# Patient Record
Sex: FEMALE | Race: WHITE | NOT HISPANIC OR LATINO | Employment: OTHER | URBAN - METROPOLITAN AREA
[De-identification: names, ages, dates, MRNs, and addresses within clinical notes are randomized per-mention and may not be internally consistent; named-entity substitution may affect disease eponyms.]

---

## 2017-02-01 ENCOUNTER — OFFICE VISIT (OUTPATIENT)
Dept: URGENT CARE | Age: 72
End: 2017-02-01
Payer: COMMERCIAL

## 2017-02-01 ENCOUNTER — HOSPITAL ENCOUNTER (OUTPATIENT)
Dept: RADIOLOGY | Age: 72
Discharge: HOME | End: 2017-02-01
Attending: PHYSICIAN ASSISTANT | Admitting: FAMILY MEDICINE
Payer: COMMERCIAL

## 2017-02-01 ENCOUNTER — TRANSCRIBE ORDERS (OUTPATIENT)
Dept: URGENT CARE | Age: 72
End: 2017-02-01

## 2017-02-01 DIAGNOSIS — S69.90XA UNSPECIFIED INJURY OF UNSPECIFIED WRIST, HAND AND FINGER(S), INITIAL ENCOUNTER: ICD-10-CM

## 2017-02-01 PROCEDURE — 99213 OFFICE O/P EST LOW 20 MIN: CPT | Performed by: FAMILY MEDICINE

## 2017-02-01 PROCEDURE — 73110 X-RAY EXAM OF WRIST: CPT

## 2017-02-01 PROCEDURE — 73130 X-RAY EXAM OF HAND: CPT

## 2017-04-17 ENCOUNTER — GENERIC CONVERSION - ENCOUNTER (OUTPATIENT)
Dept: OTHER | Facility: OTHER | Age: 72
End: 2017-04-17

## 2017-07-12 ENCOUNTER — ALLSCRIPTS OFFICE VISIT (OUTPATIENT)
Dept: OTHER | Facility: OTHER | Age: 72
End: 2017-07-12

## 2017-07-12 DIAGNOSIS — R63.4 ABNORMAL WEIGHT LOSS: ICD-10-CM

## 2017-07-12 DIAGNOSIS — R05.9 COUGH: ICD-10-CM

## 2017-07-12 DIAGNOSIS — R23.8 OTHER SKIN CHANGES: ICD-10-CM

## 2017-07-12 DIAGNOSIS — I10 ESSENTIAL (PRIMARY) HYPERTENSION: ICD-10-CM

## 2017-07-12 DIAGNOSIS — R00.2 PALPITATIONS: ICD-10-CM

## 2017-07-12 DIAGNOSIS — Z12.12 ENCOUNTER FOR SCREENING FOR MALIGNANT NEOPLASM OF RECTUM: ICD-10-CM

## 2017-07-12 DIAGNOSIS — Z87.891 PERSONAL HISTORY OF NICOTINE DEPENDENCE: ICD-10-CM

## 2017-07-12 DIAGNOSIS — R53.83 OTHER FATIGUE: ICD-10-CM

## 2017-07-12 DIAGNOSIS — Z12.11 ENCOUNTER FOR SCREENING FOR MALIGNANT NEOPLASM OF COLON: ICD-10-CM

## 2017-10-16 ENCOUNTER — APPOINTMENT (OUTPATIENT)
Dept: LAB | Facility: HOSPITAL | Age: 72
End: 2017-10-16
Payer: COMMERCIAL

## 2017-10-16 DIAGNOSIS — Z12.12 ENCOUNTER FOR SCREENING FOR MALIGNANT NEOPLASM OF RECTUM: ICD-10-CM

## 2017-10-16 DIAGNOSIS — Z12.11 ENCOUNTER FOR SCREENING FOR MALIGNANT NEOPLASM OF COLON: ICD-10-CM

## 2017-10-16 LAB — HEMOCCULT STL QL IA: NEGATIVE

## 2017-10-16 PROCEDURE — G0328 FECAL BLOOD SCRN IMMUNOASSAY: HCPCS

## 2017-10-19 ENCOUNTER — GENERIC CONVERSION - ENCOUNTER (OUTPATIENT)
Dept: OTHER | Facility: OTHER | Age: 72
End: 2017-10-19

## 2017-10-31 ENCOUNTER — TRANSCRIBE ORDERS (OUTPATIENT)
Dept: LAB | Facility: CLINIC | Age: 72
End: 2017-10-31

## 2017-10-31 ENCOUNTER — APPOINTMENT (OUTPATIENT)
Dept: LAB | Facility: CLINIC | Age: 72
End: 2017-10-31
Payer: COMMERCIAL

## 2017-10-31 DIAGNOSIS — Z12.12 ENCOUNTER FOR SCREENING FOR MALIGNANT NEOPLASM OF RECTUM: ICD-10-CM

## 2017-10-31 DIAGNOSIS — R23.8 OTHER SKIN CHANGES: ICD-10-CM

## 2017-10-31 DIAGNOSIS — R00.2 PALPITATIONS: ICD-10-CM

## 2017-10-31 DIAGNOSIS — R05.9 COUGH: ICD-10-CM

## 2017-10-31 DIAGNOSIS — Z12.11 ENCOUNTER FOR SCREENING FOR MALIGNANT NEOPLASM OF COLON: ICD-10-CM

## 2017-10-31 DIAGNOSIS — I10 ESSENTIAL (PRIMARY) HYPERTENSION: ICD-10-CM

## 2017-10-31 DIAGNOSIS — R63.4 ABNORMAL WEIGHT LOSS: ICD-10-CM

## 2017-10-31 DIAGNOSIS — R53.83 OTHER FATIGUE: ICD-10-CM

## 2017-10-31 LAB
25(OH)D3 SERPL-MCNC: 28.3 NG/ML (ref 30–100)
ALBUMIN SERPL BCP-MCNC: 3.5 G/DL (ref 3.5–5)
ALP SERPL-CCNC: 90 U/L (ref 46–116)
ALT SERPL W P-5'-P-CCNC: 22 U/L (ref 12–78)
ANION GAP SERPL CALCULATED.3IONS-SCNC: 7 MMOL/L (ref 4–13)
AST SERPL W P-5'-P-CCNC: 15 U/L (ref 5–45)
BASOPHILS # BLD AUTO: 0.05 THOUSANDS/ΜL (ref 0–0.1)
BASOPHILS NFR BLD AUTO: 1 % (ref 0–1)
BILIRUB SERPL-MCNC: 0.48 MG/DL (ref 0.2–1)
BILIRUB UR QL STRIP: NEGATIVE
BUN SERPL-MCNC: 25 MG/DL (ref 5–25)
CALCIUM SERPL-MCNC: 8.9 MG/DL (ref 8.3–10.1)
CHLORIDE SERPL-SCNC: 104 MMOL/L (ref 100–108)
CHOLEST SERPL-MCNC: 213 MG/DL (ref 50–200)
CLARITY UR: CLEAR
CO2 SERPL-SCNC: 26 MMOL/L (ref 21–32)
COLOR UR: YELLOW
CREAT SERPL-MCNC: 1.11 MG/DL (ref 0.6–1.3)
EOSINOPHIL # BLD AUTO: 0.18 THOUSAND/ΜL (ref 0–0.61)
EOSINOPHIL NFR BLD AUTO: 4 % (ref 0–6)
ERYTHROCYTE [DISTWIDTH] IN BLOOD BY AUTOMATED COUNT: 12.1 % (ref 11.6–15.1)
GFR SERPL CREATININE-BSD FRML MDRD: 50 ML/MIN/1.73SQ M
GLUCOSE P FAST SERPL-MCNC: 99 MG/DL (ref 65–99)
GLUCOSE UR STRIP-MCNC: NEGATIVE MG/DL
HCT VFR BLD AUTO: 36.3 % (ref 34.8–46.1)
HDLC SERPL-MCNC: 82 MG/DL (ref 40–60)
HGB BLD-MCNC: 12.4 G/DL (ref 11.5–15.4)
HGB UR QL STRIP.AUTO: NEGATIVE
INR PPP: 0.96 (ref 0.86–1.16)
KETONES UR STRIP-MCNC: NEGATIVE MG/DL
LDLC SERPL CALC-MCNC: 114 MG/DL (ref 0–100)
LEUKOCYTE ESTERASE UR QL STRIP: NEGATIVE
LYMPHOCYTES # BLD AUTO: 1.79 THOUSANDS/ΜL (ref 0.6–4.47)
LYMPHOCYTES NFR BLD AUTO: 35 % (ref 14–44)
MCH RBC QN AUTO: 32.4 PG (ref 26.8–34.3)
MCHC RBC AUTO-ENTMCNC: 34.2 G/DL (ref 31.4–37.4)
MCV RBC AUTO: 95 FL (ref 82–98)
MONOCYTES # BLD AUTO: 0.42 THOUSAND/ΜL (ref 0.17–1.22)
MONOCYTES NFR BLD AUTO: 8 % (ref 4–12)
NEUTROPHILS # BLD AUTO: 2.7 THOUSANDS/ΜL (ref 1.85–7.62)
NEUTS SEG NFR BLD AUTO: 52 % (ref 43–75)
NITRITE UR QL STRIP: NEGATIVE
NRBC BLD AUTO-RTO: 0 /100 WBCS
PH UR STRIP.AUTO: 6 [PH] (ref 4.5–8)
PLATELET # BLD AUTO: 326 THOUSANDS/UL (ref 149–390)
PMV BLD AUTO: 10.6 FL (ref 8.9–12.7)
POTASSIUM SERPL-SCNC: 4.4 MMOL/L (ref 3.5–5.3)
PROT SERPL-MCNC: 6.8 G/DL (ref 6.4–8.2)
PROT UR STRIP-MCNC: NEGATIVE MG/DL
PROTHROMBIN TIME: 12.8 SECONDS (ref 12.1–14.4)
RBC # BLD AUTO: 3.83 MILLION/UL (ref 3.81–5.12)
SODIUM SERPL-SCNC: 137 MMOL/L (ref 136–145)
SP GR UR STRIP.AUTO: 1.01 (ref 1–1.03)
TRIGL SERPL-MCNC: 85 MG/DL
TSH SERPL DL<=0.05 MIU/L-ACNC: 2.13 UIU/ML (ref 0.36–3.74)
UROBILINOGEN UR QL STRIP.AUTO: 0.2 E.U./DL
WBC # BLD AUTO: 5.16 THOUSAND/UL (ref 4.31–10.16)

## 2017-10-31 PROCEDURE — 81003 URINALYSIS AUTO W/O SCOPE: CPT

## 2017-10-31 PROCEDURE — 80053 COMPREHEN METABOLIC PANEL: CPT

## 2017-10-31 PROCEDURE — 36415 COLL VENOUS BLD VENIPUNCTURE: CPT

## 2017-10-31 PROCEDURE — 84443 ASSAY THYROID STIM HORMONE: CPT

## 2017-10-31 PROCEDURE — 82306 VITAMIN D 25 HYDROXY: CPT

## 2017-10-31 PROCEDURE — 85025 COMPLETE CBC W/AUTO DIFF WBC: CPT

## 2017-10-31 PROCEDURE — 85610 PROTHROMBIN TIME: CPT

## 2017-10-31 PROCEDURE — 80061 LIPID PANEL: CPT

## 2018-01-05 ENCOUNTER — OFFICE VISIT (OUTPATIENT)
Dept: URGENT CARE | Age: 73
End: 2018-01-05
Payer: COMMERCIAL

## 2018-01-05 PROCEDURE — 99213 OFFICE O/P EST LOW 20 MIN: CPT | Performed by: FAMILY MEDICINE

## 2018-01-07 NOTE — PROGRESS NOTES
Assessment   1  Acute sinusitis (461 9) (J01 90)    Plan   Acute sinusitis    · Amoxicillin-Pot Clavulanate 875-125 MG Oral Tablet (Augmentin); TAKE 1 TABLET    EVERY 12 HOURS DAILY    Discussion/Summary   Discussion Summary:    As we discussed this may possible be a sinus infection  We can try treatment  Start antibiotic  Take probitoic  You declined EKG today for dizziness you had  As we discussed, there may be another cause for this pain  You declined going to ER for full evaluation  If pain persist, becomes worse, you develop chest pain or develop any worsening symptomsI would recommend go directly to ER  Try warm compresses  Continue Advil and Flonase  Medication Side Effects Reviewed: Possible side effects of new medications were reviewed with the patient/guardian today  Understands and agrees with treatment plan: The treatment plan was reviewed with the patient/guardian  The patient/guardian understands and agrees with the treatment plan    Counseling Documentation With Imm: The patient was counseled regarding instructions for management,-- patient and family education,-- importance of compliance with treatment  Follow Up Instructions: Follow Up with your Primary Care Provider in days  If your symptoms worsen, go to the nearest [place] [address] Emergency Department  Chief Complaint   1  Ear Pain   2  Nausea  Chief Complaint Free Text Note Form: x 1 1/2 weeks- has worsening R ear and jaw pain that extends into teeth and neck  has sl  nasal rhinorrhea, sl  nausea and and was very dizzy yesterday (today improved)  Had chills today  Took 2 Advil at 5 pm  Flu vaccine  History of Present Illness   HPI: This is a 67year old female here today with right ear pain, jaw pain which radiates into the neck  She states she has had symptoms for about 1 5 weeks  Slight nasal congestion and she has post nasal drip  She states yesterday she nausea and dizziness  Still nauseous today but no dizziness   She states she has some pain over the frontal sinuses  She was using her Flonase but stopped this  She states she has been blowing her nose a lot  She has had issues with her sinuses in past but denies ear pain or jaw pain with symptoms  She denies any chest pain, SOB or difficulty breathing  She has been using Advil which help  She states she had chills yesterday  Hospital Based Practices Required Assessment:      Pain Assessment      the patient states they have pain  The pain is located in the R ear and jaw  (on a scale of 0 to 10, the patient rates the pain at 10 )      Abuse And Domestic Violence Screen       Yes, the patient is safe at home  -- The patient states no one is hurting them  Depression And Suicide Screen  No, the patient has not had thoughts of hurting themself  No, the patient has not felt depressed in the past 7 days  Prefered Language is  [place]  Primary Language is  English  Review of Systems   Focused-Female:      Constitutional: feeling poorly, but-- as noted in HPI-- and-- no fever  ENT: earache, but-- as noted in HPI  Cardiovascular: no complaints of slow or fast heart rate, no chest pain, no palpitations, no leg claudication or lower extremity edema  Respiratory: no complaints of shortness of breath, no wheezing, no dyspnea on exertion, no orthopnea or PND  Gastrointestinal: nausea  Neurological: no complaints of headache, no confusion, no numbness or tingling, no dizziness or fainting  ROS Reviewed:    ROS reviewed  Active Problems   1  Abnormal EKG (794 31) (R94 31)   2  Allergic rhinitis (477 9) (J30 9)   3  Allergic to IV contrast (V15 08) (Z91 041)   4  Arthralgia (719 40) (M25 50)   5  Benign essential hypertension (401 1) (I10)   6  Cataract (366 9) (H26 9)   7  Cough (786 2) (R05)   8  Easy bruising (782 9) (R23 8)   9  Encounter for colorectal cancer screening (V76 51) (Z12 11,Z12 12)   10   Encounter for special screening examination for genitourinary disorder (V81 6) (Z13 89)   11  Fatigue (780 79) (R53 83)   12  Headache (784 0) (R51)   13  Intermittent palpitations (785 1) (R00 2)   14  Left buttock pain (729 1) (M79 1)   15  Lung density on x-ray (518 89) (J98 4)   16  Need for influenza vaccination (V04 81) (Z23)   17  Need for vaccination with 13-polyvalent pneumococcal conjugate vaccine (V03 82) (Z23)   18  Osteopenia (733 90) (M85 80)   19  Unintentional weight loss (783 21) (R63 4)   20  Visit for screening mammogram (V76 12) (Z12 31)   21  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)    Past Medical History   1  History of Concussion, without loss of consciousness, initial encounter   2  History of Concussion, without loss of consciousness, subsequent encounter   3  History of acute sinusitis (V12 69) (Z87 09)   4  History of head injury (V15 59) (Z87 828)   5  History of Injury of nose, initial encounter (959 09) (S09 92XA)   6  History of Injury, hand (959 4) (S69 90XA)   7  History of Sprain of left wrist (842 00) (S63 502A)   8  History of Wrist injury (959 3) (S69 90XA)   9  History of Wrist injury (959 3) (E82 90HW)  Active Problems And Past Medical History Reviewed: The active problems and past medical history were reviewed and updated today  Family History   Mother    1  Family history of    2  Family history of hypertension (V17 49) (Z82 49)   3  Family history of malignant neoplasm (V16 9) (Z80 9)  Father    4  Family history of   Sibling    5  Family history of   Sister    10  Family history of   Brother    9  Family history of   Family History Reviewed: The family history was reviewed and updated today         Social History    · Alcohol ingestion, 1-4 drinks per day (V69 8) (Z78 9)   · Always uses seat belt   · Caffeine use (V49 89) (F15 90)   · Exercises regularly   · Former smoker (D08 20) (Z87 891)   · Housewife or homemaker   ·    · No drug use   · Three children  Social History Reviewed: The social history was reviewed and updated today  The social history was reviewed and is unchanged  Surgical History   1  History of Appendectomy   2  History of Cataract Surgery   3  History of Laparoscopic Appendectomy Incidental   4  History of Tonsillectomy   5  History of Tubal Ligation  Surgical History Reviewed: The surgical history was reviewed and updated today  Current Meds    1  Advil CAPS; Therapy: (Recorded:07Aip1160) to Recorded   2  Benadryl Allergy 25 MG Oral Tablet; take 2 tablets po one hour after iv contrast injected; Therapy: 90QKB7913 to (Last YK:[id number])  Requested for: 30MCX4529 Ordered   3  Centrum Silver Ultra Womens Oral Tablet; Therapy: (Recorded:05Jan2018) to Recorded   4  Flonase 50 MCG/ACT SUSP; Therapy: (Recorded:10Ibg3426) to Recorded   5  Lisinopril 5 MG Oral Tablet; Take 1 tablet daily; Therapy: 89UUV8429 to (Evaluate:04Apr2018)  Requested for: 98OKI1560; Last     FE:31VBO5244 Ordered   6  PredniSONE 50 MG Oral Tablet; take one tablet PO 13 hours before procedure/injection,     and one tablet 7 hours before procedure/injection, and one tablet 1 hour before proc; Therapy: 49TSN0184 to (Last KN:57LGI5994)  Requested for: 22WKA1710 Ordered   7  Viactiv 426-299-54 MG-UNT-MCG Oral Tablet Chewable; Therapy: (Recorded:18Nov2016) to Recorded   8  Zyrtec TABS; Therapy: (Recorded:48Cvz0529) to Recorded  Medication List Reviewed: The medication list was reviewed and updated today  Allergies   1  No Known Drug Allergies  2  IVP Dye   3  Animal dander - Cats   4  Dust Mite   5   Pollen    Vitals   Signs   Recorded: 98VJS1188 07:17PM   Temperature: 98 4 F, Oral  Heart Rate: 54  Pulse Quality: Regular  Respiration: 18  Systolic: 554, RUE, Sitting  Diastolic: 70, RUE, Sitting  Height: 5 ft 4 in  Weight: 119 lb 3 2 oz  BMI Calculated: 20 46  BSA Calculated: 1 57  O2 Saturation: 98  LMP: n/a  Pain Scale: 10    Physical Exam        Constitutional      General appearance: No acute distress, well appearing and well nourished  Ears, Nose, Mouth, and Throat      Otoscopic examination: Abnormal  -- serous fluid, bulging TM  Nasal mucosa, septum, and turbinates: Normal without edema or erythema  Oropharynx: Abnormal  -- no erythema or swelling of the gums  Pulmonary      Respiratory effort: No increased work of breathing or signs of respiratory distress  Auscultation of lungs: Clear to auscultation  Cardiovascular      Palpation of heart: Normal PMI, no thrills  Auscultation of heart: Normal rate and rhythm, normal S1 and S2, without murmurs  Psychiatric      Orientation to person, place, and time: Normal        Mood and affect: Normal        Additional Exam:  TTP over the frontal sinus, TTP over the right upper and lower jaw, TTP along the right lateral neck        Results/Data   ECG: declined      Signatures    Electronically signed by : Kristofer Scott; Jan 5 2018  9:33PM EST                       (Author)     Electronically signed by : Dilma Hernandez DO; Jan 6 2018  7:51AM EST                       (Co-author)

## 2018-01-13 VITALS
WEIGHT: 116.38 LBS | SYSTOLIC BLOOD PRESSURE: 130 MMHG | DIASTOLIC BLOOD PRESSURE: 68 MMHG | HEART RATE: 50 BPM | HEIGHT: 64 IN | TEMPERATURE: 98.4 F | OXYGEN SATURATION: 98 % | BODY MASS INDEX: 19.87 KG/M2

## 2018-01-23 VITALS
RESPIRATION RATE: 18 BRPM | SYSTOLIC BLOOD PRESSURE: 122 MMHG | OXYGEN SATURATION: 98 % | HEIGHT: 64 IN | TEMPERATURE: 98.4 F | DIASTOLIC BLOOD PRESSURE: 70 MMHG | BODY MASS INDEX: 20.35 KG/M2 | WEIGHT: 119.2 LBS | HEART RATE: 54 BPM

## 2018-04-02 DIAGNOSIS — I10 ESSENTIAL HYPERTENSION: Primary | ICD-10-CM

## 2018-04-02 RX ORDER — LISINOPRIL 5 MG/1
TABLET ORAL
Qty: 90 TABLET | Refills: 0 | Status: SHIPPED | OUTPATIENT
Start: 2018-04-02 | End: 2018-08-31 | Stop reason: SDUPTHER

## 2018-08-31 DIAGNOSIS — I10 ESSENTIAL HYPERTENSION: ICD-10-CM

## 2018-09-01 RX ORDER — LISINOPRIL 5 MG/1
TABLET ORAL
Qty: 30 TABLET | Refills: 0 | Status: SHIPPED | OUTPATIENT
Start: 2018-09-01 | End: 2018-10-01 | Stop reason: SDUPTHER

## 2018-09-04 NOTE — TELEPHONE ENCOUNTER
LMOM on home number for patient to call to schedule appointment  Cell # is not taking calls at this time

## 2018-09-06 NOTE — TELEPHONE ENCOUNTER
Spoke to patient  She refused to schedule an appointment  She stated she will call back to schedule  She is aware an appointment is needed for future medication refills

## 2018-10-01 DIAGNOSIS — I10 ESSENTIAL HYPERTENSION: ICD-10-CM

## 2018-10-01 RX ORDER — LISINOPRIL 5 MG/1
TABLET ORAL
Qty: 14 TABLET | Refills: 0 | Status: SHIPPED | OUTPATIENT
Start: 2018-10-01 | End: 2018-10-17

## 2018-10-01 NOTE — TELEPHONE ENCOUNTER
Patient refused to schedule appt per telephone note last month  She has not been here since 07/2017- needs to schedule appt   I authorized 2 week refill only

## 2018-10-11 RX ORDER — DIPHENHYDRAMINE HCL 25 MG
CAPSULE ORAL
COMMUNITY
Start: 2017-11-09 | End: 2021-09-14

## 2018-10-11 RX ORDER — OMEGA-3 FATTY ACIDS/FISH OIL 300-1000MG
CAPSULE ORAL
COMMUNITY
End: 2019-02-26 | Stop reason: HOSPADM

## 2018-10-11 RX ORDER — FLUTICASONE PROPIONATE 50 MCG
SPRAY, SUSPENSION (ML) NASAL
COMMUNITY

## 2018-10-11 RX ORDER — CETIRIZINE HYDROCHLORIDE 10 MG/1
TABLET ORAL
COMMUNITY

## 2018-10-17 ENCOUNTER — OFFICE VISIT (OUTPATIENT)
Dept: FAMILY MEDICINE CLINIC | Facility: CLINIC | Age: 73
End: 2018-10-17
Payer: COMMERCIAL

## 2018-10-17 VITALS
OXYGEN SATURATION: 98 % | RESPIRATION RATE: 16 BRPM | WEIGHT: 117 LBS | HEIGHT: 63 IN | SYSTOLIC BLOOD PRESSURE: 142 MMHG | TEMPERATURE: 98 F | BODY MASS INDEX: 20.73 KG/M2 | HEART RATE: 56 BPM | DIASTOLIC BLOOD PRESSURE: 70 MMHG

## 2018-10-17 DIAGNOSIS — R05.8 DRY COUGH: Primary | ICD-10-CM

## 2018-10-17 DIAGNOSIS — Z12.11 SCREEN FOR COLON CANCER: ICD-10-CM

## 2018-10-17 DIAGNOSIS — Z12.39 SCREENING FOR MALIGNANT NEOPLASM OF BREAST: ICD-10-CM

## 2018-10-17 DIAGNOSIS — Z13.5 SCREENING FOR GLAUCOMA: ICD-10-CM

## 2018-10-17 DIAGNOSIS — E55.9 VITAMIN D INSUFFICIENCY: ICD-10-CM

## 2018-10-17 DIAGNOSIS — Z11.59 NEED FOR HEPATITIS C SCREENING TEST: ICD-10-CM

## 2018-10-17 DIAGNOSIS — M65.332 TRIGGER MIDDLE FINGER OF LEFT HAND: ICD-10-CM

## 2018-10-17 DIAGNOSIS — R53.83 OTHER FATIGUE: ICD-10-CM

## 2018-10-17 DIAGNOSIS — R00.2 INTERMITTENT PALPITATIONS: ICD-10-CM

## 2018-10-17 DIAGNOSIS — I10 ESSENTIAL HYPERTENSION: ICD-10-CM

## 2018-10-17 DIAGNOSIS — E78.9 BORDERLINE HIGH CHOLESTEROL: ICD-10-CM

## 2018-10-17 DIAGNOSIS — Z00.00 MEDICARE ANNUAL WELLNESS VISIT, SUBSEQUENT: ICD-10-CM

## 2018-10-17 DIAGNOSIS — Z78.0 POSTMENOPAUSAL: ICD-10-CM

## 2018-10-17 DIAGNOSIS — Z23 ENCOUNTER FOR IMMUNIZATION: ICD-10-CM

## 2018-10-17 PROBLEM — J98.4 LUNG DENSITY ON X-RAY: Status: ACTIVE | Noted: 2017-10-24

## 2018-10-17 PROBLEM — M85.80 OSTEOPENIA: Status: ACTIVE | Noted: 2017-10-23

## 2018-10-17 PROCEDURE — 1160F RVW MEDS BY RX/DR IN RCRD: CPT

## 2018-10-17 PROCEDURE — G0439 PPPS, SUBSEQ VISIT: HCPCS | Performed by: PHYSICIAN ASSISTANT

## 2018-10-17 PROCEDURE — 90471 IMMUNIZATION ADMIN: CPT

## 2018-10-17 PROCEDURE — 3008F BODY MASS INDEX DOCD: CPT | Performed by: PHYSICIAN ASSISTANT

## 2018-10-17 PROCEDURE — 99214 OFFICE O/P EST MOD 30 MIN: CPT | Performed by: PHYSICIAN ASSISTANT

## 2018-10-17 PROCEDURE — 1160F RVW MEDS BY RX/DR IN RCRD: CPT | Performed by: PHYSICIAN ASSISTANT

## 2018-10-17 PROCEDURE — 90662 IIV NO PRSV INCREASED AG IM: CPT

## 2018-10-17 RX ORDER — AMLODIPINE BESYLATE 5 MG/1
5 TABLET ORAL DAILY
Qty: 30 TABLET | Refills: 2 | Status: SHIPPED | OUTPATIENT
Start: 2018-10-17 | End: 2018-11-23 | Stop reason: SDUPTHER

## 2018-10-17 RX ORDER — LISINOPRIL 5 MG/1
5 TABLET ORAL DAILY
Qty: 30 TABLET | Refills: 0 | Status: CANCELLED | OUTPATIENT
Start: 2018-10-17

## 2018-10-17 NOTE — PROGRESS NOTES
Routine Follow-up    Jael Aleman 67 y o  female   Date:  10/17/2018      Assessment and Plan:    Allison Varela was seen today for medicare wellness visit and follow-up  Diagnoses and all orders for this visit:    Dry cough  -     Vitamin D 25 hydroxy; Future  - ? Allergic cough   - trial stopping lisinopril and will start a different BP medication    Essential hypertension  -     amLODIPine (NORVASC) 5 mg tablet; Take 1 tablet (5 mg total) by mouth daily  -     Comprehensive metabolic panel; Future  -     CBC and differential; Future  -     TSH, 3rd generation with Free T4 reflex; Future  -     ECG 12 lead; Future    Other fatigue  -     CBC and differential; Future  -     Iron; Future  -     TSH, 3rd generation with Free T4 reflex; Future    Borderline high cholesterol  -     Lipid panel; Future    Vitamin D insufficiency  -     Vitamin D 25 hydroxy; Future    Trigger middle finger of left hand  -     Cancel: Ambulatory referral to Orthopedic Surgery; Future  -     Ambulatory referral to Orthopedic Surgery; Future    Screen for colon cancer  -     Occult Blood, Fecal Immunochemical    Need for hepatitis C screening test  -     Hepatitis C antibody    Screening for malignant neoplasm of breast  -     Mammo screening bilateral w cad; Future    Encounter for immunization  -     Flu Vaccine High Dose Split Preservative Free IM    Intermittent palpitations  -     ECG 12 lead; Future              HPI:  Chief Complaint   Patient presents with    Medicare Wellness Visit    Follow-up     HPI   Patient is a 66 yo female who presents for routine check up and AWV  She was last seen > 1 year ago by another provider  She has been on lisinopril for HTN but does report to a dry cough  She does have allergies for which she sees an allergy for and does get allergy shots  She does have cats but is allergic  She is due for mammogram and CRC screen  She refuses pap smear  She is due for routine labs   She reports that her middle finger of L hand gets locked at PIP and then gets caught on things  She does report long history of palpitations for which she had EKG in the past      ROS: Review of Systems   Constitutional: Positive for fatigue  Negative for activity change, appetite change and fever  HENT: Negative  Eyes: Negative  Respiratory: Positive for cough  Negative for chest tightness, shortness of breath and wheezing  Cardiovascular: Positive for palpitations  Negative for chest pain and leg swelling  Gastrointestinal: Negative  Genitourinary: Negative  Musculoskeletal: Negative  Skin: Negative  Neurological: Negative  Hematological: Negative  No past medical history on file    Patient Active Problem List   Diagnosis    Abnormal EKG    Allergic rhinitis    Benign essential hypertension    Cataract    Intermittent palpitations    Lung density on x-ray    Osteopenia    Visual field scotoma of both eyes       Past Surgical History:   Procedure Laterality Date    APPENDECTOMY      CATARACT EXTRACTION      LAPAROSCOPIC APPENDECTOMY      incidental     TONSILLECTOMY      TUBAL LIGATION         Social History     Social History    Marital status: /Civil Union     Spouse name: N/A    Number of children: 3    Years of education: N/A     Social History Main Topics    Smoking status: Former Smoker     Packs/day: 1 00    Smokeless tobacco: Not on file      Comment: age 13-35     Alcohol use 0 6 - 2 4 oz/week     1 - 4 Standard drinks or equivalent per week      Comment: 1-4 drinks per day     Drug use: No    Sexual activity: Not on file     Other Topics Concern    Not on file     Social History Narrative    Always uses seat belt     Caffeine use 2 cans/cups per day     Exercises regularly     Housewife or homemaker        Family History   Problem Relation Age of Onset    Lung cancer Mother     Hypertension Mother     Cancer Mother     Cerebral aneurysm Father         bleed    Alcohol abuse Father     Cirrhosis Father     Other Father         had fallen   [de-identified] Other Sister         unkownn cause     Pneumonia Brother         infancy of pneumonia    Other Sister         in MVA       Allergies   Allergen Reactions    Cat Hair Extract     Dust Mite Extract     Iv Dye  [Iodinated Diagnostic Agents] Hives     Category: Allergy;     Pollen Extract      Seasonal allergies         Current Outpatient Prescriptions:     Calcium-Vitamin D-Vitamin K (VIACTIV) 183-877-32 MG-UNT-MCG CHEW, Chew, Disp: , Rfl:     cetirizine (ZYRTEC ALLERGY) 10 mg tablet, Take by mouth, Disp: , Rfl:     diphenhydrAMINE (BENADRYL) 25 mg capsule, Take by mouth, Disp: , Rfl:     fluticasone (FLONASE) 50 mcg/act nasal spray, into each nostril, Disp: , Rfl:     Ibuprofen (ADVIL) 200 MG CAPS, Take by mouth, Disp: , Rfl:     Multiple Vitamins-Minerals (CENTRUM SILVER 50+WOMEN PO), Take by mouth, Disp: , Rfl:     amLODIPine (NORVASC) 5 mg tablet, Take 1 tablet (5 mg total) by mouth daily, Disp: 30 tablet, Rfl: 2      Physical Exam:  /70   Pulse 56   Temp 98 °F (36 7 °C)   Resp 16   Ht 5' 2 6" (1 59 m)   Wt 53 1 kg (117 lb)   SpO2 98%   BMI 20 99 kg/m²     Physical Exam   Constitutional: She is oriented to person, place, and time  Vital signs are normal  She appears well-developed and well-nourished  No distress  HENT:   Head: Normocephalic and atraumatic  Right Ear: Tympanic membrane, external ear and ear canal normal    Left Ear: Tympanic membrane, external ear and ear canal normal    Nose: Nose normal    Mouth/Throat: Oropharynx is clear and moist    Eyes: Pupils are equal, round, and reactive to light  Conjunctivae and lids are normal    Neck: Trachea normal and normal range of motion  Neck supple  No thyromegaly present  Cardiovascular: Normal rate, regular rhythm, S1 normal, S2 normal and intact distal pulses  Exam reveals no gallop  No murmur heard    Pulmonary/Chest: Breath sounds normal  No respiratory distress  She has no wheezes  She has no rhonchi  She has no rales  Abdominal: Soft  Normal appearance and bowel sounds are normal  She exhibits no mass  There is no hepatosplenomegaly  There is no tenderness  Musculoskeletal: Normal range of motion  She exhibits no edema or deformity  Lymphadenopathy:     She has no cervical adenopathy  Neurological: She is alert and oriented to person, place, and time  She has normal reflexes  No cranial nerve deficit or sensory deficit  Skin: Skin is warm and dry  No rash noted  No cyanosis  No pallor  Nails show no clubbing  Psychiatric: She has a normal mood and affect   Her behavior is normal  Cognition and memory are normal          Labs:  Lab Results   Component Value Date    WBC 5 16 10/31/2017    HGB 12 4 10/31/2017    HCT 36 3 10/31/2017    MCV 95 10/31/2017     10/31/2017     Lab Results   Component Value Date     10/31/2017    K 4 4 10/31/2017     10/31/2017    CO2 26 10/31/2017    BUN 25 10/31/2017    CREATININE 1 11 10/31/2017    GLUF 99 10/31/2017    CALCIUM 8 9 10/31/2017    AST 15 10/31/2017    ALT 22 10/31/2017    ALKPHOS 90 10/31/2017    EGFR 50 10/31/2017

## 2018-10-17 NOTE — PROGRESS NOTES
Assessment and Plan:    Problem List Items Addressed This Visit     Intermittent palpitations    Relevant Orders    ECG 12 lead      Other Visit Diagnoses     Dry cough    -  Primary    Relevant Orders    Vitamin D 25 hydroxy    Essential hypertension        Relevant Medications    amLODIPine (NORVASC) 5 mg tablet    Other Relevant Orders    Comprehensive metabolic panel    CBC and differential    TSH, 3rd generation with Free T4 reflex    ECG 12 lead    Other fatigue        Relevant Orders    CBC and differential    Iron    TSH, 3rd generation with Free T4 reflex    Borderline high cholesterol        Relevant Orders    Lipid panel    Vitamin D insufficiency        Relevant Orders    Vitamin D 25 hydroxy    Trigger middle finger of left hand        Relevant Orders    Ambulatory referral to Orthopedic Surgery    Medicare annual wellness visit, subsequent        Screen for colon cancer        Relevant Orders    Occult Blood, Fecal Immunochemical    Screening for glaucoma        Need for hepatitis C screening test        Relevant Orders    Hepatitis C antibody    Screening for malignant neoplasm of breast        Relevant Orders    Mammo screening bilateral w cad    Postmenopausal        Encounter for immunization        Relevant Orders    Flu Vaccine High Dose Split Preservative Free IM (Completed)        Health Maintenance Due   Topic Date Due    CRC Screening: Colonoscopy  1945    DTaP,Tdap,and Td Vaccines (1 - Tdap) 11/02/1965    Pneumococcal PPSV23/PCV13 65+ Years / Low and Medium Risk (2 of 2 - PPSV23) 07/12/2018         HPI:  Len Carpenter is a 67 y o  female here for her Subsequent Wellness Visit  Patient Active Problem List   Diagnosis    Abnormal EKG    Allergic rhinitis    Benign essential hypertension    Cataract    Intermittent palpitations    Lung density on x-ray    Osteopenia    Visual field scotoma of both eyes     No past medical history on file    Past Surgical History: Procedure Laterality Date    APPENDECTOMY      CATARACT EXTRACTION      LAPAROSCOPIC APPENDECTOMY      incidental     TONSILLECTOMY      TUBAL LIGATION       Family History   Problem Relation Age of Onset    Lung cancer Mother     Hypertension Mother     Cancer Mother     Cerebral aneurysm Father         bleed    Alcohol abuse Father     Cirrhosis Father     Other Father         had fallen   Tomie Coop Other Sister         unkownn cause     Pneumonia Brother         infancy of pneumonia    Other Sister         in MVA     History   Smoking Status    Former Smoker    Packs/day: 1 00   Smokeless Tobacco    Not on file     Comment: age 14-35      History   Alcohol Use    0 6 - 2 4 oz/week    1 - 4 Standard drinks or equivalent per week     Comment: 1-4 drinks per day       History   Drug Use No       Current Outpatient Prescriptions   Medication Sig Dispense Refill    Calcium-Vitamin D-Vitamin K (VIACTIV) 500-500-40 MG-UNT-MCG CHEW Chew      cetirizine (ZYRTEC ALLERGY) 10 mg tablet Take by mouth      diphenhydrAMINE (BENADRYL) 25 mg capsule Take by mouth      fluticasone (FLONASE) 50 mcg/act nasal spray into each nostril      Ibuprofen (ADVIL) 200 MG CAPS Take by mouth      Multiple Vitamins-Minerals (CENTRUM SILVER 50+WOMEN PO) Take by mouth      amLODIPine (NORVASC) 5 mg tablet Take 1 tablet (5 mg total) by mouth daily 30 tablet 2     No current facility-administered medications for this visit  Allergies   Allergen Reactions    Cat Hair Extract     Dust Mite Extract     Iv Dye  [Iodinated Diagnostic Agents] Hives     Category:  Allergy;     Pollen Extract      Seasonal allergies     Immunization History   Administered Date(s) Administered    Influenza, high dose seasonal 0 5 mL 10/17/2018    Pneumococcal Conjugate 13-Valent 07/12/2017       Patient Care Team:  Chilo Herzog DO as PCP - General  Melony Fink MD    Medicare Screening Tests and Risk Assessments:  Ander Malik is here for her Subsequent Wellness visit  Last Medicare Wellness visit information reviewed, patient interviewed, no change since last AWV  Health Risk Assessment:  Patient rates overall health as good  Patient feels that their physical health rating is Same  Eyesight was rated as Same  Hearing was rated as Same  Patient feels that their emotional and mental health rating is Same  Pain experienced by patient in the last 7 days has been Some  Patient's pain rating has been 6/10  Emotional/Mental Health:  Patient has been feeling nervous/anxious  PHQ-9 Depression Screening:    Frequency of the following problems over the past two weeks:      1  Little interest or pleasure in doing things: 0 - not at all      2  Feeling down, depressed, or hopeless: 0 - not at all  PHQ-2 Score: 0          Broken Bones/Falls: Fall Risk Assessment:    In the past year, patient has experienced: No history of falling in past year          Bladder/Bowel:  Patient has not leaked urine accidently in the last six months  Patient reports no loss of bowel control  Immunizations:  Patient has not had a flu vaccination within the last year  Patient has not received a pneumonia shot  Patient has not received a shingles shot  Patient has not received tetanus/diphtheria shot  Home Safety:  Patient does not have trouble with stairs inside or outside of their home  Patient currently reports that there are no safety hazards present in home, working smoke alarms, working carbon monoxide detectors  Preventative Screenings:   Breast cancer screening performed, colon cancer screen completed, cholesterol screen completed, glaucoma eye exam completed,     Nutrition:  Current diet: Regular with servings of the following:    Medications:  Patient is currently taking over-the-counter supplements  List of OTC medications includes: see med list  Patient is able to manage medications  Lifestyle Choices:  Patient reports no tobacco use  Patient has smoked or used tobacco in the past   Patient has stopped her tobacco use  Patient reports alcohol use  Alcohol use per week: 10 glasses of wine  Patient drives a vehicle  Patient wears seat belt  Current level of exercise of physical activity described by patient as: normal         Activities of Daily Living:  Can get out of bed by his or her self, able to dress self, able to make own meals, able to do own shopping, able to bathe self, can do own laundry/housekeeping, can manage own money, pay bills and track expenses    Previous Hospitalizations:  No hospitalization or ED visit in past 12 months        Advanced Directives:  Patient has decided on a power of   Patient has spoken to designated power of   Patient has not completed advanced directive  Preventative Screening/Counseling:      Cardiovascular:      General: Screening Current      Counseling: Improve Blood Pressure     Due for Labs/Analytes/Optional EKG: Lipid Panel          Diabetes:      General: Screening Current          Colorectal Cancer:      General: Screening Current and Risks and Benefits Discussed      Due for studies: Fecal Occult Blood          Cervical Cancer:      Due for studies: Cervical Pap Smear          Osteoporosis:      General: Screening Current          AAA:      General: Screening Not Indicated          Glaucoma:      Referrals: Optometry      Comments: Overdue x 2 years         HIV:      General: Screening Not Indicated          Hepatitis C:      Counseling: has received general HCV counseling        Advanced Directives: Information on ACP and/or AD provided   Additional Comments: Discussed paperwork

## 2018-10-17 NOTE — PATIENT INSTRUCTIONS
Please stop lisinopril due to cough and start amlodipine for blood pressure alternative  Please call if cough degroot snot go away even after stopping liisinopril  Urinary Incontinence   WHAT YOU NEED TO KNOW:   What is urinary incontinence? Urinary incontinence (UI) is when you lose control of your bladder  What causes UI? UI occurs because your bladder cannot store or empty urine properly  The following are the most common types of UI:  · Stress incontinence  is when you leak urine due to increased bladder pressure  This may happen when you cough, sneeze, or exercise  · Urge incontinence  is when you feel the need to urinate right away and leak urine accidentally  · Mixed incontinence  is when you have both stress and urge UI  What are the signs and symptoms of UI?   · You feel like your bladder does not empty completely when you urinate  · You urinate often and need to urinate immediately  · You leak urine when you sleep, or you wake up with the urge to urinate  · You leak urine when you cough, sneeze, exercise, or laugh  How is UI diagnosed? Your healthcare provider will ask how often you leak urine and whether you have stress or urge symptoms  Tell him which medicines you take, how often you urinate, and how much liquid you drink each day  You may need any of the following tests:  · Urine tests  may show infection or kidney function  · A pelvic exam  may be done to check for blockages  A pelvic exam will also show if your bladder, uterus, or other organs have moved out of place  · An x-ray, ultrasound, or CT  may show problems with parts of your urinary system  You may be given contrast liquid to help your organs show up better in the pictures  Tell the healthcare provider if you have ever had an allergic reaction to contrast liquid  Do not enter the MRI room with anything metal  Metal can cause serious injury   Tell the healthcare provider if you have any metal in or on your body     · A bladder scan  will show how much urine is left in your bladder after you urinate  You will be asked to urinate and then healthcare providers will use a small ultrasound machine to check the urine left in your bladder  · Cystometry  is used to check the function of your urinary system  Your healthcare provider checks the pressure in your bladder while filling it with fluid  Your bladder pressure may also be tested when your bladder is full and while you urinate  How is UI treated? · Medicines  can help strengthen your bladder control  · Electrical stimulation  is used to send a small amount of electrical energy to your pelvic floor muscles  This helps control your bladder function  Electrodes may be placed outside your body or in your rectum  For women, the electrodes may be placed in the vagina  · A bulking agent  may be injected into the wall of your urethra to make it thicker  This helps keep your urethra closed and decreases urine leakage  · Devices  such as a clamp, pessary, or tampon may help stop urine leaks  Ask your healthcare provider for more information about these and other devices  · Surgery  may be needed if other treatments do not work  Several types of surgery can help improve your bladder control  Ask your healthcare provider for more information about the surgery you may need  How can I manage my symptoms? · Do pelvic muscle exercises often  Your pelvic muscles help you stop urinating  Squeeze these muscles tight for 5 seconds, then relax for 5 seconds  Gradually work up to squeezing for 10 seconds  Do 3 sets of 15 repetitions a day, or as directed  This will help strengthen your pelvic muscles and improve bladder control  · A catheter  may be used to help empty your bladder  A catheter is a tiny, plastic tube that is put into your bladder to drain your urine   Your healthcare provider may tell you to use a catheter to prevent your bladder from getting too full and leaking urine  · Keep a UI record  Write down how often you leak urine and how much you leak  Make a note of what you were doing when you leaked urine  · Train your bladder  Go to the bathroom at set times, such as every 2 hours, even if you do not feel the urge to go  You can also try to hold your urine when you feel the urge to go  For example, hold your urine for 5 minutes when you feel the urge to go  As that becomes easier, hold your urine for 10 minutes  · Drink liquids as directed  Ask your healthcare provider how much liquid to drink each day and which liquids are best for you  You may need to limit the amount of liquid you drink to help control your urine leakage  Limit or do not have drinks that contain caffeine or alcohol  Do not drink any liquid right before you go to bed  · Prevent constipation  Eat a variety of high-fiber foods  Good examples are high-fiber cereals, beans, vegetables, and whole-grain breads  Prune juice may help make your bowel movement softer  Walking is the best way to trigger your intestines to have a bowel movement  · Exercise regularly and maintain a healthy weight  Ask your healthcare provider how much you should weigh and about the best exercise plan for you  Weight loss and exercise will decrease pressure on your bladder and help you control your leakage  Ask him to help you create a weight loss plan if you are overweight  When should I seek immediate care? · You have severe pain  · You are confused or cannot think clearly  When should I contact my healthcare provider? · You have a fever  · You see blood in your urine  · You have pain when you urinate  · You have new or worse pain, even after treatment  · Your mouth feels dry or you have vision changes  · Your urine is cloudy or smells bad  · You have questions or concerns about your condition or care  CARE AGREEMENT:   You have the right to help plan your care   Learn about your health condition and how it may be treated  Discuss treatment options with your caregivers to decide what care you want to receive  You always have the right to refuse treatment  The above information is an  only  It is not intended as medical advice for individual conditions or treatments  Talk to your doctor, nurse or pharmacist before following any medical regimen to see if it is safe and effective for you  © 2017 [facility] Information is for End User's use only and may not be sold, redistributed or otherwise used for commercial purposes  All illustrations and images included in CareNotes® are the copyrighted property of A [employer]  or Mono Thompson  Fall Prevention   AMBULATORY CARE:   Fall prevention  includes ways to make your home and other areas safer  It also includes ways you can move more carefully to prevent a fall  Health conditions that cause changes in your blood pressure, vision, or muscle strength and coordination may increase your risk for falls  Medicines may also increase your risk for falls if they make you dizzy, weak, or sleepy  Call 911 or have someone else call if:   · You have fallen and are unconscious  · You have fallen and cannot move part of your body  Contact your healthcare provider if:   · You have fallen and have pain or a headache  · You have questions or concerns about your condition or care  Fall prevention tips:   · Stand or sit up slowly  This may help you keep your balance and prevent falls  · Use assistive devices as directed  Your healthcare provider may suggest that you use a cane or walker to help you keep your balance  You may need to have grab bars put in your bathroom near the toilet or in the shower  · Wear shoes that fit well and have soles that   Wear shoes both inside and outside  Use slippers with good   Do not wear shoes with high heels  · Wear a personal alarm    This is a device that allows you to call 911 if you fall and need help  Ask your healthcare provider for more information  · Stay active  Exercise can help strengthen your muscles and improve your balance  Your healthcare provider may recommend water aerobics or walking  He or she may also recommend physical therapy to improve your coordination  Never start an exercise program without talking to your healthcare provider first      · Manage your medical conditions  Keep all appointments with your healthcare providers  Visit your eye doctor as directed  Home safety tips:   · Add items to prevent falls in the bathroom  Put nonslip strips on your bath or shower floor to prevent you from slipping  Use a bath mat if you do not have carpet in the bathroom  This will prevent you from falling when you step out of the bath or shower  Use a shower seat so you do not need to stand while you shower  Sit on the toilet or a chair in your bathroom to dry yourself and put on clothing  This will prevent you from losing your balance from drying or dressing yourself while you are standing  · Keep paths clear  Remove books, shoes, and other objects from walkways and stairs  Place cords for telephones and lamps out of the way so that you do not need to walk over them  Tape them down if you cannot move them  Remove small rugs  If you cannot remove a rug, secure it with double-sided tape  This will prevent you from tripping  · Install bright lights in your home  Use night lights to help light paths to the bathroom or kitchen  Always turn on the light before you start walking  · Keep items you use often on shelves within reach  Do not use a step stool to help you reach an item  · Paint or place reflective tape on the edges of your stairs  This will help you see the stairs better  Follow up with your healthcare provider as directed:  Write down your questions so you remember to ask them during your visits     © 2017 [facility] Information is for End User's use only and may not be sold, redistributed or otherwise used for commercial purposes  All illustrations and images included in CareNotes® are the copyrighted property of A [employer] A M [employer]  or Mono Thompson  The above information is an  only  It is not intended as medical advice for individual conditions or treatments  Talk to your doctor, nurse or pharmacist before following any medical regimen to see if it is safe and effective for you  Advance Directives   WHAT YOU NEED TO KNOW:   What are advance directives? Advance directives are legal documents that state your wishes and plans for medical care  These plans are made ahead of time in case you lose your ability to make decisions for yourself  Advance directives can apply to any medical decision, such as the treatments you want, and if you want to donate organs  What are the types of advance directives? There are many types of advance directives, and each state has rules about how to use them  You may choose a combination of any of the following:  · Living will: This is a written record of the treatment you want  You can also choose which treatments you do not want, which to limit, and which to stop at a certain time  This includes surgery, medicine, IV fluid, and tube feedings  · Durable power of  for healthcare [facility]): This is a written record that states who you want to make healthcare choices for you when you are unable to make them for yourself  This person, called a proxy, is usually a family member or a friend  You may choose more than 1 proxy  · Do not resuscitate (DNR) order:  A DNR order is used in case your heart stops beating or you stop breathing  It is a request not to have certain forms of treatment, such as CPR  A DNR order may be included in other types of advance directives  · Medical directive:   This covers the care that you want if you are in a coma, near death, or unable to make decisions for yourself  You can list the treatments you want for each condition  Treatment may include pain medicine, surgery, blood transfusions, dialysis, IV or tube feedings, and a ventilator (breathing machine)  · Values history: This document has questions about your views, beliefs, and how you feel and think about life  This information can help others choose the care that you would choose  Why are advance directives important? An advance directive helps you control your care  Although spoken wishes may be used, it is better to have your wishes written down  Spoken wishes can be misunderstood, or not followed  Treatments may be given even if you do not want them  An advance directive may make it easier for your family to make difficult choices about your care  How do I decide what to put in my advance directives? · Make informed decisions:  Make sure you fully understand treatments or care you may receive  Think about the benefits and problems your decisions could cause for you or your family  Talk to healthcare providers if you have concerns or questions before you write down your wishes  You may also want to talk with your [employer] or , or a   Check your state laws to make sure that what you put in your advance directive is legal      · Sign all forms:  Sign and date your advance directive when you have finished  You may also need 2 witnesses to sign the forms  Witnesses cannot be your doctor or his staff, your spouse, heirs or beneficiaries, people you owe money to, or your chosen proxy  Talk to your family, proxy, and healthcare providers about your advance directive  Give each person a copy, and keep one for yourself in a place you can get to easily  Do not keep it hidden or locked away  · Review and revise your plans: You can revise your advance directive at any time, as long as you are able to make decisions   Review your plan every year, and when there are changes in your life, or your health  When you make changes, let your family, proxy, and healthcare providers know  Give each a new copy  Where can I find more information? · [employer]  [address] , [place] [address]  Phone: 7- [phone]  Phone: 7- [phone]  Web Address: http://www  aafp org  · [address] [facility])  [address] [facility], [address] [facility] , [address] [place] [address]  Phone: [phone]  Phone: [phone]  Web Address: Susy lay  CARE AGREEMENT:   You have the right to help plan your care  To help with this plan, you must learn about your health condition and treatment options  You must also learn about advance directives and how they are used  Work with your healthcare providers to decide what care will be used to treat you  You always have the right to refuse treatment  The above information is an  only  It is not intended as medical advice for individual conditions or treatments  Talk to your doctor, nurse or pharmacist before following any medical regimen to see if it is safe and effective for you  © 2017 [employer] Information is for End User's use only and may not be sold, redistributed or otherwise used for commercial purposes  All illustrations and images included in CareNotes® are the copyrighted property of A D A M [employer] Inc  or Mono Thompson

## 2018-10-19 ENCOUNTER — TELEPHONE (OUTPATIENT)
Dept: FAMILY MEDICINE CLINIC | Facility: CLINIC | Age: 73
End: 2018-10-19

## 2018-10-19 NOTE — TELEPHONE ENCOUNTER
She was in on Wednesday she got a rx for  Amlodipine 5mg, she thought she said she was going to increase to 10mg she wants to know what she should do, should it be 5mg or 10mg

## 2018-10-19 NOTE — TELEPHONE ENCOUNTER
Don't see that in Ev's note at all   I would recommend take the 5mg daily and f/u in 1 month with Xochitl Lisa as advised

## 2018-11-16 DIAGNOSIS — Z12.11 SCREEN FOR COLON CANCER: Primary | ICD-10-CM

## 2018-11-23 DIAGNOSIS — I10 ESSENTIAL HYPERTENSION: ICD-10-CM

## 2018-11-23 RX ORDER — AMLODIPINE BESYLATE 5 MG/1
5 TABLET ORAL DAILY
Qty: 30 TABLET | Refills: 0 | Status: SHIPPED | OUTPATIENT
Start: 2018-11-23 | End: 2018-12-20

## 2018-11-23 NOTE — TELEPHONE ENCOUNTER
LM with a man stating that the prescription refill was sent in to [facility] and next appointment needs to be kept for future refills  Thank you

## 2018-11-29 ENCOUNTER — LAB REQUISITION (OUTPATIENT)
Dept: LAB | Facility: HOSPITAL | Age: 73
End: 2018-11-29
Payer: COMMERCIAL

## 2018-11-29 DIAGNOSIS — Z12.11 ENCOUNTER FOR SCREENING FOR MALIGNANT NEOPLASM OF COLON: ICD-10-CM

## 2018-11-29 LAB — HEMOCCULT STL QL IA: NEGATIVE

## 2018-11-29 PROCEDURE — G0328 FECAL BLOOD SCRN IMMUNOASSAY: HCPCS | Performed by: PHYSICIAN ASSISTANT

## 2018-12-05 ENCOUNTER — OFFICE VISIT (OUTPATIENT)
Dept: LAB | Age: 73
End: 2018-12-05
Payer: COMMERCIAL

## 2018-12-05 ENCOUNTER — APPOINTMENT (OUTPATIENT)
Dept: LAB | Facility: CLINIC | Age: 73
End: 2018-12-05
Payer: COMMERCIAL

## 2018-12-05 ENCOUNTER — TRANSCRIBE ORDERS (OUTPATIENT)
Dept: ADMINISTRATIVE | Age: 73
End: 2018-12-05

## 2018-12-05 DIAGNOSIS — R05.8 DRY COUGH: ICD-10-CM

## 2018-12-05 DIAGNOSIS — E78.9 BORDERLINE HIGH CHOLESTEROL: ICD-10-CM

## 2018-12-05 DIAGNOSIS — R53.83 OTHER FATIGUE: ICD-10-CM

## 2018-12-05 DIAGNOSIS — R00.2 INTERMITTENT PALPITATIONS: ICD-10-CM

## 2018-12-05 DIAGNOSIS — I10 ESSENTIAL HYPERTENSION: ICD-10-CM

## 2018-12-05 DIAGNOSIS — E55.9 VITAMIN D INSUFFICIENCY: ICD-10-CM

## 2018-12-05 LAB
25(OH)D3 SERPL-MCNC: 32.7 NG/ML (ref 30–100)
ALBUMIN SERPL BCP-MCNC: 3.6 G/DL (ref 3.5–5)
ALP SERPL-CCNC: 95 U/L (ref 46–116)
ALT SERPL W P-5'-P-CCNC: 26 U/L (ref 12–78)
ANION GAP SERPL CALCULATED.3IONS-SCNC: 5 MMOL/L (ref 4–13)
AST SERPL W P-5'-P-CCNC: 19 U/L (ref 5–45)
ATRIAL RATE: 54 BPM
BASOPHILS # BLD AUTO: 0.05 THOUSANDS/ΜL (ref 0–0.1)
BASOPHILS NFR BLD AUTO: 1 % (ref 0–1)
BILIRUB SERPL-MCNC: 0.36 MG/DL (ref 0.2–1)
BUN SERPL-MCNC: 19 MG/DL (ref 5–25)
CALCIUM SERPL-MCNC: 9.6 MG/DL (ref 8.3–10.1)
CHLORIDE SERPL-SCNC: 104 MMOL/L (ref 100–108)
CHOLEST SERPL-MCNC: 244 MG/DL (ref 50–200)
CO2 SERPL-SCNC: 27 MMOL/L (ref 21–32)
CREAT SERPL-MCNC: 1.08 MG/DL (ref 0.6–1.3)
EOSINOPHIL # BLD AUTO: 0.13 THOUSAND/ΜL (ref 0–0.61)
EOSINOPHIL NFR BLD AUTO: 3 % (ref 0–6)
ERYTHROCYTE [DISTWIDTH] IN BLOOD BY AUTOMATED COUNT: 11.6 % (ref 11.6–15.1)
GFR SERPL CREATININE-BSD FRML MDRD: 51 ML/MIN/1.73SQ M
GLUCOSE P FAST SERPL-MCNC: 102 MG/DL (ref 65–99)
HCT VFR BLD AUTO: 39 % (ref 34.8–46.1)
HDLC SERPL-MCNC: 91 MG/DL (ref 40–60)
HGB BLD-MCNC: 12.9 G/DL (ref 11.5–15.4)
IMM GRANULOCYTES # BLD AUTO: 0.02 THOUSAND/UL (ref 0–0.2)
IMM GRANULOCYTES NFR BLD AUTO: 0 % (ref 0–2)
IRON SERPL-MCNC: 125 UG/DL (ref 50–170)
LDLC SERPL CALC-MCNC: 138 MG/DL (ref 0–100)
LYMPHOCYTES # BLD AUTO: 1.55 THOUSANDS/ΜL (ref 0.6–4.47)
LYMPHOCYTES NFR BLD AUTO: 34 % (ref 14–44)
MCH RBC QN AUTO: 33 PG (ref 26.8–34.3)
MCHC RBC AUTO-ENTMCNC: 33.1 G/DL (ref 31.4–37.4)
MCV RBC AUTO: 100 FL (ref 82–98)
MONOCYTES # BLD AUTO: 0.35 THOUSAND/ΜL (ref 0.17–1.22)
MONOCYTES NFR BLD AUTO: 8 % (ref 4–12)
NEUTROPHILS # BLD AUTO: 2.51 THOUSANDS/ΜL (ref 1.85–7.62)
NEUTS SEG NFR BLD AUTO: 54 % (ref 43–75)
NONHDLC SERPL-MCNC: 153 MG/DL
NRBC BLD AUTO-RTO: 0 /100 WBCS
P AXIS: 73 DEGREES
PLATELET # BLD AUTO: 305 THOUSANDS/UL (ref 149–390)
PMV BLD AUTO: 10.6 FL (ref 8.9–12.7)
POTASSIUM SERPL-SCNC: 4.4 MMOL/L (ref 3.5–5.3)
PR INTERVAL: 146 MS
PROT SERPL-MCNC: 6.9 G/DL (ref 6.4–8.2)
QRS AXIS: 41 DEGREES
QRSD INTERVAL: 68 MS
QT INTERVAL: 442 MS
QTC INTERVAL: 419 MS
RBC # BLD AUTO: 3.91 MILLION/UL (ref 3.81–5.12)
SODIUM SERPL-SCNC: 136 MMOL/L (ref 136–145)
T WAVE AXIS: 47 DEGREES
TRIGL SERPL-MCNC: 77 MG/DL
TSH SERPL DL<=0.05 MIU/L-ACNC: 1.74 UIU/ML (ref 0.36–3.74)
VENTRICULAR RATE: 54 BPM
WBC # BLD AUTO: 4.61 THOUSAND/UL (ref 4.31–10.16)

## 2018-12-05 PROCEDURE — 86803 HEPATITIS C AB TEST: CPT | Performed by: PHYSICIAN ASSISTANT

## 2018-12-05 PROCEDURE — 93005 ELECTROCARDIOGRAM TRACING: CPT

## 2018-12-05 PROCEDURE — 80053 COMPREHEN METABOLIC PANEL: CPT

## 2018-12-05 PROCEDURE — 84443 ASSAY THYROID STIM HORMONE: CPT

## 2018-12-05 PROCEDURE — 85025 COMPLETE CBC W/AUTO DIFF WBC: CPT

## 2018-12-05 PROCEDURE — 83540 ASSAY OF IRON: CPT

## 2018-12-05 PROCEDURE — 80061 LIPID PANEL: CPT

## 2018-12-05 PROCEDURE — 36415 COLL VENOUS BLD VENIPUNCTURE: CPT

## 2018-12-05 PROCEDURE — 82306 VITAMIN D 25 HYDROXY: CPT

## 2018-12-05 PROCEDURE — 93010 ELECTROCARDIOGRAM REPORT: CPT | Performed by: INTERNAL MEDICINE

## 2018-12-06 LAB — HCV AB SER QL: NORMAL

## 2018-12-12 ENCOUNTER — OFFICE VISIT (OUTPATIENT)
Dept: OBGYN CLINIC | Facility: HOSPITAL | Age: 73
End: 2018-12-12
Payer: COMMERCIAL

## 2018-12-12 VITALS
HEIGHT: 63 IN | WEIGHT: 119.4 LBS | BODY MASS INDEX: 21.16 KG/M2 | HEART RATE: 55 BPM | SYSTOLIC BLOOD PRESSURE: 156 MMHG | DIASTOLIC BLOOD PRESSURE: 84 MMHG

## 2018-12-12 DIAGNOSIS — M65.332 TRIGGER MIDDLE FINGER OF LEFT HAND: ICD-10-CM

## 2018-12-12 PROCEDURE — 99203 OFFICE O/P NEW LOW 30 MIN: CPT | Performed by: ORTHOPAEDIC SURGERY

## 2018-12-12 RX ORDER — LIDOCAINE HYDROCHLORIDE AND EPINEPHRINE 10; 10 MG/ML; UG/ML
10 INJECTION, SOLUTION INFILTRATION; PERINEURAL ONCE
Status: CANCELLED | OUTPATIENT
Start: 2018-12-12 | End: 2018-12-12

## 2018-12-12 NOTE — PROGRESS NOTES
ASSESSMENT/PLAN:    Assessment:   Trigger Finger  left  long finger    Plan:   Trigger Finger Release  left  long finger under local     Follow Up: After Surgery    To Do Next Visit:    and Splint off    General Discussions:       Operative Discussions:     Trigger Finger Release: The anatomy and physiology of trigger finger was discussed with the patient today in the office  Edema and increased contact pressure within the flexor tendons at the A1 pulley can cause pain, crepitation, and limitation of function  Treatment options include resting MP blocking splints to decrease edema, oral anti-inflammatory medications, home or formal therapy exercises, up to 2 steroid injections or surgical release  While majority of patients do respond to conservative treatment, up to 20% may require surgical release  The patient has elected release of the trigger finger  The patient has elected to undergo a release of the A1 pulley (trigger finger)  A small incision will be made over the palmar aspect of the hand, the tendon sheath holding the flexor tendons will be released  In the postoperative period, light activities are allowed immediately, driving is allowed when narcotic medication has stopped, and the incision may get wet after 2 days  Heavy activities (lifting more than approximately 10 pounds) will be allowed after the follow up appointment in 1-2 weeks  While the pain and discomfort within the wrist typically improves rapidly, some residual discomfort may be present for up to 6 weeks  The nodule that is typically palpable in the palmar aspect of the hand will not be removed, as this would necessitate removal of a portion of the flexor tendon, however the catching, clicking, and locking should resolve  Approximate success rate is 98%  The risks and benefits of the procedure were explained to the patient, which include, but are not limited to: Bleeding, infection, recurrence, pain, scar, damage to tendons, damage to nerves, and damage to blood vessels, need for future surgery and complications related to anesthesia  If bony work is done, risks also include malunion and nonunion  These risks, along with alternative conservative treatment options, and postoperative protocols were voiced back and understood by the patient  All questions were answered to the patient's satisfaction  The patient agrees to comply with a standard postoperative protocol, and is willing to proceed  Education was provided via written and auditory forms  There were no barriers to learning  Written handouts regarding wound care, incision and scar care, and general preoperative information, as well as risks and benefits were provided to the patient       _____________________________________________________  CHIEF COMPLAINT:  Chief Complaint   Patient presents with    Left Middle Finger - Pain, Locking         SUBJECTIVE:  Clemente Marrero is a 75y o  year old female who presents with Pain  Severe  Intermittant  Sharp and Locking to the left long finger  This started  1 year(s) ago as Chronic  Patient states about a year ago her finger locked up on her for 6 weeks where she left it go in a locked position  She then tried to straighter her finger manually which she states was extremely painful  Patient then wrapped toliet paper and rubber bands to keep it straight for a few more weeks  Patient states that the locking of her finger still happens multiple times a day  Pt denies numbness and tingling  Radiation: None  Previous Treatments: splinting with a splint and bubble wrap without relief  Associated symptoms: Stiffness/LROM    PAST MEDICAL HISTORY:  No past medical history on file      PAST SURGICAL HISTORY:  Past Surgical History:   Procedure Laterality Date    APPENDECTOMY      CATARACT EXTRACTION      LAPAROSCOPIC APPENDECTOMY      incidental     TONSILLECTOMY      TUBAL LIGATION         FAMILY HISTORY:  Family History   Problem Relation Age of Onset    Lung cancer Mother     Hypertension Mother     Cancer Mother     Cerebral aneurysm Father         bleed    Alcohol abuse Father     Cirrhosis Father     Other Father         had fallen   Chris Mclaughlin Other Sister         unkownn cause     Pneumonia Brother         infancy of pneumonia    Other Sister         in MVA       SOCIAL HISTORY:  Social History   Substance Use Topics    Smoking status: Former Smoker     Packs/day: 1 00    Smokeless tobacco: Never Used      Comment: age 14-35     Alcohol use 0 6 - 2 4 oz/week     1 - 4 Standard drinks or equivalent per week      Comment: 1-4 drinks per day        MEDICATIONS:    Current Outpatient Prescriptions:     amLODIPine (NORVASC) 5 mg tablet, Take 1 tablet (5 mg total) by mouth daily, Disp: 30 tablet, Rfl: 0    Calcium-Vitamin D-Vitamin K (VIACTIV) 727-140-34 MG-UNT-MCG CHEW, Chew, Disp: , Rfl:     cetirizine (ZYRTEC ALLERGY) 10 mg tablet, Take by mouth, Disp: , Rfl:     diphenhydrAMINE (BENADRYL) 25 mg capsule, Take by mouth, Disp: , Rfl:     fluticasone (FLONASE) 50 mcg/act nasal spray, into each nostril, Disp: , Rfl:     Ibuprofen (ADVIL) 200 MG CAPS, Take by mouth, Disp: , Rfl:     Multiple Vitamins-Minerals (CENTRUM SILVER 50+WOMEN PO), Take by mouth, Disp: , Rfl:     ALLERGIES:  Allergies   Allergen Reactions    Cat Hair Extract     Dust Mite Extract     Iv Dye  [Iodinated Diagnostic Agents] Hives     Category: Allergy;     Pollen Extract      Seasonal allergies       REVIEW OF SYSTEMS:  Pertinent items are noted in HPI      LABS:  HgA1c: No results found for: HGBA1C  BMP:   Lab Results   Component Value Date    CALCIUM 9 6 12/05/2018    K 4 4 12/05/2018    CO2 27 12/05/2018     12/05/2018    BUN 19 12/05/2018    CREATININE 1 08 12/05/2018         _____________________________________________________  PHYSICAL EXAMINATION:  General: well developed and well nourished, alert, oriented times 3 and appears comfortable  Psychiatric: Normal  HEENT: Trachea Midline, No torticollis  Cardiovascular: No discernable arrhythmia  Pulmonary: No wheezing or stridor  Skin: nodule with crepitation to left long finger A1 pulley   Neurovascular: Sensation Intact to the Median, Ulnar, Radial Nerve, Motor Intact to the Median, Ulnar, Radial Nerve and Pulses Intact    MUSCULOSKELETAL EXAMINATION:  LEFT SIDE:  Finger:  No instability, Tenderness to A1 pulley of left long finger, Triggering  long finger and Nodules  long finger    _____________________________________________________  STUDIES REVIEWED:  No Studies to review      PROCEDURES PERFORMED:  Procedures  No Procedures performed today   Scribe Attestation    I,:   Li Charlton am acting as a scribe while in the presence of the attending physician :        I,:   Zacarias Vale MD personally performed the services described in this documentation    as scribed in my presence :

## 2018-12-12 NOTE — H&P
ASSESSMENT/PLAN:    Assessment:   Trigger Finger  left  long finger    Plan:   Trigger Finger Release  left  long finger under local     Follow Up: After Surgery    To Do Next Visit:    and Splint off    General Discussions:       Operative Discussions:     Trigger Finger Release: The anatomy and physiology of trigger finger was discussed with the patient today in the office  Edema and increased contact pressure within the flexor tendons at the A1 pulley can cause pain, crepitation, and limitation of function  Treatment options include resting MP blocking splints to decrease edema, oral anti-inflammatory medications, home or formal therapy exercises, up to 2 steroid injections or surgical release  While majority of patients do respond to conservative treatment, up to 20% may require surgical release  The patient has elected release of the trigger finger  The patient has elected to undergo a release of the A1 pulley (trigger finger)  A small incision will be made over the palmar aspect of the hand, the tendon sheath holding the flexor tendons will be released  In the postoperative period, light activities are allowed immediately, driving is allowed when narcotic medication has stopped, and the incision may get wet after 2 days  Heavy activities (lifting more than approximately 10 pounds) will be allowed after the follow up appointment in 1-2 weeks  While the pain and discomfort within the wrist typically improves rapidly, some residual discomfort may be present for up to 6 weeks  The nodule that is typically palpable in the palmar aspect of the hand will not be removed, as this would necessitate removal of a portion of the flexor tendon, however the catching, clicking, and locking should resolve  Approximate success rate is 98%  The risks and benefits of the procedure were explained to the patient, which include, but are not limited to: Bleeding, infection, recurrence, pain, scar, damage to tendons, damage to nerves, and damage to blood vessels, need for future surgery and complications related to anesthesia  If bony work is done, risks also include malunion and nonunion  These risks, along with alternative conservative treatment options, and postoperative protocols were voiced back and understood by the patient  All questions were answered to the patient's satisfaction  The patient agrees to comply with a standard postoperative protocol, and is willing to proceed  Education was provided via written and auditory forms  There were no barriers to learning  Written handouts regarding wound care, incision and scar care, and general preoperative information, as well as risks and benefits were provided to the patient       _____________________________________________________  CHIEF COMPLAINT:  Chief Complaint   Patient presents with    Left Middle Finger - Pain, Locking         SUBJECTIVE:  Tory Preciado Cha is a 75y o  year old female who presents with Pain  Severe  Intermittant  Sharp and Locking to the left long finger  This started  1 year(s) ago as Chronic  Patient states about a year ago her finger locked up on her for 6 weeks where she left it go in a locked position  She then tried to straighter her finger manually which she states was extremely painful  Patient then wrapped toliet paper and rubber bands to keep it straight for a few more weeks  Patient states that the locking of her finger still happens multiple times a day  Pt denies numbness and tingling  Radiation: None  Previous Treatments: splinting with a splint and bubble wrap without relief  Associated symptoms: Stiffness/LROM    PAST MEDICAL HISTORY:  No past medical history on file      PAST SURGICAL HISTORY:  Past Surgical History:   Procedure Laterality Date    APPENDECTOMY      CATARACT EXTRACTION      LAPAROSCOPIC APPENDECTOMY      incidental     TONSILLECTOMY      TUBAL LIGATION         FAMILY HISTORY:  Family History   Problem Relation Age of Onset    Lung cancer Mother     Hypertension Mother     Cancer Mother     Cerebral aneurysm Father         bleed    Alcohol abuse Father     Cirrhosis Father     Other Father         had fallen   Mavis Cousin Other Sister         unkownn cause     Pneumonia Brother         infancy of pneumonia    Other Sister         in MVA       SOCIAL HISTORY:  Social History   Substance Use Topics    Smoking status: Former Smoker     Packs/day: 1 00    Smokeless tobacco: Never Used      Comment: age 14-35     Alcohol use 0 6 - 2 4 oz/week     1 - 4 Standard drinks or equivalent per week      Comment: 1-4 drinks per day        MEDICATIONS:    Current Outpatient Prescriptions:     amLODIPine (NORVASC) 5 mg tablet, Take 1 tablet (5 mg total) by mouth daily, Disp: 30 tablet, Rfl: 0    Calcium-Vitamin D-Vitamin K (VIACTIV) 598-026-19 MG-UNT-MCG CHEW, Chew, Disp: , Rfl:     cetirizine (ZYRTEC ALLERGY) 10 mg tablet, Take by mouth, Disp: , Rfl:     diphenhydrAMINE (BENADRYL) 25 mg capsule, Take by mouth, Disp: , Rfl:     fluticasone (FLONASE) 50 mcg/act nasal spray, into each nostril, Disp: , Rfl:     Ibuprofen (ADVIL) 200 MG CAPS, Take by mouth, Disp: , Rfl:     Multiple Vitamins-Minerals (CENTRUM SILVER 50+WOMEN PO), Take by mouth, Disp: , Rfl:     ALLERGIES:  Allergies   Allergen Reactions    Cat Hair Extract     Dust Mite Extract     Iv Dye  [Iodinated Diagnostic Agents] Hives     Category: Allergy;     Pollen Extract      Seasonal allergies       REVIEW OF SYSTEMS:  Pertinent items are noted in HPI      LABS:  HgA1c: No results found for: HGBA1C  BMP:   Lab Results   Component Value Date    CALCIUM 9 6 12/05/2018    K 4 4 12/05/2018    CO2 27 12/05/2018     12/05/2018    BUN 19 12/05/2018    CREATININE 1 08 12/05/2018         _____________________________________________________  PHYSICAL EXAMINATION:  General: well developed and well nourished, alert, oriented times 3 and appears comfortable  Psychiatric: Normal  HEENT: Trachea Midline, No torticollis  Cardiovascular: No discernable arrhythmia  Pulmonary: No wheezing or stridor  Skin: nodule with crepitation to left long finger A1 pulley   Neurovascular: Sensation Intact to the Median, Ulnar, Radial Nerve, Motor Intact to the Median, Ulnar, Radial Nerve and Pulses Intact    MUSCULOSKELETAL EXAMINATION:  LEFT SIDE:  Finger:  No instability, Tenderness to A1 pulley of left long finger, Triggering  long finger and Nodules  long finger    _____________________________________________________  STUDIES REVIEWED:  No Studies to review      PROCEDURES PERFORMED:  Procedures  No Procedures performed today   Scribe Attestation    I,:   Azul De La Cruz am acting as a scribe while in the presence of the attending physician :        I,:   Sally Farah MD personally performed the services described in this documentation    as scribed in my presence :

## 2018-12-12 NOTE — PATIENT INSTRUCTIONS
Trigger Finger   WHAT YOU NEED TO KNOW:   What is trigger finger? Trigger finger is when your finger or thumb gets stuck in a bent position and snaps, pops, or clicks when you straighten it  What causes trigger finger? Trigger finger is caused by narrowing of the tendon sheath  The tendon sheath is the tunnel that your tendon slides through when you bend or straighten your finger  A tendon is strong tissue that attaches muscle to bone  When the tendon sheath narrows, the tendon does not slide as easily  Certain medical conditions, such as diabetes or arthritis, may increase your risk of trigger finger  What are the signs and symptoms of trigger finger? · Clicking, snapping, or popping noise when you move your finger    · Finger stuck in a bent position    · Pain    · Swelling and stiffness    · A bump at the base of your finger  How is trigger finger diagnosed? Your healthcare provider will ask about your health history and examine your finger  He will ask about your signs and symptoms and have you bend and straighten your finger  How is trigger finger treated? · Medicines:      ¨ NSAIDs:  These medicines decrease pain and swelling  NSAIDs can be bought without a doctor's order  Ask which medicine is right for you and how much to take  Take as directed  NSAIDs can cause stomach bleeding or kidney problems if not taken correctly  ¨ Steroid injection: This medicine helps decrease inflammation  It is given as a shot into your finger  You may need more than 1 injection  · Splint:  You may need to wear a splint for up to 6 weeks to keep your finger straight  This will help your finger joints rest and prevent you from bending your finger while you sleep  · Physical therapy:  A physical therapist teaches you exercises to help improve movement and strength, and to decrease pain  · Tendon release: This is surgery to cut open a small piece of the tendon sheath so that your tendon can slide smoothly  Your healthcare provider may do this through an incision or with a needle  What are the risks of trigger finger? Splinting may not decrease your signs and symptoms  Steroid injections or tendon release surgery may damage the tendon or nerves in your finger  After tendon release surgery, your finger may be stiff, painful, or weak  Your finger may be bruised and you may get an infection  Your signs and symptoms may return, even after treatment  Without treatment, your symptoms can get worse  Your finger may become locked in the bent position  When should I contact my healthcare provider? · Your symptoms do not go away or they return, even after treatment  · The pain, swelling, or stiffness interferes with your daily activities  · You have more trouble moving your finger  · Your finger is tingling  · You have questions or concerns about your condition or care  When should I seek immediate care? · You cannot move your finger at all  · Your finger is numb  CARE AGREEMENT:   You have the right to help plan your care  Learn about your health condition and how it may be treated  Discuss treatment options with your caregivers to decide what care you want to receive  You always have the right to refuse treatment  The above information is an  only  It is not intended as medical advice for individual conditions or treatments  Talk to your doctor, nurse or pharmacist before following any medical regimen to see if it is safe and effective for you  © 2017 [employer]  Information is for End User's use only and may not be sold, redistributed or otherwise used for commercial purposes  All illustrations and images included in CareNotes® are the copyrighted property of [employer]  or [employer]

## 2018-12-20 ENCOUNTER — OFFICE VISIT (OUTPATIENT)
Dept: FAMILY MEDICINE CLINIC | Facility: CLINIC | Age: 73
End: 2018-12-20
Payer: COMMERCIAL

## 2018-12-20 VITALS
BODY MASS INDEX: 21.42 KG/M2 | RESPIRATION RATE: 16 BRPM | TEMPERATURE: 98 F | DIASTOLIC BLOOD PRESSURE: 70 MMHG | OXYGEN SATURATION: 97 % | HEART RATE: 66 BPM | SYSTOLIC BLOOD PRESSURE: 140 MMHG | WEIGHT: 119 LBS

## 2018-12-20 DIAGNOSIS — Z12.39 SCREENING FOR BREAST CANCER: Primary | ICD-10-CM

## 2018-12-20 DIAGNOSIS — Z12.11 SCREEN FOR COLON CANCER: ICD-10-CM

## 2018-12-20 DIAGNOSIS — I10 BENIGN ESSENTIAL HYPERTENSION: ICD-10-CM

## 2018-12-20 PROCEDURE — 99214 OFFICE O/P EST MOD 30 MIN: CPT | Performed by: PHYSICIAN ASSISTANT

## 2018-12-20 RX ORDER — LISINOPRIL 10 MG/1
10 TABLET ORAL DAILY
Qty: 90 TABLET | Refills: 1 | Status: SHIPPED | OUTPATIENT
Start: 2018-12-20 | End: 2019-06-15 | Stop reason: SDUPTHER

## 2018-12-20 NOTE — PATIENT INSTRUCTIONS
You can try Red yeast rice to help improve cholesterol  Heart Healthy Diet   AMBULATORY CARE:   A heart healthy diet  is an eating plan low in total fat, unhealthy fats, and sodium (salt)  A heart healthy diet helps decrease your risk for heart disease and stroke  Limit the amount of fat you eat to 25% to 35% of your total daily calories  Limit sodium to less than 2,300 mg each day  Healthy fats:  Healthy fats can help improve cholesterol levels  The risk for heart disease is decreased when cholesterol levels are normal  Choose healthy fats, such as the following:  · Unsaturated fat  is found in foods such as soybean, canola, olive, corn, and safflower oils  It is also found in soft tub margarine that is made with liquid vegetable oil  · Omega-3 fat  is found in certain fish, such as salmon, tuna, and trout, and in walnuts and flaxseed  Unhealthy fats:  Unhealthy fats can cause unhealthy cholesterol levels in your blood and increase your risk of heart disease  Limit unhealthy fats, such as the following:  · Cholesterol  is found in animal foods, such as eggs and lobster, and in dairy products made from whole milk  Limit cholesterol to less than 300 milligrams (mg) each day  You may need to limit cholesterol to 200 mg each day if you have heart disease  · Saturated fat  is found in meats, such as kim and hamburger  It is also found in chicken or turkey skin, whole milk, and butter  Limit saturated fat to less than 7% of your total daily calories  Limit saturated fat to less than 6% if you have heart disease or are at increased risk for it  · Trans fat  is found in packaged foods, such as potato chips and cookies  It is also in hard margarine, some fried foods, and shortening  Avoid trans fats as much as possible    Heart healthy foods and drinks to include:  Ask your dietitian or healthcare provider how many servings to have from each of the following food groups:  · Grains:      ¨ Whole-wheat breads, cereals, and pastas, and brown rice    ¨ Low-fat, low-sodium crackers and chips    · Vegetables:      ¨ Broccoli, green beans, green peas, and spinach    ¨ Collards, kale, and lima beans    ¨ Carrots, sweet potatoes, tomatoes, and peppers    ¨ Canned vegetables with no salt added    · Fruits:      ¨ Bananas, peaches, pears, and pineapple    ¨ Grapes, raisins, and dates    ¨ Oranges, tangerines, grapefruit, orange juice, and grapefruit juice    ¨ Apricots, mangoes, melons, and papaya    ¨ Raspberries and strawberries    ¨ Canned fruit with no added sugar    · Low-fat dairy products:      ¨ Nonfat (skim) milk, 1% milk, and low-fat almond, cashew, or soy milks fortified with calcium    ¨ Low-fat cheese, regular or frozen yogurt, and cottage cheese    · Meats and proteins , such as lean cuts of beef and pork (loin, leg, round), skinless chicken and turkey, legumes, soy products, egg whites, and nuts  Foods and drinks to limit or avoid:  Ask your dietitian or healthcare provider about these and other foods that are high in unhealthy fat, sodium, and sugar:  · Snack or packaged foods , such as frozen dinners, cookies, macaroni and cheese, and cereals with more than 300 mg of sodium per serving    · Canned or dry mixes  for cakes, soups, sauces, or gravies    · Vegetables with added sodium , such as instant potatoes, vegetables with added sauces, or regular canned vegetables    · Other foods high in sodium , such as ketchup, barbecue sauce, salad dressing, pickles, olives, soy sauce, and miso    · High-fat dairy foods  such as whole or 2% milk, cream cheese, or sour cream, and cheeses     · High-fat protein foods  such as high-fat cuts of beef (T-bone steaks, ribs), chicken or turkey with skin, and organ meats, such as liver    · Cured or smoked meats , such as hot dogs, kim, and sausage    · Unhealthy fats and oils , such as butter, stick margarine, shortening, and cooking oils such as coconut or palm oil    · Food and drinks high in sugar , such as soft drinks (soda), sports drinks, sweetened tea, candy, cake, cookies, pies, and doughnuts  Other diet guidelines to follow:   · Eat more foods containing omega-3 fats  Eat fish high in omega-3 fats at least 2 times a week  · Limit alcohol  Too much alcohol can damage your heart and raise your blood pressure  Women should limit alcohol to 1 drink a day  Men should limit alcohol to 2 drinks a day  A drink of alcohol is 12 ounces of beer, 5 ounces of wine, or 1½ ounces of liquor  · Choose low-sodium foods  High-sodium foods can lead to high blood pressure  Add little or no salt to food you prepare  Use herbs and spices in place of salt  · Eat more fiber  to help lower cholesterol levels  Eat at least 5 servings of fruits and vegetables each day  Eat 3 ounces of whole-grain foods each day  Legumes (beans) are also a good source of fiber  Lifestyle guidelines:   · Do not smoke  Nicotine and other chemicals in cigarettes and cigars can cause lung and heart damage  Ask your healthcare provider for information if you currently smoke and need help to quit  E-cigarettes or smokeless tobacco still contain nicotine  Talk to your healthcare provider before you use these products  · Exercise regularly  to help you maintain a healthy weight and improve your blood pressure and cholesterol levels  Ask your healthcare provider about the best exercise plan for you  Do not start an exercise program without asking your healthcare provider  Follow up with your healthcare provider as directed:  Write down your questions so you remember to ask them during your visits  © 2017 [facility] Information is for End User's use only and may not be sold, redistributed or otherwise used for commercial purposes  All illustrations and images included in CareNotes® are the copyrighted property of [employer]  or [employer]    The above information is an  only  It is not intended as medical advice for individual conditions or treatments  Talk to your doctor, nurse or pharmacist before following any medical regimen to see if it is safe and effective for you

## 2018-12-21 DIAGNOSIS — I10 ESSENTIAL HYPERTENSION: ICD-10-CM

## 2018-12-22 RX ORDER — AMLODIPINE BESYLATE 5 MG/1
TABLET ORAL
Qty: 30 TABLET | Refills: 0 | OUTPATIENT
Start: 2018-12-22

## 2019-01-02 NOTE — PROGRESS NOTES
Routine Follow-up    Margarito Nieves 75 y o  female   Date:  12 20 2018      Assessment and Plan:    Anna Guerrero was seen today for follow-up  Diagnoses and all orders for this visit:    Screening for breast cancer  - awaiting mammogram, script already given at prior appt     Screen for colon cancer  - fit test negative Oct    Elevated LDL  - will continue to monitor, improve diet and regular exercise    Benign essential hypertension  -    increase lisinopril (ZESTRIL) 10 mg tablet; Take 1 tablet (10 mg total) by mouth daily  - return in 1 month for BP recheck                   HPI:  Chief Complaint   Patient presents with    Follow-up     HPI   Patient is a 76 yo female who presents for BP recheck  She was switched from her lisinopril at last visit due to cough  She reports that the cough did not go away after stopping the lisinopril so she went back to taking it as she did not like the norvasc and felt it made her feet swell  Bp not at goal  She had negative FIT test this year  She was given mammogram script, awaiting completion  Her labs were unrmarkable with exception of gluocse 103 and bad cholesterol above ideal goal      ROS: Review of Systems   Constitutional: Negative  Respiratory: Negative  Cardiovascular: Negative  Skin: Negative  Neurological: Negative  Hematological: Negative  No past medical history on file    Patient Active Problem List   Diagnosis    Abnormal EKG    Allergic rhinitis    Benign essential hypertension    Cataract    Intermittent palpitations    Lung density on x-ray    Osteopenia    Visual field scotoma of both eyes    Trigger middle finger of left hand       Past Surgical History:   Procedure Laterality Date    APPENDECTOMY      CATARACT EXTRACTION      LAPAROSCOPIC APPENDECTOMY      incidental     TONSILLECTOMY      TUBAL LIGATION         Social History     Social History    Marital status: /Civil Union     Spouse name: N/A    Number of children: 3    Years of education: N/A     Social History Main Topics    Smoking status: Former Smoker     Packs/day: 1 00    Smokeless tobacco: Never Used      Comment: age 13-35     Alcohol use 0 6 - 2 4 oz/week     1 - 4 Standard drinks or equivalent per week      Comment: 1-4 drinks per day     Drug use: No    Sexual activity: Not on file     Other Topics Concern    Not on file     Social History Narrative    Always uses seat belt     Caffeine use 2 cans/cups per day     Exercises regularly     Housewife or homemaker        Family History   Problem Relation Age of Onset    Lung cancer Mother     Hypertension Mother     Cancer Mother     Cerebral aneurysm Father         bleed    Alcohol abuse Father     Cirrhosis Father     Other Father         had fallen   Rubina Farley Other Sister         unkownn cause     Pneumonia Brother         infancy of pneumonia    Other Sister         in MVA       Allergies   Allergen Reactions    Cat Hair Extract     Dust Mite Extract     Iv Dye  [Iodinated Diagnostic Agents] Hives     Category: Allergy;     Pollen Extract      Seasonal allergies         Current Outpatient Prescriptions:     Calcium-Vitamin D-Vitamin K (VIACTIV) 801-917-25 MG-UNT-MCG CHEW, Chew, Disp: , Rfl:     cetirizine (ZYRTEC ALLERGY) 10 mg tablet, Take by mouth, Disp: , Rfl:     diphenhydrAMINE (BENADRYL) 25 mg capsule, Take by mouth, Disp: , Rfl:     fluticasone (FLONASE) 50 mcg/act nasal spray, into each nostril, Disp: , Rfl:     Ibuprofen (ADVIL) 200 MG CAPS, Take by mouth, Disp: , Rfl:     Multiple Vitamins-Minerals (CENTRUM SILVER 50+WOMEN PO), Take by mouth, Disp: , Rfl:     lisinopril (ZESTRIL) 10 mg tablet, Take 1 tablet (10 mg total) by mouth daily, Disp: 90 tablet, Rfl: 1      Physical Exam:  /70   Pulse 66   Temp 98 °F (36 7 °C)   Resp 16   Wt 54 kg (119 lb)   SpO2 97%   BMI 21 42 kg/m²     Physical Exam   Constitutional: She is oriented to person, place, and time   Vital signs are normal  She appears well-developed and well-nourished  No distress  HENT:   Head: Normocephalic and atraumatic  Right Ear: Tympanic membrane, external ear and ear canal normal    Left Ear: Tympanic membrane, external ear and ear canal normal    Nose: Nose normal    Mouth/Throat: Oropharynx is clear and moist    Eyes: Pupils are equal, round, and reactive to light  Conjunctivae and lids are normal    Neck: Trachea normal and normal range of motion  Neck supple  No thyromegaly present  Cardiovascular: Normal rate, regular rhythm, S1 normal, S2 normal and intact distal pulses  Exam reveals no gallop  No murmur heard  Pulmonary/Chest: Breath sounds normal  No respiratory distress  She has no wheezes  She has no rhonchi  She has no rales  Abdominal: Soft  Normal appearance and bowel sounds are normal  She exhibits no mass  There is no hepatosplenomegaly  There is no tenderness  Musculoskeletal: Normal range of motion  She exhibits no edema or deformity  Lymphadenopathy:     She has no cervical adenopathy  Neurological: She is alert and oriented to person, place, and time  She has normal reflexes  No cranial nerve deficit or sensory deficit  Skin: Skin is warm and dry  No rash noted  No cyanosis  No pallor  Nails show no clubbing  Psychiatric: She has a normal mood and affect   Her behavior is normal  Cognition and memory are normal          Labs:  Lab Results   Component Value Date    WBC 4 61 12/05/2018    HGB 12 9 12/05/2018    HCT 39 0 12/05/2018     (H) 12/05/2018     12/05/2018     Lab Results   Component Value Date    K 4 4 12/05/2018     12/05/2018    CO2 27 12/05/2018    BUN 19 12/05/2018    CREATININE 1 08 12/05/2018    GLUF 102 (H) 12/05/2018    CALCIUM 9 6 12/05/2018    AST 19 12/05/2018    ALT 26 12/05/2018    ALKPHOS 95 12/05/2018    EGFR 51 12/05/2018

## 2019-01-23 ENCOUNTER — CLINICAL SUPPORT (OUTPATIENT)
Dept: FAMILY MEDICINE CLINIC | Facility: CLINIC | Age: 74
End: 2019-01-23
Payer: COMMERCIAL

## 2019-01-23 VITALS — DIASTOLIC BLOOD PRESSURE: 60 MMHG | SYSTOLIC BLOOD PRESSURE: 120 MMHG

## 2019-01-23 DIAGNOSIS — I10 BENIGN ESSENTIAL HYPERTENSION: ICD-10-CM

## 2019-01-23 PROCEDURE — 99211 OFF/OP EST MAY X REQ PHY/QHP: CPT | Performed by: FAMILY MEDICINE

## 2019-01-23 NOTE — PROGRESS NOTES
Patient here for blood pressure check  Blood pressure checked on left are with a reading of 120/60  Patient did bring in blood pressure machine from home to make sure machine is reading write  Blood pressure rechecked with machine and received a reading of 122/61  Spoke with PCP whom did not want to change anything with patient and to have patient to follow up in June, 2019  Patient informed  Patient will have to check schedule at home and will call to schedule the appointment

## 2019-02-26 ENCOUNTER — HOSPITAL ENCOUNTER (OUTPATIENT)
Facility: HOSPITAL | Age: 74
Setting detail: OUTPATIENT SURGERY
Discharge: HOME | End: 2019-02-26
Attending: ORTHOPAEDIC SURGERY | Admitting: ORTHOPAEDIC SURGERY
Payer: COMMERCIAL

## 2019-02-26 VITALS
BODY MASS INDEX: 20.73 KG/M2 | OXYGEN SATURATION: 98 % | HEIGHT: 63 IN | TEMPERATURE: 98.5 F | RESPIRATION RATE: 16 BRPM | DIASTOLIC BLOOD PRESSURE: 66 MMHG | HEART RATE: 60 BPM | SYSTOLIC BLOOD PRESSURE: 145 MMHG | WEIGHT: 117 LBS

## 2019-02-26 DIAGNOSIS — M65.332 TRIGGER MIDDLE FINGER OF LEFT HAND: Primary | ICD-10-CM

## 2019-02-26 PROCEDURE — 26055 INCISE FINGER TENDON SHEATH: CPT | Performed by: ORTHOPAEDIC SURGERY

## 2019-02-26 PROCEDURE — 26055 INCISE FINGER TENDON SHEATH: CPT | Performed by: PHYSICIAN ASSISTANT

## 2019-02-26 RX ORDER — NAPROXEN SODIUM 220 MG
220 TABLET ORAL 2 TIMES DAILY WITH MEALS
Qty: 10 TABLET | Refills: 0 | Status: SHIPPED | OUTPATIENT
Start: 2019-02-26 | End: 2019-08-07

## 2019-02-26 RX ORDER — MAGNESIUM HYDROXIDE 1200 MG/15ML
LIQUID ORAL AS NEEDED
Status: DISCONTINUED | OUTPATIENT
Start: 2019-02-26 | End: 2019-02-26 | Stop reason: HOSPADM

## 2019-02-26 RX ORDER — HYDROCODONE BITARTRATE AND ACETAMINOPHEN 5; 325 MG/1; MG/1
1 TABLET ORAL EVERY 6 HOURS PRN
Qty: 5 TABLET | Refills: 0 | Status: SHIPPED | OUTPATIENT
Start: 2019-02-26 | End: 2019-03-03

## 2019-02-26 RX ORDER — SENNOSIDES 8.6 MG
650 CAPSULE ORAL EVERY 8 HOURS
Qty: 15 TABLET | Refills: 0 | Status: SHIPPED | OUTPATIENT
Start: 2019-02-26 | End: 2019-11-17

## 2019-02-26 RX ADMIN — LIDOCAINE HYDROCHLORIDE,EPINEPHRINE BITARTRATE: 10; .01 INJECTION, SOLUTION INFILTRATION; PERINEURAL at 07:48

## 2019-02-26 NOTE — OP NOTE
OPERATIVE REPORT  PATIENT NAME: Howie Marie  :  1945  MRN: [id number]  Pt Location: [facility]    SURGERY DATE: 19    Surgeon(s) and Role:     Byod Malik MD - Primary     * Lien Sánchez PA-C - Assisting    Pre-Op Diagnosis:  Trigger middle finger of left hand [M65 332]    Post-Op Diagnosis Codes:     * Trigger middle finger of left hand [M65 332]    Procedure(s):  LONG FINGER TRIGGER FINGER RELEASE (Left)    Specimen(s):  * No orders in the log *    Estimated Blood Loss:   Minimal      Anesthesia Type:   Local    Operative Indications: The patient has a history of Trigger Finger  left  long finger that was recalcitrant to conservative management  The decision was made to bring the patient to the operating room for Trigger Finger Release  left  long finger  Risks of the procedure were explained which include, but are not limited to bleeding; infection; damage to nerves, arteries,veins, tendons; scar; pain; need for reoperation; failure to give desired result; and risks of anaesthesia  All questions were answered to satisfaction and they were willing to proceed  Operative Findings:  Left long finger trigger finger    Complications:   None    Procedure and Technique:  After the patient, site, and procedure were identified, the patient was brought into the operating room in a supine position  Local anaesthesia was adminstered in the preoperative holding area  A tourniquet was not used  The  left upper extremity was then prepped and drapped in a normal, sterile, orthopedic fashion  After the patient, site, and procedure were once again identified, attention was turned to the left long finger  An incision was made over the flexor tendon sheath at the level of the A1 pulley  Dissection was carried out in-line with the flexor tendon sheath and the radial and ulnar digital artery and nerve were protected  The A1 pulley was identified at the base of the incision    Under direct visualization, the A1 pulley was divided along the midline in its entirety with care taken to avoid injury to the underlying tendon  The tendons were examined to ensure that no further catching, popping, clicking or locking occurred with motion of the finger  At the completion of the procedure, hemostasis was obtained with cautery and direct pressure  The wounds were copiously irrigated with sterile solution  The wounds were closed with Prolene  Sterile dressings were applied, including Xeroform, gauze, tweeners, webril, ACE  Please note, all sponge, needle, and instrument counts were correct prior to closure  Loupe magnification was utilized  The patient tolerated the procedure well       I was present for the entire procedure and A qualified resident physician was not available    Patient Disposition:  APU and hemodynamically stable    SIGNATURE: Candy Saldana MD  DATE: 02/26/19  TIME: 8:50 AM

## 2019-02-26 NOTE — H&P
H&P Exam - Orthopedics   Kassidy Nieves 75 y o  female MRN: [id number]  Unit/Bed#: APU 05    Assessment/Plan   Assessment:  Left long finger trigger finger  Plan:  Left long finger trigger finger release under local    History of Present Illness   HPI:  Jake James is a 75 y o  y o  female who presents with left long finger trigger finger that failed conservative management for release today   Historical Information  Review Of Systems:   · Skin: Normal  · Neuro: See HPI  · Musculoskeletal: See HPI  · 14 point review of systems negative except as stated above     Past Medical History:   History reviewed  No pertinent past medical history  Past Surgical History:   Past Surgical History:   Procedure Laterality Date    APPENDECTOMY      CATARACT EXTRACTION      LAPAROSCOPIC APPENDECTOMY      incidental     TONSILLECTOMY      TUBAL LIGATION         Family History:  Family history reviewed and non-contributory  Family History   Problem Relation Age of Onset    Lung cancer Mother     Hypertension Mother     Cancer Mother     Cerebral aneurysm Father         bleed    Alcohol abuse Father     Cirrhosis Father     Other Father         had fallen   [de-identified] Other Sister         unkownn cause     Pneumonia Brother         infancy of pneumonia    Other Sister         in MVA       Social History:  Social History     Socioeconomic History    Marital status: /Civil Union     Spouse name: None    Number of children: 3    Years of education: None    Highest education level: None   Occupational History    None   Social Needs    Financial resource strain: None    Food insecurity:     Worry: None     Inability: None    Transportation needs:     Medical: None     Non-medical: None   Tobacco Use    Smoking status: Former Smoker     Packs/day: 1 00    Smokeless tobacco: Never Used    Tobacco comment: age [age]    Substance and Sexual Activity    Alcohol use:  Yes     Alcohol/week: 0 6 - 2 4 oz     Types: 1 - 4 Standard drinks or equivalent per week     Comment: 1-4 drinks per day     Drug use: No    Sexual activity: None   Lifestyle    Physical activity:     Days per week: None     Minutes per session: None    Stress: None   Relationships    Social connections:     Talks on phone: None     Gets together: None     Attends Buddhist service: None     Active member of club or organization: None     Attends meetings of clubs or organizations: None     Relationship status: None    Intimate partner violence:     Fear of current or ex partner: None     Emotionally abused: None     Physically abused: None     Forced sexual activity: None   Other Topics Concern    None   Social History Narrative    Always uses seat belt     Caffeine use 2 cans/cups per day     Exercises regularly     Housewife or homemaker        Allergies: Allergies   Allergen Reactions    Cat Hair Extract     Dust Mite Extract     Iv Dye  [Iodinated Diagnostic Agents] Hives     Category: Allergy;     Pollen Extract      Seasonal allergies           Labs:  0   Lab Value Date/Time    HCT 39 0 12/05/2018 0703    HCT 36 3 10/31/2017 0722    HCT 39 9 08/15/2016 0825    HGB 12 9 12/05/2018 0703    HGB 12 4 10/31/2017 0722    HGB 13 3 08/15/2016 0825    INR 0 96 10/31/2017 0722    WBC 4 61 12/05/2018 0703    WBC 5 16 10/31/2017 0722    WBC 4 62 08/15/2016 0825    ESR 5 08/15/2016 0825    CRP <3 0 08/15/2016 0825       Meds:  No current facility-administered medications for this encounter  Blood Culture:   No results found for: BLOODCX    Wound Culture:   No results found for: WOUNDCULT    Ins and Outs:  No intake/output data recorded  Physical Exam  /76   Pulse 58   Resp 16   Ht 5' 3" (1 6 m)   Wt 53 1 kg (117 lb)   SpO2 99%   BMI 20 73 kg/m²   Gen: Alert and oriented to person, place, time  HEENT: EOMI, eyes clear, moist mucus membranes, hearing intact  Respiratory: Bilateral chest rise   No audible wheezing found  Cardiovascular: Regular Rate and Rhythm  Abdomen: soft nontender/nondistended  Ortho Exam:  Left hand crepitation in nodule left hand long finger with active locking but full fist formation  Neuro Exam:   The patient is neurovascularly intact in the median, ulnar, and radial nerve distribution  There is normal sensation and good capillary refill within the digits  2+ pulses              Lab Results: Reviewed  Imaging: Reviewed

## 2019-03-06 ENCOUNTER — OFFICE VISIT (OUTPATIENT)
Dept: OBGYN CLINIC | Facility: HOSPITAL | Age: 74
End: 2019-03-06

## 2019-03-06 VITALS
WEIGHT: 122 LBS | BODY MASS INDEX: 21.62 KG/M2 | HEART RATE: 75 BPM | HEIGHT: 63 IN | SYSTOLIC BLOOD PRESSURE: 126 MMHG | DIASTOLIC BLOOD PRESSURE: 74 MMHG

## 2019-03-06 DIAGNOSIS — Z98.890 S/P TRIGGER FINGER RELEASE: Primary | ICD-10-CM

## 2019-03-06 PROBLEM — M65.332 TRIGGER MIDDLE FINGER OF LEFT HAND: Status: RESOLVED | Noted: 2018-12-12 | Resolved: 2019-03-06

## 2019-03-06 PROCEDURE — 99024 POSTOP FOLLOW-UP VISIT: CPT | Performed by: ORTHOPAEDIC SURGERY

## 2019-03-06 NOTE — PROGRESS NOTES
Assessment:   S/P left long TFR 2/26/19   Possible reaction to Chlorhexidine Prep    Plan: Activities as tolerated for the finger and soreness should continue to improve  Advised that not likely for the local to cause this type of reaction but perhaps this is an unusual reaction to Chlorhexidine - will add this as a possible allergy in her chart    Follow Up:  PRN    CHIEF COMPLAINT:  Chief Complaint   Patient presents with    Left Middle Finger - Post-op         SUBJECTIVE:  Billie Murillo is a 75y o  year old female who presents for follow up S/P left long TFR 2/26/19  States she no longer has clicking of the finger, but still has some soreness  Patient's main concern is she woke up Thursday morning with swelling and fluid build up in her face with some redness in the cheeks and neck and now the arm by the antecubital fossa (which just started today)  She states she only took Aleve and Tylenol after surgery, which she states she has taken this before         PHYSICAL EXAMINATION:  General: well developed and well nourished, alert, oriented times 3 and appears comfortable  Psychiatric: Normal    MUSCULOSKELETAL EXAMINATION:  Incision: Clean, dry, intact  Range of Motion: As expected, no clicking  Neurovascular status: Neuro intact, good cap refill  Activity Restrictions: No restrictions  Done today: Sutures out      STUDIES REVIEWED:  No Studies to review      PROCEDURES PERFORMED:  Procedures  No Procedures performed today   Scribe Attestation    I,:   Cliff Reynolds PA-C am acting as a scribe while in the presence of the attending physician :        I,:   Mario Mercer MD personally performed the services described in this documentation    as scribed in my presence :

## 2019-05-02 DIAGNOSIS — Z12.39 SCREENING FOR MALIGNANT NEOPLASM OF BREAST: ICD-10-CM

## 2019-06-15 DIAGNOSIS — I10 BENIGN ESSENTIAL HYPERTENSION: ICD-10-CM

## 2019-06-17 RX ORDER — LISINOPRIL 10 MG/1
TABLET ORAL
Qty: 90 TABLET | Refills: 0 | Status: SHIPPED | OUTPATIENT
Start: 2019-06-17 | End: 2019-08-07 | Stop reason: SDUPTHER

## 2019-08-07 ENCOUNTER — OFFICE VISIT (OUTPATIENT)
Dept: FAMILY MEDICINE CLINIC | Facility: CLINIC | Age: 74
End: 2019-08-07
Payer: COMMERCIAL

## 2019-08-07 VITALS
SYSTOLIC BLOOD PRESSURE: 110 MMHG | DIASTOLIC BLOOD PRESSURE: 62 MMHG | TEMPERATURE: 98 F | RESPIRATION RATE: 17 BRPM | OXYGEN SATURATION: 98 % | BODY MASS INDEX: 20.19 KG/M2 | HEART RATE: 59 BPM | WEIGHT: 114 LBS

## 2019-08-07 DIAGNOSIS — M19.049 INFLAMMATION OF HAND JOINT, UNSPECIFIED LATERALITY: ICD-10-CM

## 2019-08-07 DIAGNOSIS — I10 BENIGN ESSENTIAL HYPERTENSION: Primary | ICD-10-CM

## 2019-08-07 DIAGNOSIS — E78.9 BORDERLINE HIGH CHOLESTEROL: ICD-10-CM

## 2019-08-07 DIAGNOSIS — M85.80 OSTEOPENIA, UNSPECIFIED LOCATION: ICD-10-CM

## 2019-08-07 DIAGNOSIS — M25.50 ARTHRALGIA OF MULTIPLE JOINTS: ICD-10-CM

## 2019-08-07 PROCEDURE — 1160F RVW MEDS BY RX/DR IN RCRD: CPT | Performed by: PHYSICIAN ASSISTANT

## 2019-08-07 PROCEDURE — 1036F TOBACCO NON-USER: CPT | Performed by: PHYSICIAN ASSISTANT

## 2019-08-07 PROCEDURE — 99214 OFFICE O/P EST MOD 30 MIN: CPT | Performed by: PHYSICIAN ASSISTANT

## 2019-08-07 PROCEDURE — 3074F SYST BP LT 130 MM HG: CPT | Performed by: PHYSICIAN ASSISTANT

## 2019-08-07 RX ORDER — MELOXICAM 7.5 MG/1
7.5 TABLET ORAL DAILY PRN
Qty: 60 TABLET | Refills: 1 | Status: SHIPPED | OUTPATIENT
Start: 2019-08-07 | End: 2019-11-17

## 2019-08-07 RX ORDER — LISINOPRIL 10 MG/1
10 TABLET ORAL DAILY
Qty: 90 TABLET | Refills: 1 | Status: SHIPPED | OUTPATIENT
Start: 2019-08-07 | End: 2020-02-18 | Stop reason: SDUPTHER

## 2019-08-07 NOTE — PROGRESS NOTES
Routine Follow-up    Delmy Cordova 75 y o  female   Date:  8/7/2019      Assessment and Plan:    Ran Hutchins was seen today for follow-up  Diagnoses and all orders for this visit:    Arthralgia of multiple joints  -     meloxicam (MOBIC) 7 5 mg tablet; Take 1 tablet (7 5 mg total) by mouth daily as needed for mild pain or moderate pain  -     C-reactive protein; Future  -     RF Screen w/ Reflex to Titer; Future  -     Vitamin D 25 hydroxy; Future    Inflammation of hand joint, unspecified laterality  -     meloxicam (MOBIC) 7 5 mg tablet; Take 1 tablet (7 5 mg total) by mouth daily as needed for mild pain or moderate pain  -     C-reactive protein; Future  -     RF Screen w/ Reflex to Titer; Future  -     CBC and differential; Future    Benign essential hypertension  -     lisinopril (ZESTRIL) 10 mg tablet; Take 1 tablet (10 mg total) by mouth daily  -     Comprehensive metabolic panel; Future  - stable manual readings in office, concern for faulty home BP cuff as reading were not consistent    Borderline high cholesterol  -     Lipid panel; Future    Osteopenia, unspecified location  -     DXA body comp analysis; Future  - continue vit d and calcium               HPI:  Chief Complaint   Patient presents with    Follow-up     HPI   Patient is a 74 yo female who presents for routine follow up  She was seen in office in January and BP was stable on lisinopril 10 mg  Her manual reading with small cuff today 110/62 but her home BP cuff was reading 138/78  She explains the other day it read 100/41 and fluctuates often  There is concern for faulty home BP cuff  Her only concern is feeling crabby because she is a lot of pain with arthralgias of hands and hips  The pain travels from her hands up her arms  She had labs in 2016 - negative RF and normal inflammatory markers  She unaware of any RA in the family  She takes advil with little relief  She takes 400 mg once or twice a week  She sometimes takes tylenol   She is weary about taking too much  She takes daily vit d 2000 units and MVI  She had osteopenia on dexa in 2017 and is due for repeat  ROS: Review of Systems   Constitutional: Negative  Respiratory: Negative  Cardiovascular: Negative  Musculoskeletal: Positive for arthralgias and joint swelling (enlarged knuckles)  Negative for gait problem  Skin: Negative  Neurological: Negative  No past medical history on file  Patient Active Problem List   Diagnosis    Abnormal EKG    Allergic rhinitis    Benign essential hypertension    Cataract    Intermittent palpitations    Lung density on x-ray    Osteopenia    Visual field scotoma of both eyes    S/P trigger finger release       Past Surgical History:   Procedure Laterality Date    APPENDECTOMY      CATARACT EXTRACTION      LAPAROSCOPIC APPENDECTOMY      incidental     IN INCISE FINGER TENDON SHEATH Left 2/26/2019    Procedure: LONG FINGER TRIGGER FINGER RELEASE;  Surgeon: Daniella Theodore MD;  Location[facility];  Service: Orthopedics    TONSILLECTOMY      TUBAL LIGATION         Social History     Socioeconomic History    Marital status: /Civil Union     Spouse name: None    Number of children: 3    Years of education: None    Highest education level: None   Occupational History    None   Social Needs    Financial resource strain: None    Food insecurity:     Worry: None     Inability: None    Transportation needs:     Medical: None     Non-medical: None   Tobacco Use    Smoking status: Former Smoker     Packs/day: 1 00    Smokeless tobacco: Never Used    Tobacco comment: age [age]    Substance and Sexual Activity    Alcohol use:  Yes     Alcohol/week: 1 0 - 4 0 standard drinks     Types: 1 - 4 Standard drinks or equivalent per week     Comment: 1-4 drinks per day     Drug use: No    Sexual activity: None   Lifestyle    Physical activity:     Days per week: None     Minutes per session: None    Stress: None Relationships    Social connections:     Talks on phone: None     Gets together: None     Attends Druze service: None     Active member of club or organization: None     Attends meetings of clubs or organizations: None     Relationship status: None    Intimate partner violence:     Fear of current or ex partner: None     Emotionally abused: None     Physically abused: None     Forced sexual activity: None   Other Topics Concern    None   Social History Narrative    Always uses seat belt     Caffeine use 2 cans/cups per day     Exercises regularly     Housewife or homemaker        Family History   Problem Relation Age of Onset    Lung cancer Mother     Hypertension Mother     Cancer Mother     Cerebral aneurysm Father         bleed    Alcohol abuse Father     Cirrhosis Father     Other Father         had fallen    Other Sister         unkownn cause     Pneumonia Brother         infancy of pneumonia    Other Sister         in MVA       Allergies   Allergen Reactions    Cat Hair Extract     Dust Mite Extract     Iv Dye  [Iodinated Diagnostic Agents] Hives     Category:  Allergy;     Other      Possible reaction to chlorhexadine, but this occurred days after and in other areas of the body than just the area prepped    Pollen Extract      Seasonal allergies         Current Outpatient Medications:     acetaminophen (TYLENOL 8 HOUR) 650 mg CR tablet, Take 1 tablet (650 mg total) by mouth every 8 (eight) hours, Disp: 15 tablet, Rfl: 0    Calcium-Vitamin D-Vitamin K (VIACTIV) 410-083-74 MG-UNT-MCG CHEW, Chew, Disp: , Rfl:     cetirizine (ZYRTEC ALLERGY) 10 mg tablet, Take by mouth, Disp: , Rfl:     diphenhydrAMINE (BENADRYL) 25 mg capsule, Take by mouth, Disp: , Rfl:     fluticasone (FLONASE) 50 mcg/act nasal spray, into each nostril, Disp: , Rfl:     lisinopril (ZESTRIL) 10 mg tablet, Take 1 tablet (10 mg total) by mouth daily, Disp: 90 tablet, Rfl: 1    Multiple Vitamins-Minerals (CENTRUM SILVER 50+WOMEN PO), Take by mouth, Disp: , Rfl:     meloxicam (MOBIC) 7 5 mg tablet, Take 1 tablet (7 5 mg total) by mouth daily as needed for mild pain or moderate pain, Disp: 60 tablet, Rfl: 1      Physical Exam:  /62   Pulse 59   Temp 98 °F (36 7 °C)   Resp 17   Wt 51 7 kg (114 lb)   SpO2 98%   BMI 20 19 kg/m²     Physical Exam   Constitutional: She is oriented to person, place, and time  She appears well-developed and well-nourished  No distress  Thin, pleasant female   HENT:   Head: Normocephalic and atraumatic  Mouth/Throat: Oropharynx is clear and moist    Eyes: Pupils are equal, round, and reactive to light  Conjunctivae are normal    Neck: Normal range of motion  Neck supple  Cardiovascular: Normal rate, regular rhythm and normal heart sounds  No murmur heard  Pulmonary/Chest: Effort normal  No respiratory distress  She has no wheezes  Musculoskeletal:   Enlarged MCP joints on both hands   Normal gait without assistive device   Neurological: She is alert and oriented to person, place, and time  No cranial nerve deficit  Skin: Skin is warm and dry  No rash noted  Psychiatric: She has a normal mood and affect   Her behavior is normal          Labs:  Lab Results   Component Value Date    WBC 4 61 12/05/2018    HGB 12 9 12/05/2018    HCT 39 0 12/05/2018     (H) 12/05/2018     12/05/2018     Lab Results   Component Value Date    K 4 4 12/05/2018     12/05/2018    CO2 27 12/05/2018    BUN 19 12/05/2018    CREATININE 1 08 12/05/2018    GLUF 102 (H) 12/05/2018    CALCIUM 9 6 12/05/2018    AST 19 12/05/2018    ALT 26 12/05/2018    ALKPHOS 95 12/05/2018    EGFR 51 12/05/2018

## 2019-10-24 ENCOUNTER — APPOINTMENT (OUTPATIENT)
Dept: LAB | Facility: CLINIC | Age: 74
End: 2019-10-24
Payer: COMMERCIAL

## 2019-10-24 ENCOUNTER — TELEPHONE (OUTPATIENT)
Dept: FAMILY MEDICINE CLINIC | Facility: CLINIC | Age: 74
End: 2019-10-24

## 2019-10-24 DIAGNOSIS — E78.9 BORDERLINE HIGH CHOLESTEROL: ICD-10-CM

## 2019-10-24 DIAGNOSIS — M85.80 OSTEOPENIA, UNSPECIFIED LOCATION: Primary | ICD-10-CM

## 2019-10-24 DIAGNOSIS — I10 BENIGN ESSENTIAL HYPERTENSION: ICD-10-CM

## 2019-10-24 DIAGNOSIS — M19.049 INFLAMMATION OF HAND JOINT, UNSPECIFIED LATERALITY: ICD-10-CM

## 2019-10-24 DIAGNOSIS — Z78.0 POSTMENOPAUSAL: ICD-10-CM

## 2019-10-24 DIAGNOSIS — M25.50 ARTHRALGIA OF MULTIPLE JOINTS: ICD-10-CM

## 2019-10-24 LAB
25(OH)D3 SERPL-MCNC: 45.4 NG/ML (ref 30–100)
ALBUMIN SERPL BCP-MCNC: 3.8 G/DL (ref 3.5–5)
ALP SERPL-CCNC: 97 U/L (ref 46–116)
ALT SERPL W P-5'-P-CCNC: 23 U/L (ref 12–78)
ANION GAP SERPL CALCULATED.3IONS-SCNC: 4 MMOL/L (ref 4–13)
AST SERPL W P-5'-P-CCNC: 16 U/L (ref 5–45)
BASOPHILS # BLD AUTO: 0.05 THOUSANDS/ΜL (ref 0–0.1)
BASOPHILS NFR BLD AUTO: 1 % (ref 0–1)
BILIRUB SERPL-MCNC: 0.42 MG/DL (ref 0.2–1)
BUN SERPL-MCNC: 20 MG/DL (ref 5–25)
CALCIUM SERPL-MCNC: 9.3 MG/DL (ref 8.3–10.1)
CHLORIDE SERPL-SCNC: 107 MMOL/L (ref 100–108)
CHOLEST SERPL-MCNC: 223 MG/DL (ref 50–200)
CO2 SERPL-SCNC: 26 MMOL/L (ref 21–32)
CREAT SERPL-MCNC: 1.26 MG/DL (ref 0.6–1.3)
CRP SERPL QL: 3.3 MG/L
EOSINOPHIL # BLD AUTO: 0.04 THOUSAND/ΜL (ref 0–0.61)
EOSINOPHIL NFR BLD AUTO: 1 % (ref 0–6)
ERYTHROCYTE [DISTWIDTH] IN BLOOD BY AUTOMATED COUNT: 11.7 % (ref 11.6–15.1)
GFR SERPL CREATININE-BSD FRML MDRD: 42 ML/MIN/1.73SQ M
GLUCOSE P FAST SERPL-MCNC: 99 MG/DL (ref 65–99)
HCT VFR BLD AUTO: 35.4 % (ref 34.8–46.1)
HDLC SERPL-MCNC: 92 MG/DL
HGB BLD-MCNC: 11.5 G/DL (ref 11.5–15.4)
IMM GRANULOCYTES # BLD AUTO: 0.02 THOUSAND/UL (ref 0–0.2)
IMM GRANULOCYTES NFR BLD AUTO: 0 % (ref 0–2)
LDLC SERPL CALC-MCNC: 118 MG/DL (ref 0–100)
LYMPHOCYTES # BLD AUTO: 1.67 THOUSANDS/ΜL (ref 0.6–4.47)
LYMPHOCYTES NFR BLD AUTO: 32 % (ref 14–44)
MCH RBC QN AUTO: 32.6 PG (ref 26.8–34.3)
MCHC RBC AUTO-ENTMCNC: 32.5 G/DL (ref 31.4–37.4)
MCV RBC AUTO: 100 FL (ref 82–98)
MONOCYTES # BLD AUTO: 0.38 THOUSAND/ΜL (ref 0.17–1.22)
MONOCYTES NFR BLD AUTO: 7 % (ref 4–12)
NEUTROPHILS # BLD AUTO: 3.06 THOUSANDS/ΜL (ref 1.85–7.62)
NEUTS SEG NFR BLD AUTO: 59 % (ref 43–75)
NONHDLC SERPL-MCNC: 131 MG/DL
NRBC BLD AUTO-RTO: 0 /100 WBCS
PLATELET # BLD AUTO: 345 THOUSANDS/UL (ref 149–390)
PMV BLD AUTO: 10.7 FL (ref 8.9–12.7)
POTASSIUM SERPL-SCNC: 5.1 MMOL/L (ref 3.5–5.3)
PROT SERPL-MCNC: 7.2 G/DL (ref 6.4–8.2)
RBC # BLD AUTO: 3.53 MILLION/UL (ref 3.81–5.12)
SODIUM SERPL-SCNC: 137 MMOL/L (ref 136–145)
TRIGL SERPL-MCNC: 67 MG/DL
WBC # BLD AUTO: 5.22 THOUSAND/UL (ref 4.31–10.16)

## 2019-10-24 PROCEDURE — 86431 RHEUMATOID FACTOR QUANT: CPT

## 2019-10-24 PROCEDURE — 85025 COMPLETE CBC W/AUTO DIFF WBC: CPT

## 2019-10-24 PROCEDURE — 86430 RHEUMATOID FACTOR TEST QUAL: CPT

## 2019-10-24 PROCEDURE — 36415 COLL VENOUS BLD VENIPUNCTURE: CPT

## 2019-10-24 PROCEDURE — 80053 COMPREHEN METABOLIC PANEL: CPT

## 2019-10-24 PROCEDURE — 82306 VITAMIN D 25 HYDROXY: CPT

## 2019-10-24 PROCEDURE — 80061 LIPID PANEL: CPT

## 2019-10-24 PROCEDURE — 86140 C-REACTIVE PROTEIN: CPT

## 2019-10-25 DIAGNOSIS — R76.8 RHEUMATOID FACTOR POSITIVE: Primary | ICD-10-CM

## 2019-10-25 DIAGNOSIS — R79.82 ELEVATED C-REACTIVE PROTEIN (CRP): ICD-10-CM

## 2019-10-25 DIAGNOSIS — M19.049 INFLAMMATION OF HAND JOINT, UNSPECIFIED LATERALITY: ICD-10-CM

## 2019-10-25 DIAGNOSIS — M85.80 OSTEOPENIA, UNSPECIFIED LOCATION: ICD-10-CM

## 2019-10-25 DIAGNOSIS — Z78.0 POSTMENOPAUSAL: ICD-10-CM

## 2019-10-25 LAB
CRYOGLOB RF SER-ACNC: ABNORMAL [IU]/ML
RHEUMATOID FACT SER QL LA: POSITIVE

## 2019-11-11 ENCOUNTER — OFFICE VISIT (OUTPATIENT)
Dept: FAMILY MEDICINE CLINIC | Facility: CLINIC | Age: 74
End: 2019-11-11
Payer: COMMERCIAL

## 2019-11-11 DIAGNOSIS — M85.80 OSTEOPENIA, UNSPECIFIED LOCATION: ICD-10-CM

## 2019-11-11 DIAGNOSIS — Z23 NEED FOR PNEUMOCOCCAL VACCINATION: Primary | ICD-10-CM

## 2019-11-11 DIAGNOSIS — M05.742 RHEUMATOID ARTHRITIS INVOLVING BOTH HANDS WITH POSITIVE RHEUMATOID FACTOR (HCC): ICD-10-CM

## 2019-11-11 DIAGNOSIS — Z23 NEED FOR INFLUENZA VACCINATION: ICD-10-CM

## 2019-11-11 DIAGNOSIS — M05.741 RHEUMATOID ARTHRITIS INVOLVING BOTH HANDS WITH POSITIVE RHEUMATOID FACTOR (HCC): ICD-10-CM

## 2019-11-11 PROCEDURE — 1036F TOBACCO NON-USER: CPT | Performed by: PHYSICIAN ASSISTANT

## 2019-11-11 PROCEDURE — 90662 IIV NO PRSV INCREASED AG IM: CPT

## 2019-11-11 PROCEDURE — 90471 IMMUNIZATION ADMIN: CPT

## 2019-11-11 PROCEDURE — 90472 IMMUNIZATION ADMIN EACH ADD: CPT

## 2019-11-11 PROCEDURE — 90732 PPSV23 VACC 2 YRS+ SUBQ/IM: CPT

## 2019-11-11 PROCEDURE — 1160F RVW MEDS BY RX/DR IN RCRD: CPT

## 2019-11-11 PROCEDURE — 3288F FALL RISK ASSESSMENT DOCD: CPT | Performed by: PHYSICIAN ASSISTANT

## 2019-11-11 PROCEDURE — 99213 OFFICE O/P EST LOW 20 MIN: CPT | Performed by: PHYSICIAN ASSISTANT

## 2019-11-11 PROCEDURE — 1100F PTFALLS ASSESS-DOCD GE2>/YR: CPT | Performed by: PHYSICIAN ASSISTANT

## 2019-11-11 RX ORDER — CHOLECALCIFEROL (VITAMIN D3) 125 MCG
2000 CAPSULE ORAL DAILY
COMMUNITY

## 2019-11-11 NOTE — PROGRESS NOTES
Routine Follow-up    Margarito Nieves 75 y o  female   Date:  11/11/2019    Assessment and Plan:    Bari Lemus was seen today for follow-up  Diagnoses and all orders for this visit:    Need for pneumococcal vaccination  -     PNEUMOCOCCAL POLYSACCHARIDE VACCINE 23-VALENT =>1YO SQ IM    Need for influenza vaccination  -     influenza vaccine, 2941-1650, high-dose, PF 0 5 mL (FLUZONE HIGH-DOSE)    Osteopenia, unspecified location  - weight bearing exercisies, vit d and calcium  - continue dexa every 2 years    Rheumatoid arthritis involving both hands with positive rheumatoid factor ([facility] [address] )  - encourage follow up with rheumatology  - did not take mobic due to s/e profile      Falls Plan of Care: balance, strength, and gait training instructions were provided  She was instructed to avoid tripping hazards in home, especially in setting of osteopenia  HPI:  Chief Complaint   Patient presents with    Follow-up     Review DEXA results      HPI  Patient is a 74 yo female who presents to review labs and dexa  She was sent for labs due to complaint of arthralgias of multiple joints and hands  She did have + RF  She was refferred to Rheumatology  She did not take mobic due to reading s/e profiile, she would rather not take anything  She has repeat dexa that showed osteopenia and slightly worsening, she continues vit d and calcium supplements otc  She did fall once but tripped over a rug, no injury  She denies problem with balance, dizziness, or other falls  She is not worried about falling  She takes benefiber  ROS: Review of Systems   Constitutional: Negative  Respiratory: Negative  Cardiovascular: Negative  Genitourinary: Negative  Musculoskeletal: Positive for arthralgias  Negative for gait problem and myalgias  Skin: Negative  Neurological: Negative  History reviewed  No pertinent past medical history    Patient Active Problem List   Diagnosis    Abnormal EKG    Allergic rhinitis    Benign essential hypertension    Cataract    Intermittent palpitations    Lung density on x-ray    Osteopenia    Visual field scotoma of both eyes    S/P trigger finger release    Rheumatoid arthritis involving both hands with positive rheumatoid factor (HCC)       Past Surgical History:   Procedure Laterality Date    APPENDECTOMY      CATARACT EXTRACTION      LAPAROSCOPIC APPENDECTOMY      incidental     ID INCISE FINGER TENDON SHEATH Left 2/26/2019    Procedure: LONG FINGER TRIGGER FINGER RELEASE;  Surgeon: Morgan Vaughan MD;  [facility];  Service: Orthopedics    TONSILLECTOMY      TUBAL LIGATION         Social History     Socioeconomic History    Marital status: /Civil Union     Spouse name: None    Number of children: 3    Years of education: None    Highest education level: None   Occupational History    None   Social Needs    Financial resource strain: None    Food insecurity:     Worry: None     Inability: None    Transportation needs:     Medical: No     Non-medical: No   Tobacco Use    Smoking status: Former Smoker     Packs/day: 1 00    Smokeless tobacco: Never Used    Tobacco comment: age [age]    Substance and Sexual Activity    Alcohol use:  Yes     Alcohol/week: 1 0 - 4 0 standard drinks     Types: 1 - 4 Standard drinks or equivalent per week     Comment: 1-4 drinks per day     Drug use: No    Sexual activity: Not Currently   Lifestyle    Physical activity:     Days per week: 0 days     Minutes per session: 0 min    Stress: None   Relationships    Social connections:     Talks on phone: None     Gets together: None     Attends Pentecostal service: None     Active member of club or organization: None     Attends meetings of clubs or organizations: None     Relationship status: None    Intimate partner violence:     Fear of current or ex partner: None     Emotionally abused: None     Physically abused: None     Forced sexual activity: None   Other Topics Concern    None   Social History Narrative    Always uses seat belt     Caffeine use 2 cans/cups per day     Exercises regularly     Housewife or homemaker        Family History   Problem Relation Age of Onset    Lung cancer Mother     Hypertension Mother     Cancer Mother     Cerebral aneurysm Father         bleed    Alcohol abuse Father     Cirrhosis Father     Other Father         had fallen   Valaria Reil Other Sister         unkownn cause     Pneumonia Brother         infancy of pneumonia    Other Sister         in MVA       Allergies   Allergen Reactions    Cat Hair Extract     Dust Mite Extract     Iv Dye  [Iodinated Diagnostic Agents] Hives     Category: Allergy;     Other      Possible reaction to chlorhexadine, but this occurred days after and in other areas of the body than just the area prepped    Pollen Extract      Seasonal allergies         Current Outpatient Medications:     cetirizine (ZYRTEC ALLERGY) 10 mg tablet, Take by mouth, Disp: , Rfl:     Cholecalciferol (VITAMIN D3) 50 MCG (2000 UT) TABS, Take 2,000 Units by mouth daily, Disp: , Rfl:     diphenhydrAMINE (BENADRYL) 25 mg capsule, Take by mouth, Disp: , Rfl:     fluticasone (FLONASE) 50 mcg/act nasal spray, into each nostril, Disp: , Rfl:     lisinopril (ZESTRIL) 10 mg tablet, Take 1 tablet (10 mg total) by mouth daily, Disp: 90 tablet, Rfl: 1    Multiple Vitamins-Minerals (CENTRUM SILVER 50+WOMEN PO), Take by mouth, Disp: , Rfl:       Physical Exam:  /68 (BP Location: Left arm, Patient Position: Sitting, Cuff Size: Adult) Comment: recheck  Pulse 55   Temp 98 2 °F (36 8 °C) (Tympanic)   Resp 17   Ht 5' 3" (1 6 m)   Wt 53 5 kg (118 lb)   SpO2 97%   BMI 20 90 kg/m²     Physical Exam   Constitutional: She is oriented to person, place, and time  She appears well-developed and well-nourished  No distress  HENT:   Head: Normocephalic and atraumatic     Mouth/Throat: Oropharynx is clear and moist    Eyes: Pupils are equal, round, and reactive to light  Conjunctivae are normal    Neck: Normal range of motion  Neck supple  Cardiovascular: Normal rate, regular rhythm and normal heart sounds  Pulmonary/Chest: Effort normal and breath sounds normal  No respiratory distress  She has no wheezes  Musculoskeletal: She exhibits no deformity  Enlargement of multiple joints in hands   Walks without assistance   Neurological: She is alert and oriented to person, place, and time  Skin: Skin is warm and dry  No rash noted  Psychiatric: She has a normal mood and affect   Her behavior is normal          Labs:  Lab Results   Component Value Date    WBC 5 22 10/24/2019    HGB 11 5 10/24/2019    HCT 35 4 10/24/2019     (H) 10/24/2019     10/24/2019     Lab Results   Component Value Date    K 5 1 10/24/2019     10/24/2019    CO2 26 10/24/2019    BUN 20 10/24/2019    CREATININE 1 26 10/24/2019    GLUF 99 10/24/2019    CALCIUM 9 3 10/24/2019    AST 16 10/24/2019    ALT 23 10/24/2019    ALKPHOS 97 10/24/2019    EGFR 42 10/24/2019

## 2019-11-11 NOTE — PATIENT INSTRUCTIONS
Start taking calcium citrate supplement daily to help with bone strength  You can take miralax daily as needed for constipation  Osteopenia   AMBULATORY CARE:   Osteopenia  is a condition that causes bone loss and low bone mineral density (BMD)  Low BMD can weaken your bones and increase your risk for fractures  Osteopenia does not cause signs or symptoms  You may not know you have it until you break a bone  Osteopenia must be managed or treated so it does not worsen and become osteoporosis  Osteoporosis is a serious condition that causes bones to become weak, brittle, and easily fractured  Treatment for osteopenia  depends on your risk for fracture  If your risk is low, you may only need to make exercise, nutrition, and other lifestyle changes to manage osteopenia  The changes can help prevent more bone mineral loss and reduce your risk for fractures  If your risk is high, you may be given medicines to help strengthen your bones, prevent bone breakdown, or increase bone formation  Manage osteopenia:   · Exercise as directed  Do weight-bearing exercises, such as brisk walking, dancing, or yoga  Weight-bearing exercises help build or maintain bone  Exercises such as swimming and bike riding are non-weight-bearing and will not build or maintain bone  Weightlifting also helps strengthen bones and build muscle  Extra muscle can help protect your bones  Your healthcare provider may recommend weightlifting 3 times per week as part of your exercise routine  · Increase calcium and vitamin D as directed  Calcium and vitamin D work together to help build bone  The body deposits calcium into the bones until about age 27  You will then need to get enough calcium and vitamin D to maintain what your bones are storing  Your healthcare provider may tell you to eat more dairy products, such as milk and cheese, for calcium  Spinach, salmon, and dried beans are also good sources of calcium   Cereal, bread, and orange juice may be fortified with vitamin D  You also get vitamin D from exposure to sunlight  Your healthcare provider may also suggest a calcium or vitamin D supplement  Do not take supplements unless directed  · Limit or do not drink alcohol as directed  Alcohol can take calcium from your bones and increase your risk for fractures  Ask your healthcare provider if it is safe for you to drink alcohol  Women should limit alcohol to 1 drink per day  Men should limit alcohol to 2 drinks per day  A drink of alcohol is 12 ounces of beer, 5 ounces of wine, or 1½ ounces of liquor  · Do not smoke  Nicotine can damage blood vessels and make it more difficult to manage your osteopenia  Smoking also affects bone density and increases your risk for bone fractures  Do not use e-cigarettes or smokeless tobacco in place of cigarettes or to help you quit  They still contain nicotine  Ask your healthcare provider for information if you currently smoke and need help quitting  Ask your healthcare provider for information if you need help quitting  · Prevent falls  Decrease your risk for falling by removing items from the floor and removing loose carpets  Turn the lights on where you will be walking  Follow up with your healthcare provider as directed:  Write down your questions so you remember to ask them during your visits  © 2017 [facility] Information is for End User's use only and may not be sold, redistributed or otherwise used for commercial purposes  All illustrations and images included in CareNotes® are the copyrighted property of [employer]  or [employer]  The above information is an  only  It is not intended as medical advice for individual conditions or treatments  Talk to your doctor, nurse or pharmacist before following any medical regimen to see if it is safe and effective for you      Constipation   WHAT YOU NEED TO KNOW:   Constipation is when you have hard, dry bowel movements, or you go longer than usual between bowel movements  DISCHARGE INSTRUCTIONS:   Return to the emergency department if:   · You have blood in your bowel movements  · You have a fever and abdominal pain with the constipation  Contact your healthcare provider if:   · Your constipation gets worse  · You start to vomit  · You have questions or concerns about your condition or care  Medicines:   · Medicine or a fiber supplement  may help make your bowel movement softer  A laxative may help relax and loosen your intestines to help you have a bowel movement  You may also be given medicine to increase fluid in your intestines  The fluid may help move bowel movements through your intestines  · Take your medicine as directed  Contact your healthcare provider if you think your medicine is not helping or if you have side effects  Tell him of her if you are allergic to any medicine  Keep a list of the medicines, vitamins, and herbs you take  Include the amounts, and when and why you take them  Bring the list or the pill bottles to follow-up visits  Carry your medicine list with you in case of an emergency  Manage your constipation:   · Drink liquids as directed  You may need to drink extra liquids to help soften and move your bowels  Ask how much liquid to drink each day and which liquids are best for you  · Eat high-fiber foods  This may help decrease constipation by adding bulk to your bowel movements  High-fiber foods include fruit, vegetables, whole-grain breads and cereals, and beans  Your healthcare provider or dietitian can help you create a high-fiber meal plan  · Exercise regularly  Regular physical activity can help stimulate your intestines  Ask which exercises are best for you  · Schedule a time each day to have a bowel movement  This may help train your body to have regular bowel movements   Bend forward while you are on the toilet to help move the bowel movement out  Sit on the toilet for at least 10 minutes, even if you do not have a bowel movement  Follow up with your healthcare provider as directed:  Write down your questions so you remember to ask them during your visits  © 2017 [employer] [employer] Information is for End User's use only and may not be sold, redistributed or otherwise used for commercial purposes  All illustrations and images included in CareNotes® are the copyrighted property of A D A M [employer]  or [employer]  The above information is an  only  It is not intended as medical advice for individual conditions or treatments  Talk to your doctor, nurse or pharmacist before following any medical regimen to see if it is safe and effective for you

## 2019-11-12 ENCOUNTER — TELEPHONE (OUTPATIENT)
Dept: FAMILY MEDICINE CLINIC | Facility: CLINIC | Age: 74
End: 2019-11-12

## 2019-11-12 NOTE — TELEPHONE ENCOUNTER
Spoke to patient  Patient is having redness and swelling of right arm  Is questioning which immunization was given and if this a common reaction  Did inform patient that she received the Pneumovax 23  Did confirm with patient that redness and swelling can happen  Patient did take Advil and Benadryl  Will keep an eye on the arm and will call back with any worsening symptoms

## 2019-11-12 NOTE — TELEPHONE ENCOUNTER
called with concerns what shot was given in the R arm- he believes she may be having an allergic reaction to it

## 2019-11-13 ENCOUNTER — TELEPHONE (OUTPATIENT)
Dept: FAMILY MEDICINE CLINIC | Facility: CLINIC | Age: 74
End: 2019-11-13

## 2019-11-13 NOTE — TELEPHONE ENCOUNTER
Hadley Cisneros,    Are you able to contact our  at [facility] to find out the below info regarding if we can change the diagnoses?   She did use a [facility]      Thanks

## 2019-11-13 NOTE — TELEPHONE ENCOUNTER
There were multiple orders placed that are linked to complaints or certain diagnoses  Can we contact billing because I wouldn't know which lab is being billed this way

## 2019-11-13 NOTE — TELEPHONE ENCOUNTER
Patient had labs done on 10/24/2019 and she received a bill  It needs to be as preventative, it was billed under diagnostic and her bill total is $59 93 and she's refusing to pay it  Can it be re-coded or stands as it?

## 2019-11-14 NOTE — TELEPHONE ENCOUNTER
I was on speaker phone with patient and  present, she is disputing a bill for Radiology  I explained that some of this may have been applied to a detuctable   stated she's already met that for the year  I looked in POS Payment and it is only showing the cost for the lab that she owes  Patient got a bill for $5 79 and it stating she should not owe this for Radiology

## 2019-11-14 NOTE — TELEPHONE ENCOUNTER
I spoke to Laurel regarding the labs that were completed on 10/24/19  Laurel stated that all of the codes are appropriate and it wouldn't be coded as preventative since she was doing a work up for Rheumatoid Arthritis

## 2019-11-17 VITALS
OXYGEN SATURATION: 97 % | HEIGHT: 63 IN | SYSTOLIC BLOOD PRESSURE: 132 MMHG | TEMPERATURE: 98.2 F | WEIGHT: 118 LBS | BODY MASS INDEX: 20.91 KG/M2 | RESPIRATION RATE: 17 BRPM | HEART RATE: 55 BPM | DIASTOLIC BLOOD PRESSURE: 68 MMHG

## 2019-11-17 PROBLEM — M05.742 RHEUMATOID ARTHRITIS INVOLVING BOTH HANDS WITH POSITIVE RHEUMATOID FACTOR (HCC): Status: ACTIVE | Noted: 2019-11-17

## 2019-11-17 PROBLEM — M05.741 RHEUMATOID ARTHRITIS INVOLVING BOTH HANDS WITH POSITIVE RHEUMATOID FACTOR (HCC): Status: ACTIVE | Noted: 2019-11-17

## 2020-02-18 ENCOUNTER — TRANSITIONAL CARE MANAGEMENT (OUTPATIENT)
Dept: FAMILY MEDICINE CLINIC | Facility: CLINIC | Age: 75
End: 2020-02-18

## 2020-02-18 ENCOUNTER — TELEPHONE (OUTPATIENT)
Dept: FAMILY MEDICINE CLINIC | Facility: CLINIC | Age: 75
End: 2020-02-18

## 2020-02-18 DIAGNOSIS — I10 BENIGN ESSENTIAL HYPERTENSION: ICD-10-CM

## 2020-02-18 RX ORDER — LISINOPRIL 10 MG/1
10 TABLET ORAL DAILY
Qty: 90 TABLET | Refills: 1 | Status: SHIPPED | OUTPATIENT
Start: 2020-02-18 | End: 2020-09-09 | Stop reason: SDUPTHER

## 2020-02-18 NOTE — TELEPHONE ENCOUNTER
Insurance company called on a three way with patient  Patient catrachospike on the phone stating she is in so much pain and wanted pain pills prior to her appt  She said when she was seen in at  she was asked if she wanted pills and she declined, now she is stating today everything hurts her- neck, back and shoulders  I advised her to go back to the ER she was at or [facility] for medication  She said no she did not want to do that  She asked if she could take her husbands percocet  I advised her not to do that and we went over it again that she is pending a Friday appt and she is on the wait list and we would call her if something sooner comes up

## 2020-02-18 NOTE — TELEPHONE ENCOUNTER
Called pt for TCM and while scheduling she needed a refill of her Lisinopril to be sent to [facility]

## 2020-02-21 ENCOUNTER — APPOINTMENT (OUTPATIENT)
Dept: RADIOLOGY | Facility: CLINIC | Age: 75
End: 2020-02-21
Payer: COMMERCIAL

## 2020-02-21 ENCOUNTER — OFFICE VISIT (OUTPATIENT)
Dept: FAMILY MEDICINE CLINIC | Facility: CLINIC | Age: 75
End: 2020-02-21
Payer: COMMERCIAL

## 2020-02-21 VITALS
RESPIRATION RATE: 17 BRPM | BODY MASS INDEX: 21.97 KG/M2 | HEART RATE: 57 BPM | WEIGHT: 124 LBS | SYSTOLIC BLOOD PRESSURE: 110 MMHG | DIASTOLIC BLOOD PRESSURE: 70 MMHG | OXYGEN SATURATION: 93 % | TEMPERATURE: 98.1 F

## 2020-02-21 DIAGNOSIS — R52 UNCONTROLLED PAIN: ICD-10-CM

## 2020-02-21 DIAGNOSIS — R77.8 ELEVATED TROPONIN: ICD-10-CM

## 2020-02-21 DIAGNOSIS — S42.91XD CLOSED FRACTURE OF RIGHT SHOULDER WITH ROUTINE HEALING, SUBSEQUENT ENCOUNTER: ICD-10-CM

## 2020-02-21 DIAGNOSIS — S42.91XD CLOSED FRACTURE OF RIGHT SHOULDER WITH ROUTINE HEALING, SUBSEQUENT ENCOUNTER: Primary | ICD-10-CM

## 2020-02-21 DIAGNOSIS — Z09 HOSPITAL DISCHARGE FOLLOW-UP: ICD-10-CM

## 2020-02-21 PROBLEM — I21.4 NSTEMI (NON-ST ELEVATED MYOCARDIAL INFARCTION) (HCC): Status: ACTIVE | Noted: 2020-02-16

## 2020-02-21 PROBLEM — S42.91XA SHOULDER FRACTURE, RIGHT: Status: ACTIVE | Noted: 2020-02-15

## 2020-02-21 PROCEDURE — 99496 TRANSJ CARE MGMT HIGH F2F 7D: CPT | Performed by: PHYSICIAN ASSISTANT

## 2020-02-21 PROCEDURE — 73030 X-RAY EXAM OF SHOULDER: CPT

## 2020-02-21 RX ORDER — SENNA AND DOCUSATE SODIUM 50; 8.6 MG/1; MG/1
1 TABLET, FILM COATED ORAL 2 TIMES DAILY
COMMUNITY
Start: 2020-02-17 | End: 2021-09-14

## 2020-02-21 RX ORDER — ASPIRIN 81 MG/1
81 TABLET, CHEWABLE ORAL DAILY
COMMUNITY
Start: 2020-02-17 | End: 2021-02-16

## 2020-02-21 RX ORDER — OXYCODONE HYDROCHLORIDE AND ACETAMINOPHEN 5; 325 MG/1; MG/1
1 TABLET ORAL EVERY 8 HOURS PRN
Qty: 10 TABLET | Refills: 0 | Status: SHIPPED | OUTPATIENT
Start: 2020-02-21 | End: 2021-09-14

## 2020-02-21 NOTE — PROGRESS NOTES
Transition of Care  Follow-up After Hospitalization    Margarito Nieves 75 y o  female   Date:  2/21/2020    TCM Call (since 1/21/2020)     Date and time call was made  2/18/2020  8:34 AM    Hospital care reviewed  Records reviewed    Patient was hospitialized at  [facility]    Date of Admission  02/15/20    Date of discharge  02/17/20    Diagnosis  NSTEMI/Shoulder fracture    Disposition  Home    Were the patients medications reviewed and updated  No    Current Symptoms  Arm pain - right side    Right side arm pain severity  Severe    Arm pain, right side, onset  Ongoing      TCM Call (since 1/21/2020)     Post hospital issues  None    Should patient be enrolled in anticoag monitoring? No    Scheduled for follow up? Yes    Did you obtain your prescribed medications  Yes    Do you need help managing your prescriptions or medications  No    Is transportation to your appointment needed  No    I have advised the patient to call PCP with any new or worsening symptoms  RAHAT Santizo [facility] records were reviewed  Medications upon discharge reviewed/updated  Discharge Disposition: home  Follow up visits with other specialists: orthopedics      Assessment and Plan:    Audi Celestin was seen today for transition of care management, neck pain and arm pain  Diagnoses and all orders for this visit:    Closed fracture of right shoulder with routine healing, subsequent encounter  -     Ambulatory referral to Orthopedic Surgery; Future  -     oxyCODONE-acetaminophen (PERCOCET) 5-325 mg per tablet;  Take 1 tablet by mouth every 8 (eight) hours as needed for severe painMax Daily Amount: 3 tablets  -     XR shoulder 2+ vw right; Future  - will give a 1 time script for percocet, do not take any other prescriptions than your own  - referral for ortho for next week  - may continue tylenol as well, do not exceed 3000 mg in 1 day  - I did not have my phone on me today to be able to complete 2 step verification to send script electronically, we tried to call it in to [address] [place] [address] and they preferred hard copy      Elevated troponin  - 1st troponin slightly elevated, 2nd and 3rd normal  - no chest pain  - normal EKG and echo  - felt demand ischemia  - consider stress test once healed from above     Uncontrolled pain  - see plan above          HPI:  HPI   Patient is a 76 yo female who presents for hospital discharge follow up from 2/15-/217 at [facility] due to fall with R shoulder pain and was found to have R shoulder fracture  She was evaluated by orthopedics - felt to be managed nonoperatively with immobilizer  She was recommended to have repeat xray 1 week and follow up with ortho  Patient had mild elevated in troponin felt related to be demand ischemia, second and third troponin were normal and no EKG changes  PT and OT evaluated patient in hospital  She was recommend to have stress test in future  She did have an echo which showed EF 60%  She is having a lot of pain in R shoulder and through her neck and back  She refused her percocet from discharge  But she is regretting it now  Her  had a left over script for percocet and she took two halves of a tablet to sleep and this helped  She was advised to never take a script of someone elses  She can have a short term supply of her own script  She did not arrange orthopedic appt  She and  states that they are unaware of any of there results  She expresses that she was frustrated that she even had to stay for observation  She is not having any chest pain or trouble breathing  She has unremarkable trauma scans  ROS: Review of Systems   Constitutional: Negative for appetite change, diaphoresis and fever  Respiratory: Negative  Cardiovascular: Negative  Musculoskeletal: Positive for arthralgias, back pain, joint swelling, myalgias and neck pain  Negative for gait problem  Neurological: Negative  No past medical history on file      Past Surgical History: Procedure Laterality Date    APPENDECTOMY      CATARACT EXTRACTION      LAPAROSCOPIC APPENDECTOMY      incidental     ME INCISE FINGER TENDON SHEATH Left 2/26/2019    Procedure: LONG FINGER TRIGGER FINGER RELEASE;  Surgeon: Gary Lundberg MD;  [facility]: [facility];  Service: Orthopedics    TONSILLECTOMY      TUBAL LIGATION         Social History     Socioeconomic History    Marital status: /Civil Union     Spouse name: None    Number of children: 3    Years of education: None    Highest education level: None   Occupational History    None   Social Needs    Financial resource strain: None    Food insecurity:     Worry: None     Inability: None    Transportation needs:     Medical: No     Non-medical: No   Tobacco Use    Smoking status: Former Smoker     Packs/day: 1 00    Smokeless tobacco: Never Used    Tobacco comment: age [age]    Substance and Sexual Activity    Alcohol use:  Yes     Alcohol/week: 1 0 - 4 0 standard drinks     Types: 1 - 4 Standard drinks or equivalent per week     Comment: 1-4 drinks per day     Drug use: No    Sexual activity: Not Currently   Lifestyle    Physical activity:     Days per week: 0 days     Minutes per session: 0 min    Stress: None   Relationships    Social connections:     Talks on phone: None     Gets together: None     Attends Buddhism service: None     Active member of club or organization: None     Attends meetings of clubs or organizations: None     Relationship status: None    Intimate partner violence:     Fear of current or ex partner: None     Emotionally abused: None     Physically abused: None     Forced sexual activity: None   Other Topics Concern    None   Social History Narrative    Always uses seat belt     Caffeine use 2 cans/cups per day     Exercises regularly     Housewife or homemaker        Family History   Problem Relation Age of Onset    Lung cancer Mother     Hypertension Mother     Cancer Mother     Cerebral aneurysm Father         bleed    Alcohol abuse Father     Cirrhosis Father     Other Father         had fallen   Beatriz Credit Other Sister         unkownn cause     Pneumonia Brother         infancy of pneumonia    Other Sister         in MVA       Allergies   Allergen Reactions    Cat Hair Extract     Dust Mite Extract     Iv Dye  [Iodinated Diagnostic Agents] Hives     Category: Allergy;     Other      Possible reaction to chlorhexadine, but this occurred days after and in other areas of the body than just the area prepped    Pollen Extract      Seasonal allergies         Current Outpatient Medications:     aspirin 81 mg chewable tablet, Chew 81 mg daily, Disp: , Rfl:     cetirizine (ZYRTEC ALLERGY) 10 mg tablet, Take by mouth, Disp: , Rfl:     Cholecalciferol (VITAMIN D3) 50 MCG (2000 UT) TABS, Take 2,000 Units by mouth daily, Disp: , Rfl:     diphenhydrAMINE (BENADRYL) 25 mg capsule, Take by mouth, Disp: , Rfl:     fluticasone (FLONASE) 50 mcg/act nasal spray, into each nostril, Disp: , Rfl:     lisinopril (ZESTRIL) 10 mg tablet, Take 1 tablet (10 mg total) by mouth daily, Disp: 90 tablet, Rfl: 1    Multiple Vitamins-Minerals (CENTRUM SILVER 50+WOMEN PO), Take by mouth, Disp: , Rfl:     oxyCODONE-acetaminophen (PERCOCET) 5-325 mg per tablet, Take 1 tablet by mouth every 8 (eight) hours as needed for severe painMax Daily Amount: 3 tablets, Disp: 10 tablet, Rfl: 0    senna-docusate sodium (SENOKOT-S) 8 6-50 mg per tablet, Take 1 tablet by mouth 2 (two) times a day, Disp: , Rfl:       Physical Exam:  /70   Pulse 57   Temp 98 1 °F (36 7 °C)   Resp 17   Wt 56 2 kg (124 lb)   SpO2 93%   BMI 21 97 kg/m²     Physical Exam   Constitutional: She is oriented to person, place, and time  She appears well-developed and well-nourished  No distress  She sits in room with sunglasses on   HENT:   Head: Normocephalic and atraumatic     Mouth/Throat: Oropharynx is clear and moist    Neck: Normal range of motion  Neck supple  Cardiovascular: Normal rate, regular rhythm and normal heart sounds  Pulmonary/Chest: Effort normal and breath sounds normal  No respiratory distress  She has no wheezes  Musculoskeletal: She exhibits deformity (R shoulder/arm in sling )  Muscle tension along b/l cervical region  Able to ambulate without difficulty      Neurological: She is alert and oriented to person, place, and time  No cranial nerve deficit  Skin: Skin is warm and dry  No rash noted  Psychiatric: She has a normal mood and affect  Labs:  Lab Results   Component Value Date    WBC 5 22 10/24/2019    HGB 11 5 10/24/2019    HCT 35 4 10/24/2019     (H) 10/24/2019     10/24/2019     Lab Results   Component Value Date    K 5 1 10/24/2019     10/24/2019    CO2 26 10/24/2019    BUN 20 10/24/2019    CREATININE 1 26 10/24/2019    GLUF 99 10/24/2019    CALCIUM 9 3 10/24/2019    AST 16 10/24/2019    ALT 23 10/24/2019    ALKPHOS 97 10/24/2019    EGFR 42 10/24/2019         UDAY SAWANT   Age: 75  Data as of: 02/21/2020    No prescription data is available from your state  for this patient    Demographics    Linked Records  Search Criteria            Summary    Summary  Total Prescriptions:    0    Total Prescribers:    0    Total Pharmacies:    0    Narcotics* (excluding Buprenorphine)  Current Qty:   0   Current MME/day:   0 00   30 Day Avg MME/day:   0 00   Buprenorphine*  Current Qty:   0   Current mg/day:   0 00   30 Day Avg mg/day:   0 00   Prescriptions    *Highlighted drugs could not be included in score calculations   Prescriptions  Total Prescriptions: 0    Total Private Pay: 0    Fill Date ID   Written Drug Qty Days Prescriber Rx # Pharmacy Refill   Daily Dose* Pymt Type      *Per CDC guidance, the MME conversion factors prescribed or provided as part of the medication-assisted treatment for opioid use disorder should not be used to benchmark against dosage thresholds meant for opioids prescribed for pain  Buprenorphine products have no agreed upon morphine equivalency, and as partial opioid agonists, are not expected to be associated with overdose risk in the same dose-dependent manner as doses for full agonist opioids  MME = morphine milligram equivalents  LME = Lorazepam milligram equivalents  MG = dose in milligrams     Providers

## 2020-02-24 ENCOUNTER — TELEPHONE (OUTPATIENT)
Dept: FAMILY MEDICINE CLINIC | Facility: CLINIC | Age: 75
End: 2020-02-24

## 2020-02-26 ENCOUNTER — OFFICE VISIT (OUTPATIENT)
Dept: OBGYN CLINIC | Facility: CLINIC | Age: 75
End: 2020-02-26
Payer: COMMERCIAL

## 2020-02-26 VITALS — DIASTOLIC BLOOD PRESSURE: 58 MMHG | WEIGHT: 123.6 LBS | BODY MASS INDEX: 21.89 KG/M2 | SYSTOLIC BLOOD PRESSURE: 112 MMHG

## 2020-02-26 DIAGNOSIS — S42.91XD CLOSED FRACTURE OF RIGHT SHOULDER WITH ROUTINE HEALING, SUBSEQUENT ENCOUNTER: ICD-10-CM

## 2020-02-26 DIAGNOSIS — S42.294A OTHER CLOSED NONDISPLACED FRACTURE OF PROXIMAL END OF RIGHT HUMERUS, INITIAL ENCOUNTER: ICD-10-CM

## 2020-02-26 PROBLEM — S42.201D CLOSED FRACTURE OF PROXIMAL END OF RIGHT HUMERUS WITH ROUTINE HEALING: Status: ACTIVE | Noted: 2020-02-15

## 2020-02-26 PROBLEM — S42.201A CLOSED FRACTURE OF RIGHT PROXIMAL HUMERUS: Status: ACTIVE | Noted: 2020-02-15

## 2020-02-26 PROCEDURE — 99243 OFF/OP CNSLTJ NEW/EST LOW 30: CPT | Performed by: ORTHOPAEDIC SURGERY

## 2020-02-26 PROCEDURE — 1160F RVW MEDS BY RX/DR IN RCRD: CPT | Performed by: ORTHOPAEDIC SURGERY

## 2020-02-26 NOTE — PROGRESS NOTES
ASSESSMENT/PLAN:    Diagnoses and all orders for this visit:    Other closed nondisplaced fracture of proximal end of right humerus, initial encounter      Plan:  I have discussed the injury with Margarito and the treatment recommendation  I would recommend that she continue utilizing the sling but begin a pendulum exercise program   Precautions have been reviewed  She was seen by Orthopedics during her hospitalization at [facility]  She would prefer to return to see this orthopedic surgeon to avoid any additional charges as discussed  I will be happy to see her if she is unable to follow-up with the orthopedic surgeon who had seen her at the hospital   I would recommend follow-up within 7-10 days if she chooses to return  X-rays will be obtained at her follow-up and then she would likely need a course of physical therapy  I have stressed the importance that she begin her pendulum exercise program at this time despite the fact that she continues to experience pain  Return if symptoms worsen or fail to improve       _____________________________________________________  CHIEF COMPLAINT:  Chief Complaint   Patient presents with    Right Shoulder - Pain         SUBJECTIVE:  Helen Raphael is a 75y o  year old female who presents for evaluation of her right shoulder injury  The patient states she fell at home on 02/15/2020  She was seen in the emergency room at [facility], subsequently admitted and evaluated by Orthopedics, according to the discharge paperwork reviewed on the patient's Care everywhere chart  She states that she was not aware that she was seen by Orthopedics nor that she was to follow up with Orthopedics that had seen her at the hospital   She was seen by her primary care physician for routine post discharge follow-up, x-rays of her shoulder were ordered by the family physician and she was referred for orthopedic consultation and treatment    She continues to complain of pain at the shoulder, periscapular region, the upper back and neck, radiating into the entire right upper extremity with intermittent paresthesias in her right hand  She denies left shoulder or upper extremity symptoms  She continues using a combination of a sling and a shoulder immobilizer  PAST MEDICAL HISTORY:  History reviewed  No pertinent past medical history  PAST SURGICAL HISTORY:  Past Surgical History:   Procedure Laterality Date    APPENDECTOMY      CATARACT EXTRACTION      LAPAROSCOPIC APPENDECTOMY      incidental     HI INCISE FINGER TENDON SHEATH Left 2/26/2019    Procedure: LONG FINGER TRIGGER FINGER RELEASE;  Surgeon: Lauren Hernandez MD;  [facility];  Service: Orthopedics    TONSILLECTOMY      TUBAL LIGATION         FAMILY HISTORY:  Family History   Problem Relation Age of Onset    Lung cancer Mother     Hypertension Mother     Cancer Mother     Cerebral aneurysm Father         bleed    Alcohol abuse Father     Cirrhosis Father     Other Father         [facility] Other Sister         unkownn cause     Pneumonia Brother         infancy of pneumonia    Other Sister         in MVA       SOCIAL HISTORY:  Social History     Tobacco Use    Smoking status: Former Smoker     Packs/day: 1 00    Smokeless tobacco: Never Used    Tobacco comment: age [age]    Substance Use Topics    Alcohol use:  Yes     Alcohol/week: 1 0 - 4 0 standard drinks     Types: 1 - 4 Standard drinks or equivalent per week     Comment: 1-4 drinks per day     Drug use: No       MEDICATIONS:    Current Outpatient Medications:     aspirin 81 mg chewable tablet, Chew 81 mg daily, Disp: , Rfl:     Cholecalciferol (VITAMIN D3) 50 MCG (2000 UT) TABS, Take 2,000 Units by mouth daily, Disp: , Rfl:     fluticasone (FLONASE) 50 mcg/act nasal spray, into each nostril, Disp: , Rfl:     lisinopril (ZESTRIL) 10 mg tablet, Take 1 tablet (10 mg total) by mouth daily, Disp: 90 tablet, Rfl: 1    Multiple Vitamins-Minerals (CENTRUM SILVER 50+WOMEN PO), Take by mouth, Disp: , Rfl:     oxyCODONE-acetaminophen (PERCOCET) 5-325 mg per tablet, Take 1 tablet by mouth every 8 (eight) hours as needed for severe painMax Daily Amount: 3 tablets, Disp: 10 tablet, Rfl: 0    senna-docusate sodium (SENOKOT-S) 8 6-50 mg per tablet, Take 1 tablet by mouth 2 (two) times a day, Disp: , Rfl:     cetirizine (ZYRTEC ALLERGY) 10 mg tablet, Take by mouth, Disp: , Rfl:     diphenhydrAMINE (BENADRYL) 25 mg capsule, Take by mouth, Disp: , Rfl:     ALLERGIES:  Allergies   Allergen Reactions    Cat Hair Extract     Dust Mite Extract     Iv Dye  [Iodinated Diagnostic Agents] Hives     Category: Allergy;     Other      Possible reaction to chlorhexadine, but this occurred days after and in other areas of the body than just the area prepped    Pollen Extract      Seasonal allergies       Review of systems:   Constitutional: Negative for fatigue, fever or loss of apetite  HENT: Negative  Respiratory: Negative for shortness of breath, dyspnea  Cardiovascular: Negative for chest pain/tightness  Gastrointestinal: Negative for abdominal pain, N/V  Endocrine: Negative for cold/heat intolerance, unexplained weight loss/gain  Genitourinary: Negative for flank pain, dysuria, hematuria  Musculoskeletal:  Positive as in the HPI   Skin: Negative for rash  Neurological:  Positive as in the HPI  Psychiatric/Behavioral: Negative for agitation  _____________________________________________________  PHYSICAL EXAMINATION:    Blood pressure 112/58, weight 56 1 kg (123 lb 9 6 oz)  General: well developed and well nourished, alert, oriented times 3 and appears uncomfortable  Psychiatric: Normal  HEENT: Benign  Cardiovascular: Regular    Pulmonary: No wheezing or stridor  Abdomen: Soft, Nontender  Skin: No masses, erythema, lacerations, fluctation, ulcerations  Neurovascular:   Motor and sensory exams are grossly intact except as limited by her injury  Pulses are palpable  MUSCULOSKELETAL EXAMINATION:    The right shoulder exam demonstrates mild swelling, ecchymosis in markedly limited range of motion with significant apprehension  After some reassurance, she allowed me to place the elbow, forearm, wrist and hand through range of motion without change in her baseline pain  She has no tenderness to palpation of the elbow and more distally throughout the right upper extremity  Sensation is intact distally  She has no tenderness over the scapula, periscapular muscles, the cervical spine, paraspinal muscles or the thoracic spine and paraspinal muscles  _____________________________________________________  STUDIES REVIEWED:  Initial x-rays were not available for my review  X-rays were ordered by her primary care physician and had been obtained on 02/21/2020  These demonstrate a nondisplaced fracture of the proximal right humerus at the surgical neck level with involvement of the greater tuberosity but without displacement  The report was reviewed          Horace Kathleen

## 2020-08-27 ENCOUNTER — OFFICE VISIT (OUTPATIENT)
Dept: FAMILY MEDICINE CLINIC | Facility: CLINIC | Age: 75
End: 2020-08-27
Payer: COMMERCIAL

## 2020-08-27 VITALS
TEMPERATURE: 97.1 F | OXYGEN SATURATION: 100 % | RESPIRATION RATE: 17 BRPM | BODY MASS INDEX: 20.91 KG/M2 | SYSTOLIC BLOOD PRESSURE: 140 MMHG | DIASTOLIC BLOOD PRESSURE: 82 MMHG | HEART RATE: 80 BPM | HEIGHT: 63 IN | WEIGHT: 118 LBS

## 2020-08-27 DIAGNOSIS — L03.115 CELLULITIS OF RIGHT LOWER EXTREMITY: ICD-10-CM

## 2020-08-27 DIAGNOSIS — D64.9 ANEMIA, UNSPECIFIED TYPE: ICD-10-CM

## 2020-08-27 DIAGNOSIS — I10 BENIGN ESSENTIAL HYPERTENSION: ICD-10-CM
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
## 2020-08-27 DIAGNOSIS — E78.9 BORDERLINE HIGH CHOLESTEROL: ICD-10-CM

## 2020-08-27 DIAGNOSIS — Z13.1 DIABETES MELLITUS SCREENING: ICD-10-CM

## 2020-08-27 DIAGNOSIS — R23.8 EASY BRUISING: ICD-10-CM

## 2020-08-27 PROCEDURE — 3008F BODY MASS INDEX DOCD: CPT | Performed by: PHYSICIAN ASSISTANT

## 2020-08-27 PROCEDURE — 3077F SYST BP >= 140 MM HG: CPT | Performed by: PHYSICIAN ASSISTANT

## 2020-08-27 PROCEDURE — 3079F DIAST BP 80-89 MM HG: CPT | Performed by: PHYSICIAN ASSISTANT

## 2020-08-27 PROCEDURE — 1036F TOBACCO NON-USER: CPT | Performed by: PHYSICIAN ASSISTANT

## 2020-08-27 PROCEDURE — 99214 OFFICE O/P EST MOD 30 MIN: CPT | Performed by: PHYSICIAN ASSISTANT

## 2020-08-27 PROCEDURE — 1160F RVW MEDS BY RX/DR IN RCRD: CPT | Performed by: PHYSICIAN ASSISTANT

## 2020-08-27 PROCEDURE — 4040F PNEUMOC VAC/ADMIN/RCVD: CPT | Performed by: PHYSICIAN ASSISTANT

## 2020-08-27 RX ORDER — AZITHROMYCIN 250 MG/1
TABLET, FILM COATED ORAL
Qty: 6 TABLET | Refills: 0 | Status: SHIPPED | OUTPATIENT
Start: 2020-08-27 | End: 2020-08-31

## 2020-08-27 NOTE — PROGRESS NOTES
Assessment/Plan:      Diagnoses and all orders for this visit:    Cat scratch  -     azithromycin (ZITHROMAX) 250 mg tablet; Take 2 tablets x 1 days, then 1 tablet x 4 days   -     Hemoglobin A1C; Future    Cellulitis of right lower extremity  -     azithromycin (ZITHROMAX) 250 mg tablet; Take 2 tablets x 1 days, then 1 tablet x 4 days   -     Hemoglobin A1C; Future  - if pain and swelling does not improve and or develop fevers, then proceed to ER     Easy bruising  -     CBC and differential; Future  -     Iron Panel (Includes Ferritin, Iron Sat%, Iron, and TIBC); Future  -     Hemoglobin A1C; Future  - she was anemic during hospitalization in Feb, repeat     Anemia, unspecified type  -     CBC and differential; Future  -     Iron Panel (Includes Ferritin, Iron Sat%, Iron, and TIBC); Future  -     Hemoglobin A1C; Future    Benign essential hypertension  -     Basic metabolic panel; Future  -     Hemoglobin A1C; Future    Borderline high cholesterol  -     Hemoglobin A1C; Future  -     Lipid panel; Future    Diabetes mellitus screening  -     Hemoglobin A1C; Future          Subjective:     Patient ID: Naresh Gonzalez is a 75 y o  female  Chief Complaint   Patient presents with    Animal Bite     Has multiple cat bites  Swollen ankle and painful  Happened two mornings ago      HPI   Patient is a 74 yo female who presents for swollen and painful area around her LLE after her cat scratched her  Her 2 cats were fighting and she pulled them apart  The one cat scratched her arm and the other her ankle  There is a small scratch on ankle that is red but then up her shin is swollen  It is painful to walk, using a cane  She has no fevers  Of note, patient has a lot of bruises on her forearms  She states that she bruises so easily  No fall or injuries  She feel safe at home  She states that even if she nicks her arm while gardening she bruises easily   She was anemic while in hospital in Feb      Review of Systems Constitutional: Negative for chills, diaphoresis and fever  Respiratory: Negative for shortness of breath  Cardiovascular: Negative for chest pain  Musculoskeletal: Positive for gait problem (use cane due to pain in L ankle )  Negative for myalgias  Skin: Positive for color change and wound  Neurological: Negative for dizziness, syncope and weakness  Hematological: Negative for adenopathy  Bruises/bleeds easily  Objective:  Vitals:    08/27/20 1300   BP: 140/82   Pulse: 80   Resp: 17   Temp: (!) 97 1 °F (36 2 °C)   SpO2: 100%        Physical Exam  Constitutional:       General: She is not in acute distress  Comments: Thin female    HENT:      Head: Normocephalic and atraumatic  Cardiovascular:      Rate and Rhythm: Normal rate and regular rhythm  Pulmonary:      Effort: Pulmonary effort is normal  No respiratory distress  Skin:     General: Skin is warm and dry  Findings: Bruising (across b/l forearms; several healing scratches without cellulitis) present  Neurological:      General: No focal deficit present  Mental Status: She is alert and oriented to person, place, and time     Psychiatric:         Mood and Affect: Mood normal

## 2020-09-09 DIAGNOSIS — I10 BENIGN ESSENTIAL HYPERTENSION: ICD-10-CM

## 2020-09-09 RX ORDER — LISINOPRIL 10 MG/1
10 TABLET ORAL DAILY
Qty: 90 TABLET | Refills: 1 | Status: SHIPPED | OUTPATIENT
Start: 2020-09-09 | End: 2021-03-23

## 2020-09-09 NOTE — TELEPHONE ENCOUNTER
Last refill date: 2/18/20 # 90 with one refill  Last appointment: 8/27/20  Next appointment: no future appointment

## 2021-03-23 DIAGNOSIS — I10 BENIGN ESSENTIAL HYPERTENSION: ICD-10-CM

## 2021-03-23 RX ORDER — LISINOPRIL 10 MG/1
TABLET ORAL
Qty: 90 TABLET | Refills: 0 | Status: SHIPPED | OUTPATIENT
Start: 2021-03-23 | End: 2021-07-14

## 2021-04-13 ENCOUNTER — APPOINTMENT (OUTPATIENT)
Dept: LAB | Facility: CLINIC | Age: 76
End: 2021-04-13
Payer: COMMERCIAL

## 2021-04-13 DIAGNOSIS — L03.115 CELLULITIS OF RIGHT LOWER EXTREMITY: ICD-10-CM
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
## 2021-04-13 DIAGNOSIS — D64.9 ANEMIA, UNSPECIFIED TYPE: ICD-10-CM

## 2021-04-13 DIAGNOSIS — Z13.1 DIABETES MELLITUS SCREENING: ICD-10-CM

## 2021-04-13 DIAGNOSIS — I10 BENIGN ESSENTIAL HYPERTENSION: ICD-10-CM

## 2021-04-13 DIAGNOSIS — R23.8 EASY BRUISING: ICD-10-CM

## 2021-04-13 DIAGNOSIS — E78.9 BORDERLINE HIGH CHOLESTEROL: ICD-10-CM

## 2021-04-13 LAB
ANION GAP SERPL CALCULATED.3IONS-SCNC: 2 MMOL/L (ref 4–13)
BASOPHILS # BLD AUTO: 0.04 THOUSANDS/ΜL (ref 0–0.1)
BASOPHILS NFR BLD AUTO: 1 % (ref 0–1)
BUN SERPL-MCNC: 25 MG/DL (ref 5–25)
CALCIUM SERPL-MCNC: 9 MG/DL (ref 8.3–10.1)
CHLORIDE SERPL-SCNC: 108 MMOL/L (ref 100–108)
CHOLEST SERPL-MCNC: 193 MG/DL (ref 50–200)
CO2 SERPL-SCNC: 28 MMOL/L (ref 21–32)
CREAT SERPL-MCNC: 1.42 MG/DL (ref 0.6–1.3)
EOSINOPHIL # BLD AUTO: 0.09 THOUSAND/ΜL (ref 0–0.61)
EOSINOPHIL NFR BLD AUTO: 2 % (ref 0–6)
ERYTHROCYTE [DISTWIDTH] IN BLOOD BY AUTOMATED COUNT: 11.4 % (ref 11.6–15.1)
FERRITIN SERPL-MCNC: 139 NG/ML (ref 8–388)
GFR SERPL CREATININE-BSD FRML MDRD: 36 ML/MIN/1.73SQ M
GLUCOSE P FAST SERPL-MCNC: 80 MG/DL (ref 65–99)
HCT VFR BLD AUTO: 36.1 % (ref 34.8–46.1)
HDLC SERPL-MCNC: 75 MG/DL
HGB BLD-MCNC: 11.6 G/DL (ref 11.5–15.4)
IMM GRANULOCYTES # BLD AUTO: 0.02 THOUSAND/UL (ref 0–0.2)
IMM GRANULOCYTES NFR BLD AUTO: 0 % (ref 0–2)
IRON SATN MFR SERPL: 41 %
IRON SERPL-MCNC: 118 UG/DL (ref 50–170)
LDLC SERPL CALC-MCNC: 95 MG/DL (ref 0–100)
LYMPHOCYTES # BLD AUTO: 1.26 THOUSANDS/ΜL (ref 0.6–4.47)
LYMPHOCYTES NFR BLD AUTO: 27 % (ref 14–44)
MCH RBC QN AUTO: 32.4 PG (ref 26.8–34.3)
MCHC RBC AUTO-ENTMCNC: 32.1 G/DL (ref 31.4–37.4)
MCV RBC AUTO: 101 FL (ref 82–98)
MONOCYTES # BLD AUTO: 0.38 THOUSAND/ΜL (ref 0.17–1.22)
MONOCYTES NFR BLD AUTO: 8 % (ref 4–12)
NEUTROPHILS # BLD AUTO: 2.92 THOUSANDS/ΜL (ref 1.85–7.62)
NEUTS SEG NFR BLD AUTO: 62 % (ref 43–75)
NONHDLC SERPL-MCNC: 118 MG/DL
NRBC BLD AUTO-RTO: 0 /100 WBCS
PLATELET # BLD AUTO: 313 THOUSANDS/UL (ref 149–390)
PMV BLD AUTO: 10.8 FL (ref 8.9–12.7)
POTASSIUM SERPL-SCNC: 4.9 MMOL/L (ref 3.5–5.3)
RBC # BLD AUTO: 3.58 MILLION/UL (ref 3.81–5.12)
SODIUM SERPL-SCNC: 138 MMOL/L (ref 136–145)
TIBC SERPL-MCNC: 290 UG/DL (ref 250–450)
TRIGL SERPL-MCNC: 113 MG/DL
WBC # BLD AUTO: 4.71 THOUSAND/UL (ref 4.31–10.16)

## 2021-04-13 PROCEDURE — 83036 HEMOGLOBIN GLYCOSYLATED A1C: CPT

## 2021-04-13 PROCEDURE — 83550 IRON BINDING TEST: CPT

## 2021-04-13 PROCEDURE — 80061 LIPID PANEL: CPT

## 2021-04-13 PROCEDURE — 80048 BASIC METABOLIC PNL TOTAL CA: CPT

## 2021-04-13 PROCEDURE — 85025 COMPLETE CBC W/AUTO DIFF WBC: CPT

## 2021-04-13 PROCEDURE — 82728 ASSAY OF FERRITIN: CPT

## 2021-04-13 PROCEDURE — 83540 ASSAY OF IRON: CPT

## 2021-04-13 PROCEDURE — 36415 COLL VENOUS BLD VENIPUNCTURE: CPT

## 2021-04-14 DIAGNOSIS — N18.32 STAGE 3B CHRONIC KIDNEY DISEASE (HCC): Primary | ICD-10-CM

## 2021-04-14 LAB
EST. AVERAGE GLUCOSE BLD GHB EST-MCNC: 103 MG/DL
HBA1C MFR BLD: 5.2 %

## 2021-04-14 NOTE — TELEPHONE ENCOUNTER
Message left requesting a refill of Lisinopril  Pharmacy is [facility] in [facility]  Prescription was electronically sent to pharmacy on March 23,2021 for a quantity of 90  Refill not needed  Called pharmacy who confirmed prescription was on file and will fill for patient

## 2021-08-17 ENCOUNTER — TELEPHONE (OUTPATIENT)
Dept: FAMILY MEDICINE CLINIC | Facility: CLINIC | Age: 76
End: 2021-08-17

## 2021-08-18 DIAGNOSIS — N18.32 STAGE 3B CHRONIC KIDNEY DISEASE (HCC): Primary | ICD-10-CM

## 2021-08-18 DIAGNOSIS — I10 BENIGN ESSENTIAL HYPERTENSION: ICD-10-CM

## 2021-08-18 RX ORDER — LISINOPRIL 10 MG/1
10 TABLET ORAL DAILY
Qty: 30 TABLET | Refills: 0 | OUTPATIENT
Start: 2021-08-18

## 2021-08-18 RX ORDER — LISINOPRIL 10 MG/1
10 TABLET ORAL DAILY
Qty: 30 TABLET | Refills: 0 | Status: SHIPPED | OUTPATIENT
Start: 2021-08-18 | End: 2021-09-14

## 2021-08-18 NOTE — TELEPHONE ENCOUNTER
Patient scheduled for 09/14/2021 - we tried other dates but you were either booked up or out of the office on those dates  Can you please send enough medication to hold her to appointment to [facility]? Also, patient had labs completed 04/2021  What lab work do you want her to complete for 9/14 appointment? Patient is aware she has an open Dexa scan as well

## 2021-08-18 NOTE — TELEPHONE ENCOUNTER
We have been trying to reach patient since march as she needs an appt for refills  If she is able to schedule and keep an appt, I will authorize enough to get to that appt   She also needs labs done

## 2021-08-18 NOTE — TELEPHONE ENCOUNTER
Requested medication(s) are due for refill today: Yes  Patient has already received a courtesy refill: Yes  Other reason request has been forwarded to provider: Protocol failed

## 2021-08-18 NOTE — TELEPHONE ENCOUNTER
Repeat kidney function labs placed in chart to be completed prior to follow up   She was also ordered to have ultrasound of kidneys

## 2021-08-19 NOTE — TELEPHONE ENCOUNTER
Relayed message to patient  She took information to call to schedule US, knows to get her lab work done, and is aware to  minimal refill at her pharmacy

## 2021-08-23 ENCOUNTER — HOSPITAL ENCOUNTER (OUTPATIENT)
Dept: ULTRASOUND IMAGING | Facility: HOSPITAL | Age: 76
Discharge: HOME | End: 2021-08-23
Payer: COMMERCIAL

## 2021-08-23 DIAGNOSIS — N18.32 STAGE 3B CHRONIC KIDNEY DISEASE (HCC): ICD-10-CM

## 2021-08-23 PROCEDURE — 76770 US EXAM ABDO BACK WALL COMP: CPT

## 2021-08-30 ENCOUNTER — APPOINTMENT (OUTPATIENT)
Dept: LAB | Facility: CLINIC | Age: 76
End: 2021-08-30
Payer: COMMERCIAL

## 2021-08-30 DIAGNOSIS — N18.32 STAGE 3B CHRONIC KIDNEY DISEASE (HCC): ICD-10-CM

## 2021-08-30 LAB
ALBUMIN SERPL BCP-MCNC: 3.3 G/DL (ref 3.5–5)
ALP SERPL-CCNC: 94 U/L (ref 46–116)
ALT SERPL W P-5'-P-CCNC: 24 U/L (ref 12–78)
ANION GAP SERPL CALCULATED.3IONS-SCNC: 3 MMOL/L (ref 4–13)
AST SERPL W P-5'-P-CCNC: 19 U/L (ref 5–45)
BILIRUB SERPL-MCNC: 0.37 MG/DL (ref 0.2–1)
BUN SERPL-MCNC: 19 MG/DL (ref 5–25)
CALCIUM ALBUM COR SERPL-MCNC: 9.4 MG/DL (ref 8.3–10.1)
CALCIUM SERPL-MCNC: 8.8 MG/DL (ref 8.3–10.1)
CHLORIDE SERPL-SCNC: 109 MMOL/L (ref 100–108)
CO2 SERPL-SCNC: 26 MMOL/L (ref 21–32)
CREAT SERPL-MCNC: 1.19 MG/DL (ref 0.6–1.3)
GFR SERPL CREATININE-BSD FRML MDRD: 44 ML/MIN/1.73SQ M
GLUCOSE P FAST SERPL-MCNC: 101 MG/DL (ref 65–99)
POTASSIUM SERPL-SCNC: 5.5 MMOL/L (ref 3.5–5.3)
PROT SERPL-MCNC: 6.7 G/DL (ref 6.4–8.2)
SODIUM SERPL-SCNC: 138 MMOL/L (ref 136–145)

## 2021-08-30 PROCEDURE — 36415 COLL VENOUS BLD VENIPUNCTURE: CPT

## 2021-08-30 PROCEDURE — 80053 COMPREHEN METABOLIC PANEL: CPT

## 2021-09-06 DIAGNOSIS — N18.32 STAGE 3B CHRONIC KIDNEY DISEASE (HCC): ICD-10-CM

## 2021-09-06 DIAGNOSIS — E87.5 HYPERKALEMIA: Primary | ICD-10-CM

## 2021-09-14 ENCOUNTER — OFFICE VISIT (OUTPATIENT)
Dept: FAMILY MEDICINE CLINIC | Facility: CLINIC | Age: 76
End: 2021-09-14
Payer: COMMERCIAL

## 2021-09-14 VITALS
OXYGEN SATURATION: 98 % | SYSTOLIC BLOOD PRESSURE: 146 MMHG | WEIGHT: 117 LBS | BODY MASS INDEX: 19.97 KG/M2 | DIASTOLIC BLOOD PRESSURE: 70 MMHG | HEIGHT: 64 IN | TEMPERATURE: 97.9 F | HEART RATE: 52 BPM

## 2021-09-14 DIAGNOSIS — R00.2 HEART PALPITATIONS: ICD-10-CM

## 2021-09-14 DIAGNOSIS — R07.89 ATYPICAL CHEST PAIN: Primary | ICD-10-CM

## 2021-09-14 DIAGNOSIS — M05.742 RHEUMATOID ARTHRITIS INVOLVING BOTH HANDS WITH POSITIVE RHEUMATOID FACTOR (HCC): ICD-10-CM

## 2021-09-14 DIAGNOSIS — N18.32 STAGE 3B CHRONIC KIDNEY DISEASE (HCC): ICD-10-CM

## 2021-09-14 DIAGNOSIS — R51.9 GENERALIZED HEADACHES: ICD-10-CM

## 2021-09-14 DIAGNOSIS — I10 BENIGN ESSENTIAL HYPERTENSION: ICD-10-CM

## 2021-09-14 DIAGNOSIS — M05.741 RHEUMATOID ARTHRITIS INVOLVING BOTH HANDS WITH POSITIVE RHEUMATOID FACTOR (HCC): ICD-10-CM

## 2021-09-14 DIAGNOSIS — M85.80 OSTEOPENIA, UNSPECIFIED LOCATION: ICD-10-CM

## 2021-09-14 DIAGNOSIS — E87.5 HYPERKALEMIA: ICD-10-CM

## 2021-09-14 PROCEDURE — 99215 OFFICE O/P EST HI 40 MIN: CPT | Performed by: PHYSICIAN ASSISTANT

## 2021-09-14 PROCEDURE — 1036F TOBACCO NON-USER: CPT | Performed by: PHYSICIAN ASSISTANT

## 2021-09-14 PROCEDURE — 1160F RVW MEDS BY RX/DR IN RCRD: CPT | Performed by: PHYSICIAN ASSISTANT

## 2021-09-14 PROCEDURE — 3725F SCREEN DEPRESSION PERFORMED: CPT | Performed by: PHYSICIAN ASSISTANT

## 2021-09-14 RX ORDER — DOXAZOSIN MESYLATE 1 MG/1
1 TABLET ORAL DAILY
Qty: 90 TABLET | Refills: 0 | Status: SHIPPED | OUTPATIENT
Start: 2021-09-14 | End: 2021-10-18 | Stop reason: SDUPTHER

## 2021-09-14 NOTE — PROGRESS NOTES
Routine Follow-up    Domingo Martin 67 y o  female   Date:  9/14/2021      Assessment and Plan:    Shuana Torres was seen today for follow-up  Diagnoses and all orders for this visit:    Atypical chest pain  -     ECG 12 lead; Future  - atypical clincial picture which is reassuring  - improve BP control    Osteopenia, unspecified location  -     DXA bone density spine hip and pelvis; Future    Heart palpitations  -     ECG 12 lead; Future  -     CBC and differential; Future  -     TSH, 3rd generation with Free T4 reflex; Future  - not agreeable to further testing such as holter, echo    Benign essential hypertension  -     doxazosin (CARDURA) 1 mg tablet; Take 1 tablet (1 mg total) by mouth daily  -     ECG 12 lead; Future  - did not tolerate HCTZ, amlodipine; would avoid beta blocker as HR rests 50-60s at baseline  - follow up in 1-2 weeks for BP recheck    Hyperkalemia  -     Comprehensive metabolic panel; Future  -     ECG 12 lead; Future  - will stop ACEI and continue to monitor as this is a recurrent issue    Rheumatoid arthritis involving both hands with positive rheumatoid factor ([facility] [address] )  - avoid NSAIDs  - may use tylenol, topical lidocaine    Stage 3b chronic kidney disease (HCC)  -     Comprehensive metabolic panel; Future  - ultrasound consistent with mild kidney disease  - avoid NSAIDs  - encourage hydration  - improve BP control    Headaches  - continue to monitor labs  - improve BP, encourage home BP monitoring especially when symptomatic  - follow up in 1-2 weeks and continue to monitor   - difficult to completely work up due to resistance for testing       HPI:  Chief Complaint   Patient presents with    Follow-up     Review BW, pt also c/o frequent headaches     HPI  Patient is a 66 yo female who presents for overdue follow up  Her BP is borderline  She does not have BP monitor at home  She has off and on almost daily headaches, "feels like my head is going explode"   No associated change in vision, lightheadedness, nausea  She feels shaky  She has ringing in the ears, all the time with the headaches  She feels off balance  No falls, no syncope  "My heart is weird too, for a couple months feels like it goes too fast and does not feel normal"  Seldomly will get chest pain, at rest when lying in bed  Her recent labs displayed hyperkalemia and  CKD  Her Cr ranges 1 1-1 4 and GFR ranged 30-50s  He is not taking a K supplement  She is currently on ACEi  She had ankle swelling on amlodipine in the past  She did not tolerate HCTZ in the past  Her HR rests low 50-60s and would avoid bb  She has persistent pain in her hands from RA  She cannot take NSAIDs  When discussing testing to work up her conerns above, she states "she this is why I did not want to come here because this will all be too expensive"  She is only agreeable to monitor EKG and repeat labs following change in BP medications  ROS: Review of Systems   Constitutional: Negative for activity change, diaphoresis and fever  HENT: Positive for tinnitus  Negative for congestion, rhinorrhea, sore throat and trouble swallowing  Respiratory: Negative for cough, shortness of breath and wheezing  Cardiovascular: Positive for chest pain and palpitations  Negative for leg swelling  Gastrointestinal: Negative  Genitourinary: Negative for decreased urine volume, difficulty urinating, dysuria and hematuria  Musculoskeletal: Positive for arthralgias  Negative for gait problem  Skin: Negative for color change and wound  Neurological: Positive for headaches  Negative for syncope, weakness and numbness  History reviewed  No pertinent past medical history    Patient Active Problem List   Diagnosis    Abnormal EKG    Allergic rhinitis    Benign essential hypertension    Cataract    Intermittent palpitations    Lung density on x-ray    Osteopenia    Visual field scotoma of both eyes    S/P trigger finger release    Rheumatoid arthritis involving both hands with positive rheumatoid factor (HCC)    NSTEMI (non-ST elevated myocardial infarction) ([facility] [address] )    Closed fracture of right proximal humerus       Past Surgical History:   Procedure Laterality Date    APPENDECTOMY      CATARACT EXTRACTION      LAPAROSCOPIC APPENDECTOMY      incidental     IN INCISE FINGER TENDON SHEATH Left 2/26/2019    Procedure: LONG FINGER TRIGGER FINGER RELEASE;  Surgeon: Magan Garcia MD;  [facility];  Service: Orthopedics    TONSILLECTOMY      TUBAL LIGATION         Social History     Socioeconomic History    Marital status: /Civil Union     Spouse name: None    Number of children: 3    Years of education: None    Highest education level: None   Occupational History    None   Tobacco Use    Smoking status: Former Smoker     Packs/day: 1 00    Smokeless tobacco: Never Used    Tobacco comment: age [age]    Vaping Use    Vaping Use: Never used   Substance and Sexual Activity    Alcohol use: Yes     Alcohol/week: 1 0 - 4 0 standard drinks     Types: 1 - 4 Standard drinks or equivalent per week     Comment: 1-4 drinks per day     Drug use: No    Sexual activity: Not Currently   Other Topics Concern    None   Social History Narrative    Always uses seat belt     Caffeine use 2 cans/cups per day     Exercises regularly     Housewife or homemaker      Social Determinants of Health     Financial Resource Strain:     Difficulty of Paying Living Expenses:    Food Insecurity:     Worried About Running Out of Food in the Last Year:     Ran Out of Food in the Last Year:    Transportation Needs:     Lack of Transportation (Medical):      Lack of Transportation (Non-Medical):    Physical Activity:     Days of Exercise per Week:     Minutes of Exercise per Session:    Stress:     Feeling of Stress :    Social Connections:     Frequency of Communication with Friends and Family:     Frequency of Social Gatherings with Friends and Family:     Attends Jehovah's witness Services:     Active Member of Clubs or Organizations:     Attends Club or Organization Meetings:     Marital Status:    Intimate Partner Violence:     Fear of Current or Ex-Partner:     Emotionally Abused:     Physically Abused:     Sexually Abused:        Family History   Problem Relation Age of Onset    Lung cancer Mother     Hypertension Mother     Cancer Mother     Cerebral aneurysm Father         bleed    Alcohol abuse Father     Cirrhosis Father     Other Father         had fallen   Keary Jose Other Sister         unkownn cause     Pneumonia Brother         infancy of pneumonia    Other Sister         in MVA       Allergies   Allergen Reactions    Cat Hair Extract     Dust Mite Extract     Iv Dye  [Iodinated Diagnostic Agents] Hives     Category: Allergy;     Other      Possible reaction to chlorhexadine, but this occurred days after and in other areas of the body than just the area prepped    Pollen Extract      Seasonal allergies         Current Outpatient Medications:     cetirizine (ZYRTEC ALLERGY) 10 mg tablet, Take by mouth, Disp: , Rfl:     Cholecalciferol (VITAMIN D3) 50 MCG (2000 UT) TABS, Take 2,000 Units by mouth daily, Disp: , Rfl:     fluticasone (FLONASE) 50 mcg/act nasal spray, into each nostril, Disp: , Rfl:     doxazosin (CARDURA) 1 mg tablet, Take 1 tablet (1 mg total) by mouth daily, Disp: 90 tablet, Rfl: 0      Physical Exam:  /70 (BP Location: Left arm, Patient Position: Sitting, Cuff Size: Adult)   Pulse (!) 52   Temp 97 9 °F (36 6 °C) (Tympanic)   Ht 5' 4" (1 626 m)   Wt 53 1 kg (117 lb)   SpO2 98%   BMI 20 08 kg/m²     Physical Exam  Constitutional:       General: She is not in acute distress  Appearance: Normal appearance  She is not toxic-appearing  HENT:      Head: Normocephalic and atraumatic        Right Ear: Tympanic membrane, ear canal and external ear normal       Left Ear: Tympanic membrane, ear canal and external ear normal       Nose: Nose normal       Mouth/Throat:      Mouth: Mucous membranes are moist    Eyes:      Extraocular Movements: Extraocular movements intact  Conjunctiva/sclera: Conjunctivae normal       Pupils: Pupils are equal, round, and reactive to light  Cardiovascular:      Rate and Rhythm: Normal rate and regular rhythm  Pulses: Normal pulses  Heart sounds: No murmur heard  Pulmonary:      Effort: Pulmonary effort is normal  No respiratory distress  Breath sounds: Normal breath sounds  No wheezing  Musculoskeletal:         General: Deformity: enlarged hand joints b/l  Cervical back: Normal range of motion and neck supple  Right lower leg: No edema  Left lower leg: No edema  Lymphadenopathy:      Cervical: No cervical adenopathy  Skin:     General: Skin is warm and dry  Coloration: Skin is not pale  Neurological:      General: No focal deficit present  Mental Status: She is alert and oriented to person, place, and time     Psychiatric:         Mood and Affect: Mood normal          Behavior: Behavior normal            Labs:  Lab Results   Component Value Date    WBC 3 70 (L) 09/17/2021    HGB 11 6 09/17/2021    HCT 36 2 09/17/2021     (H) 09/17/2021     09/17/2021     Lab Results   Component Value Date    K 4 9 09/17/2021     09/17/2021    CO2 27 09/17/2021    BUN 16 09/17/2021    CREATININE 1 05 09/17/2021    GLUF 103 (H) 09/17/2021    CALCIUM 8 8 09/17/2021    CORRECTEDCA 9 3 09/17/2021    AST 15 09/17/2021    ALT 24 09/17/2021    ALKPHOS 94 09/17/2021    EGFR 52 09/17/2021

## 2021-09-17 ENCOUNTER — APPOINTMENT (OUTPATIENT)
Dept: LAB | Facility: CLINIC | Age: 76
End: 2021-09-17
Payer: COMMERCIAL

## 2021-09-17 DIAGNOSIS — N18.32 STAGE 3B CHRONIC KIDNEY DISEASE (HCC): ICD-10-CM

## 2021-09-17 DIAGNOSIS — E87.5 HYPERKALEMIA: ICD-10-CM

## 2021-09-17 DIAGNOSIS — R00.2 HEART PALPITATIONS: ICD-10-CM

## 2021-09-17 LAB
ALBUMIN SERPL BCP-MCNC: 3.4 G/DL (ref 3.5–5)
ALP SERPL-CCNC: 94 U/L (ref 46–116)
ALT SERPL W P-5'-P-CCNC: 24 U/L (ref 12–78)
ANION GAP SERPL CALCULATED.3IONS-SCNC: 3 MMOL/L (ref 4–13)
AST SERPL W P-5'-P-CCNC: 15 U/L (ref 5–45)
BASOPHILS # BLD AUTO: 0.02 THOUSANDS/ΜL (ref 0–0.1)
BASOPHILS NFR BLD AUTO: 1 % (ref 0–1)
BILIRUB SERPL-MCNC: 0.69 MG/DL (ref 0.2–1)
BUN SERPL-MCNC: 16 MG/DL (ref 5–25)
CALCIUM ALBUM COR SERPL-MCNC: 9.3 MG/DL (ref 8.3–10.1)
CALCIUM SERPL-MCNC: 8.8 MG/DL (ref 8.3–10.1)
CHLORIDE SERPL-SCNC: 108 MMOL/L (ref 100–108)
CO2 SERPL-SCNC: 27 MMOL/L (ref 21–32)
CREAT SERPL-MCNC: 1.05 MG/DL (ref 0.6–1.3)
EOSINOPHIL # BLD AUTO: 0.07 THOUSAND/ΜL (ref 0–0.61)
EOSINOPHIL NFR BLD AUTO: 2 % (ref 0–6)
ERYTHROCYTE [DISTWIDTH] IN BLOOD BY AUTOMATED COUNT: 11.9 % (ref 11.6–15.1)
GFR SERPL CREATININE-BSD FRML MDRD: 52 ML/MIN/1.73SQ M
GLUCOSE P FAST SERPL-MCNC: 103 MG/DL (ref 65–99)
HCT VFR BLD AUTO: 36.2 % (ref 34.8–46.1)
HGB BLD-MCNC: 11.6 G/DL (ref 11.5–15.4)
IMM GRANULOCYTES # BLD AUTO: 0.01 THOUSAND/UL (ref 0–0.2)
IMM GRANULOCYTES NFR BLD AUTO: 0 % (ref 0–2)
LYMPHOCYTES # BLD AUTO: 1.17 THOUSANDS/ΜL (ref 0.6–4.47)
LYMPHOCYTES NFR BLD AUTO: 32 % (ref 14–44)
MCH RBC QN AUTO: 33 PG (ref 26.8–34.3)
MCHC RBC AUTO-ENTMCNC: 32 G/DL (ref 31.4–37.4)
MCV RBC AUTO: 103 FL (ref 82–98)
MONOCYTES # BLD AUTO: 0.38 THOUSAND/ΜL (ref 0.17–1.22)
MONOCYTES NFR BLD AUTO: 10 % (ref 4–12)
NEUTROPHILS # BLD AUTO: 2.05 THOUSANDS/ΜL (ref 1.85–7.62)
NEUTS SEG NFR BLD AUTO: 55 % (ref 43–75)
NRBC BLD AUTO-RTO: 0 /100 WBCS
PLATELET # BLD AUTO: 253 THOUSANDS/UL (ref 149–390)
PMV BLD AUTO: 10.9 FL (ref 8.9–12.7)
POTASSIUM SERPL-SCNC: 4.9 MMOL/L (ref 3.5–5.3)
PROT SERPL-MCNC: 6.7 G/DL (ref 6.4–8.2)
RBC # BLD AUTO: 3.52 MILLION/UL (ref 3.81–5.12)
SODIUM SERPL-SCNC: 138 MMOL/L (ref 136–145)
TSH SERPL DL<=0.05 MIU/L-ACNC: 0.97 UIU/ML (ref 0.36–3.74)
WBC # BLD AUTO: 3.7 THOUSAND/UL (ref 4.31–10.16)

## 2021-09-17 PROCEDURE — 84443 ASSAY THYROID STIM HORMONE: CPT

## 2021-09-17 PROCEDURE — 80053 COMPREHEN METABOLIC PANEL: CPT

## 2021-09-17 PROCEDURE — 85025 COMPLETE CBC W/AUTO DIFF WBC: CPT

## 2021-09-17 PROCEDURE — 36415 COLL VENOUS BLD VENIPUNCTURE: CPT

## 2021-09-28 ENCOUNTER — OFFICE VISIT (OUTPATIENT)
Dept: LAB | Age: 76
End: 2021-09-28
Payer: COMMERCIAL

## 2021-09-28 DIAGNOSIS — R00.2 HEART PALPITATIONS: ICD-10-CM

## 2021-09-28 DIAGNOSIS — E87.5 HYPERKALEMIA: ICD-10-CM

## 2021-09-28 DIAGNOSIS — R07.89 ATYPICAL CHEST PAIN: ICD-10-CM

## 2021-09-28 DIAGNOSIS — I10 BENIGN ESSENTIAL HYPERTENSION: ICD-10-CM

## 2021-09-28 PROCEDURE — 93005 ELECTROCARDIOGRAM TRACING: CPT

## 2021-09-29 LAB
ATRIAL RATE: 62 BPM
P AXIS: 81 DEGREES
PR INTERVAL: 160 MS
QRS AXIS: 50 DEGREES
QRSD INTERVAL: 52 MS
QT INTERVAL: 398 MS
QTC INTERVAL: 403 MS
T WAVE AXIS: 51 DEGREES
VENTRICULAR RATE: 62 BPM

## 2021-09-29 PROCEDURE — 93010 ELECTROCARDIOGRAM REPORT: CPT | Performed by: INTERNAL MEDICINE

## 2021-10-18 ENCOUNTER — OFFICE VISIT (OUTPATIENT)
Dept: FAMILY MEDICINE CLINIC | Facility: CLINIC | Age: 76
End: 2021-10-18
Payer: COMMERCIAL

## 2021-10-18 VITALS
TEMPERATURE: 97.9 F | RESPIRATION RATE: 17 BRPM | HEIGHT: 64 IN | DIASTOLIC BLOOD PRESSURE: 66 MMHG | SYSTOLIC BLOOD PRESSURE: 147 MMHG | BODY MASS INDEX: 19.81 KG/M2 | OXYGEN SATURATION: 100 % | WEIGHT: 116 LBS | HEART RATE: 54 BPM

## 2021-10-18 DIAGNOSIS — R10.2 SUPRAPUBIC DISCOMFORT: ICD-10-CM

## 2021-10-18 DIAGNOSIS — Z23 FLU VACCINE NEED: ICD-10-CM

## 2021-10-18 DIAGNOSIS — I10 BENIGN ESSENTIAL HYPERTENSION: Primary | ICD-10-CM

## 2021-10-18 DIAGNOSIS — R94.31 ABNORMAL EKG: ICD-10-CM

## 2021-10-18 LAB
BACTERIA UR QL AUTO: NORMAL /HPF
BILIRUB UR QL STRIP: NEGATIVE
CLARITY UR: CLEAR
COLOR UR: YELLOW
GLUCOSE UR STRIP-MCNC: NEGATIVE MG/DL
HGB UR QL STRIP.AUTO: NEGATIVE
HYALINE CASTS #/AREA URNS LPF: NORMAL /LPF
KETONES UR STRIP-MCNC: NEGATIVE MG/DL
LEUKOCYTE ESTERASE UR QL STRIP: NEGATIVE
NITRITE UR QL STRIP: NEGATIVE
NON-SQ EPI CELLS URNS QL MICRO: NORMAL /HPF
PH UR STRIP.AUTO: 7 [PH]
PROT UR STRIP-MCNC: NEGATIVE MG/DL
RBC #/AREA URNS AUTO: NORMAL /HPF
SP GR UR STRIP.AUTO: 1.01 (ref 1–1.03)
UROBILINOGEN UR QL STRIP.AUTO: 0.2 E.U./DL
WBC #/AREA URNS AUTO: NORMAL /HPF

## 2021-10-18 PROCEDURE — 81001 URINALYSIS AUTO W/SCOPE: CPT | Performed by: PHYSICIAN ASSISTANT

## 2021-10-18 PROCEDURE — 99214 OFFICE O/P EST MOD 30 MIN: CPT | Performed by: PHYSICIAN ASSISTANT

## 2021-10-18 PROCEDURE — 87086 URINE CULTURE/COLONY COUNT: CPT | Performed by: PHYSICIAN ASSISTANT

## 2021-10-18 PROCEDURE — 1160F RVW MEDS BY RX/DR IN RCRD: CPT | Performed by: PHYSICIAN ASSISTANT

## 2021-10-18 PROCEDURE — 1036F TOBACCO NON-USER: CPT | Performed by: PHYSICIAN ASSISTANT

## 2021-10-18 PROCEDURE — 1101F PT FALLS ASSESS-DOCD LE1/YR: CPT | Performed by: PHYSICIAN ASSISTANT

## 2021-10-18 PROCEDURE — 90471 IMMUNIZATION ADMIN: CPT

## 2021-10-18 PROCEDURE — 3288F FALL RISK ASSESSMENT DOCD: CPT | Performed by: PHYSICIAN ASSISTANT

## 2021-10-18 PROCEDURE — 90662 IIV NO PRSV INCREASED AG IM: CPT

## 2021-10-18 RX ORDER — PYRIDOXINE HCL (VITAMIN B6) 50 MG
50 TABLET ORAL DAILY
COMMUNITY

## 2021-10-18 RX ORDER — DOXAZOSIN 2 MG/1
2 TABLET ORAL DAILY
Qty: 90 TABLET | Refills: 1 | Status: SHIPPED | OUTPATIENT
Start: 2021-10-18 | End: 2021-11-16 | Stop reason: SDUPTHER

## 2021-10-19 LAB — BACTERIA UR CULT: NORMAL

## 2021-11-04 ENCOUNTER — OFFICE VISIT (OUTPATIENT)
Dept: FAMILY MEDICINE CLINIC | Facility: CLINIC | Age: 76
End: 2021-11-04
Payer: COMMERCIAL

## 2021-11-04 VITALS
DIASTOLIC BLOOD PRESSURE: 68 MMHG | OXYGEN SATURATION: 99 % | HEIGHT: 64 IN | WEIGHT: 119.2 LBS | SYSTOLIC BLOOD PRESSURE: 118 MMHG | TEMPERATURE: 97.6 F | HEART RATE: 51 BPM | BODY MASS INDEX: 20.35 KG/M2

## 2021-11-04 DIAGNOSIS — S51.811A SKIN TEAR OF RIGHT FOREARM WITHOUT COMPLICATION, INITIAL ENCOUNTER: ICD-10-CM

## 2021-11-04 DIAGNOSIS — M05.742 RHEUMATOID ARTHRITIS INVOLVING BOTH HANDS WITH POSITIVE RHEUMATOID FACTOR (HCC): ICD-10-CM

## 2021-11-04 DIAGNOSIS — S81.812A SKIN TEAR OF LEFT LOWER LEG WITHOUT COMPLICATION, INITIAL ENCOUNTER: ICD-10-CM

## 2021-11-04 DIAGNOSIS — M81.0 AGE-RELATED OSTEOPOROSIS WITHOUT CURRENT PATHOLOGICAL FRACTURE: ICD-10-CM

## 2021-11-04 DIAGNOSIS — R00.1 BRADYCARDIA: ICD-10-CM

## 2021-11-04 DIAGNOSIS — R12 HEART BURN: Primary | ICD-10-CM

## 2021-11-04 DIAGNOSIS — M05.741 RHEUMATOID ARTHRITIS INVOLVING BOTH HANDS WITH POSITIVE RHEUMATOID FACTOR (HCC): ICD-10-CM

## 2021-11-04 PROCEDURE — 99215 OFFICE O/P EST HI 40 MIN: CPT | Performed by: PHYSICIAN ASSISTANT

## 2021-11-04 RX ORDER — ALENDRONATE SODIUM 70 MG/1
70 TABLET ORAL
Qty: 12 TABLET | Refills: 0 | Status: SHIPPED | OUTPATIENT
Start: 2021-11-04 | End: 2022-01-25

## 2021-11-04 RX ORDER — OMEPRAZOLE 20 MG/1
20 CAPSULE, DELAYED RELEASE ORAL DAILY
Qty: 90 CAPSULE | Refills: 0 | Status: SHIPPED | OUTPATIENT
Start: 2021-11-04 | End: 2022-01-31

## 2021-11-16 ENCOUNTER — OFFICE VISIT (OUTPATIENT)
Dept: FAMILY MEDICINE CLINIC | Facility: CLINIC | Age: 76
End: 2021-11-16
Payer: COMMERCIAL

## 2021-11-16 VITALS
HEIGHT: 63 IN | BODY MASS INDEX: 20.7 KG/M2 | HEART RATE: 57 BPM | OXYGEN SATURATION: 99 % | DIASTOLIC BLOOD PRESSURE: 70 MMHG | TEMPERATURE: 97.1 F | WEIGHT: 116.8 LBS | SYSTOLIC BLOOD PRESSURE: 120 MMHG

## 2021-11-16 DIAGNOSIS — R14.0 ABDOMINAL DISTENSION: ICD-10-CM

## 2021-11-16 DIAGNOSIS — K62.89 RECTAL PAIN: Primary | ICD-10-CM

## 2021-11-16 DIAGNOSIS — R19.4 CHANGE IN BOWEL HABIT: ICD-10-CM

## 2021-11-16 DIAGNOSIS — R10.2 SUPRAPUBIC PAIN: ICD-10-CM

## 2021-11-16 DIAGNOSIS — K62.89 RECTAL IRRITATION: ICD-10-CM

## 2021-11-16 DIAGNOSIS — K64.9 HEMORRHOIDS, UNSPECIFIED HEMORRHOID TYPE: ICD-10-CM

## 2021-11-16 DIAGNOSIS — I10 BENIGN ESSENTIAL HYPERTENSION: ICD-10-CM

## 2021-11-16 DIAGNOSIS — R10.819 LOWER ABDOMINAL TENDERNESS: ICD-10-CM

## 2021-11-16 DIAGNOSIS — N81.10 VAGINAL WALL PROLAPSE: ICD-10-CM

## 2021-11-16 DIAGNOSIS — N39.3 STRESS INCONTINENCE OF URINE: ICD-10-CM

## 2021-11-16 DIAGNOSIS — R14.3 FLATULENCE SYMPTOM: ICD-10-CM

## 2021-11-16 PROCEDURE — 1160F RVW MEDS BY RX/DR IN RCRD: CPT | Performed by: PHYSICIAN ASSISTANT

## 2021-11-16 PROCEDURE — 1036F TOBACCO NON-USER: CPT | Performed by: PHYSICIAN ASSISTANT

## 2021-11-16 PROCEDURE — 99215 OFFICE O/P EST HI 40 MIN: CPT | Performed by: PHYSICIAN ASSISTANT

## 2021-11-16 RX ORDER — HYDROCORTISONE 25 MG/G
CREAM TOPICAL 2 TIMES DAILY
Qty: 28 G | Refills: 0 | Status: SHIPPED | OUTPATIENT
Start: 2021-11-16 | End: 2022-08-08 | Stop reason: SDUPTHER

## 2021-11-16 RX ORDER — DOXAZOSIN 2 MG/1
2 TABLET ORAL DAILY
Qty: 90 TABLET | Refills: 1 | Status: SHIPPED | OUTPATIENT
Start: 2021-11-16 | End: 2022-07-01

## 2021-12-20 ENCOUNTER — CONSULT (OUTPATIENT)
Dept: GASTROENTEROLOGY | Facility: MEDICAL CENTER | Age: 76
End: 2021-12-20
Payer: COMMERCIAL

## 2021-12-20 VITALS
TEMPERATURE: 98.3 F | DIASTOLIC BLOOD PRESSURE: 78 MMHG | HEART RATE: 76 BPM | BODY MASS INDEX: 21.43 KG/M2 | WEIGHT: 121 LBS | SYSTOLIC BLOOD PRESSURE: 134 MMHG

## 2021-12-20 DIAGNOSIS — K62.89 RECTAL PAIN: ICD-10-CM

## 2021-12-20 DIAGNOSIS — R19.4 CHANGE IN BOWEL HABIT: ICD-10-CM

## 2021-12-20 DIAGNOSIS — K64.9 HEMORRHOIDS, UNSPECIFIED HEMORRHOID TYPE: ICD-10-CM

## 2021-12-20 DIAGNOSIS — R14.3 FLATULENCE SYMPTOM: ICD-10-CM

## 2021-12-20 DIAGNOSIS — R14.0 ABDOMINAL DISTENSION: ICD-10-CM

## 2021-12-20 PROCEDURE — 1036F TOBACCO NON-USER: CPT | Performed by: STUDENT IN AN ORGANIZED HEALTH CARE EDUCATION/TRAINING PROGRAM

## 2021-12-20 PROCEDURE — 99204 OFFICE O/P NEW MOD 45 MIN: CPT | Performed by: STUDENT IN AN ORGANIZED HEALTH CARE EDUCATION/TRAINING PROGRAM

## 2022-01-31 DIAGNOSIS — R12 HEART BURN: ICD-10-CM

## 2022-01-31 RX ORDER — OMEPRAZOLE 20 MG/1
CAPSULE, DELAYED RELEASE ORAL
Qty: 90 CAPSULE | Refills: 0 | Status: SHIPPED | OUTPATIENT
Start: 2022-01-31 | End: 2022-05-10 | Stop reason: SDUPTHER

## 2022-02-04 ENCOUNTER — HOSPITAL ENCOUNTER (OUTPATIENT)
Dept: GASTROENTEROLOGY | Facility: HOSPITAL | Age: 77
Setting detail: OUTPATIENT SURGERY
Discharge: HOME | End: 2022-02-04
Payer: COMMERCIAL

## 2022-02-04 ENCOUNTER — ANESTHESIA (OUTPATIENT)
Dept: GASTROENTEROLOGY | Facility: HOSPITAL | Age: 77
End: 2022-02-04

## 2022-02-04 ENCOUNTER — ANESTHESIA EVENT (OUTPATIENT)
Dept: GASTROENTEROLOGY | Facility: HOSPITAL | Age: 77
End: 2022-02-04

## 2022-02-04 VITALS
HEIGHT: 63 IN | DIASTOLIC BLOOD PRESSURE: 78 MMHG | HEART RATE: 60 BPM | WEIGHT: 121 LBS | TEMPERATURE: 97.9 F | RESPIRATION RATE: 20 BRPM | BODY MASS INDEX: 21.44 KG/M2 | OXYGEN SATURATION: 100 % | SYSTOLIC BLOOD PRESSURE: 126 MMHG

## 2022-02-04 DIAGNOSIS — R19.4 CHANGE IN BOWEL HABIT: ICD-10-CM

## 2022-02-04 PROCEDURE — 45378 DIAGNOSTIC COLONOSCOPY: CPT | Performed by: STUDENT IN AN ORGANIZED HEALTH CARE EDUCATION/TRAINING PROGRAM

## 2022-02-04 RX ORDER — PROPOFOL 10 MG/ML
INJECTION, EMULSION INTRAVENOUS CONTINUOUS PRN
Status: DISCONTINUED | OUTPATIENT
Start: 2022-02-04 | End: 2022-02-05

## 2022-02-04 RX ORDER — SODIUM CHLORIDE, SODIUM LACTATE, POTASSIUM CHLORIDE, CALCIUM CHLORIDE 600; 310; 30; 20 MG/100ML; MG/100ML; MG/100ML; MG/100ML
125 INJECTION, SOLUTION INTRAVENOUS CONTINUOUS
Status: DISCONTINUED | OUTPATIENT
Start: 2022-02-04 | End: 2022-02-08 | Stop reason: HOSPADM

## 2022-02-04 RX ORDER — PROPOFOL 10 MG/ML
INJECTION, EMULSION INTRAVENOUS AS NEEDED
Status: DISCONTINUED | OUTPATIENT
Start: 2022-02-04 | End: 2022-02-05

## 2022-02-04 RX ORDER — LIDOCAINE HYDROCHLORIDE 20 MG/ML
INJECTION, SOLUTION EPIDURAL; INFILTRATION; INTRACAUDAL; PERINEURAL AS NEEDED
Status: DISCONTINUED | OUTPATIENT
Start: 2022-02-04 | End: 2022-02-05

## 2022-02-04 RX ADMIN — SODIUM CHLORIDE, SODIUM LACTATE, POTASSIUM CHLORIDE, AND CALCIUM CHLORIDE 125 ML/HR: .6; .31; .03; .02 INJECTION, SOLUTION INTRAVENOUS at 08:09

## 2022-02-04 RX ADMIN — LIDOCAINE HYDROCHLORIDE 50 MG: 20 INJECTION, SOLUTION EPIDURAL; INFILTRATION; INTRACAUDAL; PERINEURAL at 10:11

## 2022-02-04 RX ADMIN — PROPOFOL 130 MCG/KG/MIN: 10 INJECTION, EMULSION INTRAVENOUS at 10:11

## 2022-02-04 RX ADMIN — PROPOFOL 80 MG: 10 INJECTION, EMULSION INTRAVENOUS at 10:11

## 2022-02-04 NOTE — ANESTHESIA PREPROCEDURE EVALUATION
Procedure:  COLONOSCOPY    Relevant Problems   CARDIO   (+) Benign essential hypertension   (+) NSTEMI (non-ST elevated myocardial infarction) (HCC)      MUSCULOSKELETAL   (+) Rheumatoid arthritis involving both hands with positive rheumatoid factor (HCC)        Physical Exam    Airway    Mallampati score: II  TM Distance: >3 FB  Neck ROM: full     Dental       Cardiovascular  Cardiovascular exam normal    Pulmonary  Pulmonary exam normal     Other Findings        Anesthesia Plan  ASA Score- 3     Anesthesia Type- IV sedation with anesthesia with ASA Monitors  Additional Monitors:   Airway Plan:           Plan Factors-Exercise tolerance (METS): >4 METS  Chart reviewed  EKG reviewed  Imaging results reviewed  Existing labs reviewed  Patient summary reviewed  Induction- intravenous  Postoperative Plan-     Informed Consent- Anesthetic plan and risks discussed with patient  I personally reviewed this patient with the CRNA  Discussed and agreed on the Anesthesia Plan with the RIMMA Barboza

## 2022-02-04 NOTE — H&P
History and Physical - [facility]  Javier Cook 67 y o  female MRN: [id number]                  HPI: Javier Cook is a 67y o  year old female who presents for change in bowel habits      REVIEW OF SYSTEMS: Per the HPI, and otherwise unremarkable  Historical Information   History reviewed  No pertinent past medical history    Past Surgical History:   Procedure Laterality Date    APPENDECTOMY      CATARACT EXTRACTION      LAPAROSCOPIC APPENDECTOMY      incidental     NC INCISE FINGER TENDON SHEATH Left 2/26/2019    Procedure: LONG FINGER TRIGGER FINGER RELEASE;  Surgeon: Fran Blancas MD;  [facility]: [facility];  Service: Orthopedics    TONSILLECTOMY      TUBAL LIGATION       Social History   Social History     Substance and Sexual Activity   Alcohol Use Yes    Alcohol/week: 28 0 standard drinks    Types: 28 Glasses of wine per week     Social History     Substance and Sexual Activity   Drug Use No     Social History     Tobacco Use   Smoking Status Former Smoker    Packs/day: 1 00   Smokeless Tobacco Never Used   Tobacco Comment    age [age]      Family History   Problem Relation Age of Onset    Lung cancer Mother     Hypertension Mother     Cancer Mother     Cerebral aneurysm Father         bleed    Alcohol abuse Father     Cirrhosis Father     Other Father         had fallen   Austin Lemme Other Sister         unkownn cause     Pneumonia Brother         infancy of pneumonia    Other Sister         in MVA       Meds/Allergies       Current Outpatient Medications:     Cholecalciferol (VITAMIN D3) 50 MCG (2000 UT) TABS    doxazosin (CARDURA) 2 mg tablet    hydrocortisone (ANUSOL-HC) 2 5 % rectal cream    omeprazole (PriLOSEC) 20 mg delayed release capsule    vitamin B-12 (CYANOCOBALAMIN) 100 MCG TABS    VITAMIN D PO    alendronate (FOSAMAX) 70 mg tablet    cetirizine (ZYRTEC ALLERGY) 10 mg tablet    fluticasone (FLONASE) 50 mcg/act nasal spray    Current Facility-Administered Medications:     lactated ringers infusion, 125 mL/hr, Intravenous, Continuous, 125 mL/hr at 02/04/22 0809    Allergies   Allergen Reactions    Cat Hair Extract     Dust Mite Extract     Iv Dye  [Iodinated Diagnostic Agents] Hives     Category: Allergy;     Other      Possible reaction to chlorhexadine, but this occurred days after and in other areas of the body than just the area prepped    Pollen Extract      Seasonal allergies       Objective     BP (!) 186/74   Pulse 58   Temp 98 °F (36 7 °C) (Temporal)   Resp 16   Ht 5' 3" (1 6 m)   Wt 54 9 kg (121 lb)   SpO2 99%   BMI 21 43 kg/m²       PHYSICAL EXAM    Gen: NAD  Head: NCAT  CV: RRR  CHEST: Clear  ABD: soft, NT/ND  EXT: no edema      ASSESSMENT/PLAN:  This is a 67y o  year old female here for colonoscopy, and she is stable and optimized for her procedure

## 2022-05-10 ENCOUNTER — TELEPHONE (OUTPATIENT)
Dept: FAMILY MEDICINE CLINIC | Facility: CLINIC | Age: 77
End: 2022-05-10

## 2022-05-10 ENCOUNTER — HOSPITAL ENCOUNTER (INPATIENT)
Facility: HOSPITAL | Age: 77
LOS: 2 days | Discharge: HOME | DRG: 603 | End: 2022-05-12
Attending: EMERGENCY MEDICINE | Admitting: INTERNAL MEDICINE
Payer: COMMERCIAL

## 2022-05-10 ENCOUNTER — OFFICE VISIT (OUTPATIENT)
Dept: FAMILY MEDICINE CLINIC | Facility: CLINIC | Age: 77
End: 2022-05-10
Payer: COMMERCIAL

## 2022-05-10 VITALS
WEIGHT: 123.4 LBS | TEMPERATURE: 97.4 F | DIASTOLIC BLOOD PRESSURE: 58 MMHG | HEART RATE: 57 BPM | SYSTOLIC BLOOD PRESSURE: 110 MMHG | BODY MASS INDEX: 21.07 KG/M2 | HEIGHT: 64 IN | OXYGEN SATURATION: 99 %

## 2022-05-10 DIAGNOSIS — L03.113 CELLULITIS OF RIGHT UPPER EXTREMITY: Primary | ICD-10-CM
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
## 2022-05-10 DIAGNOSIS — K21.9 GASTROESOPHAGEAL REFLUX DISEASE, UNSPECIFIED WHETHER ESOPHAGITIS PRESENT: ICD-10-CM

## 2022-05-10 DIAGNOSIS — S60.511A CAT SCRATCH OF RIGHT HAND, INITIAL ENCOUNTER: ICD-10-CM

## 2022-05-10 DIAGNOSIS — S61.459A CAT BITE OF HAND: ICD-10-CM

## 2022-05-10 DIAGNOSIS — M81.0 AGE-RELATED OSTEOPOROSIS WITHOUT CURRENT PATHOLOGICAL FRACTURE: ICD-10-CM
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
## 2022-05-10 DIAGNOSIS — L03.113 CELLULITIS OF RIGHT UPPER EXTREMITY: ICD-10-CM

## 2022-05-10 DIAGNOSIS — L03.113 CELLULITIS OF HAND, RIGHT: Primary | ICD-10-CM

## 2022-05-10 DIAGNOSIS — S60.511A CAT SCRATCH OF RIGHT HAND, INITIAL ENCOUNTER: Primary | ICD-10-CM

## 2022-05-10 LAB
ALBUMIN SERPL BCP-MCNC: 4.3 G/DL (ref 3.5–5)
ALP SERPL-CCNC: 113 U/L (ref 34–104)
ALT SERPL W P-5'-P-CCNC: 8 U/L (ref 7–52)
ANION GAP SERPL CALCULATED.3IONS-SCNC: 11 MMOL/L (ref 4–13)
AST SERPL W P-5'-P-CCNC: 11 U/L (ref 13–39)
BASOPHILS # BLD AUTO: 0.02 THOUSANDS/ΜL (ref 0–0.1)
BASOPHILS NFR BLD AUTO: 0 % (ref 0–1)
BILIRUB SERPL-MCNC: 0.46 MG/DL (ref 0.2–1)
BUN SERPL-MCNC: 22 MG/DL (ref 5–25)
CALCIUM SERPL-MCNC: 9.5 MG/DL (ref 8.4–10.2)
CHLORIDE SERPL-SCNC: 104 MMOL/L (ref 96–108)
CO2 SERPL-SCNC: 23 MMOL/L (ref 21–32)
CREAT SERPL-MCNC: 1.32 MG/DL (ref 0.6–1.3)
EOSINOPHIL # BLD AUTO: 0.03 THOUSAND/ΜL (ref 0–0.61)
EOSINOPHIL NFR BLD AUTO: 0 % (ref 0–6)
ERYTHROCYTE [DISTWIDTH] IN BLOOD BY AUTOMATED COUNT: 11.8 % (ref 11.6–15.1)
GFR SERPL CREATININE-BSD FRML MDRD: 38 ML/MIN/1.73SQ M
GLUCOSE SERPL-MCNC: 83 MG/DL (ref 65–140)
HCT VFR BLD AUTO: 37.3 % (ref 34.8–46.1)
HGB BLD-MCNC: 12.2 G/DL (ref 11.5–15.4)
IMM GRANULOCYTES # BLD AUTO: 0.03 THOUSAND/UL (ref 0–0.2)
IMM GRANULOCYTES NFR BLD AUTO: 0 % (ref 0–2)
LYMPHOCYTES # BLD AUTO: 0.97 THOUSANDS/ΜL (ref 0.6–4.47)
LYMPHOCYTES NFR BLD AUTO: 11 % (ref 14–44)
MCH RBC QN AUTO: 33.2 PG (ref 26.8–34.3)
MCHC RBC AUTO-ENTMCNC: 32.7 G/DL (ref 31.4–37.4)
MCV RBC AUTO: 101 FL (ref 82–98)
MONOCYTES # BLD AUTO: 0.63 THOUSAND/ΜL (ref 0.17–1.22)
MONOCYTES NFR BLD AUTO: 7 % (ref 4–12)
NEUTROPHILS # BLD AUTO: 7.36 THOUSANDS/ΜL (ref 1.85–7.62)
NEUTS SEG NFR BLD AUTO: 82 % (ref 43–75)
NRBC BLD AUTO-RTO: 0 /100 WBCS
PLATELET # BLD AUTO: 239 THOUSANDS/UL (ref 149–390)
PMV BLD AUTO: 10.1 FL (ref 8.9–12.7)
POTASSIUM SERPL-SCNC: 4.3 MMOL/L (ref 3.5–5.3)
PROT SERPL-MCNC: 7.4 G/DL (ref 6.4–8.4)
RBC # BLD AUTO: 3.68 MILLION/UL (ref 3.81–5.12)
SODIUM SERPL-SCNC: 138 MMOL/L (ref 135–147)
WBC # BLD AUTO: 9.04 THOUSAND/UL (ref 4.31–10.16)

## 2022-05-10 PROCEDURE — 1160F RVW MEDS BY RX/DR IN RCRD: CPT | Performed by: PHYSICIAN ASSISTANT

## 2022-05-10 PROCEDURE — 99284 EMERGENCY DEPT VISIT MOD MDM: CPT

## 2022-05-10 PROCEDURE — 85025 COMPLETE CBC W/AUTO DIFF WBC: CPT | Performed by: EMERGENCY MEDICINE

## 2022-05-10 PROCEDURE — 36415 COLL VENOUS BLD VENIPUNCTURE: CPT | Performed by: EMERGENCY MEDICINE

## 2022-05-10 PROCEDURE — 80053 COMPREHEN METABOLIC PANEL: CPT | Performed by: EMERGENCY MEDICINE

## 2022-05-10 PROCEDURE — 99220 PR INITIAL OBSERVATION CARE/DAY 70 MINUTES: CPT | Performed by: STUDENT IN AN ORGANIZED HEALTH CARE EDUCATION/TRAINING PROGRAM

## 2022-05-10 PROCEDURE — 99214 OFFICE O/P EST MOD 30 MIN: CPT | Performed by: PHYSICIAN ASSISTANT

## 2022-05-10 PROCEDURE — 90471 IMMUNIZATION ADMIN: CPT

## 2022-05-10 PROCEDURE — 90715 TDAP VACCINE 7 YRS/> IM: CPT | Performed by: EMERGENCY MEDICINE

## 2022-05-10 PROCEDURE — 1036F TOBACCO NON-USER: CPT | Performed by: PHYSICIAN ASSISTANT

## 2022-05-10 PROCEDURE — 99284 EMERGENCY DEPT VISIT MOD MDM: CPT | Performed by: EMERGENCY MEDICINE

## 2022-05-10 RX ORDER — ACETAMINOPHEN 325 MG/1
650 TABLET ORAL ONCE
Status: COMPLETED | OUTPATIENT
Start: 2022-05-10 | End: 2022-05-10

## 2022-05-10 RX ORDER — DOXAZOSIN 2 MG/1
2 TABLET ORAL DAILY
Status: DISCONTINUED | OUTPATIENT
Start: 2022-05-11 | End: 2022-05-12 | Stop reason: HOSPADM

## 2022-05-10 RX ORDER — OMEPRAZOLE 20 MG/1
20 CAPSULE, DELAYED RELEASE ORAL DAILY
Qty: 90 CAPSULE | Refills: 1 | Status: SHIPPED | OUTPATIENT
Start: 2022-05-10

## 2022-05-10 RX ORDER — ENOXAPARIN SODIUM 100 MG/ML
40 INJECTION SUBCUTANEOUS DAILY
Status: DISCONTINUED | OUTPATIENT
Start: 2022-05-11 | End: 2022-05-12 | Stop reason: HOSPADM

## 2022-05-10 RX ORDER — AMOXICILLIN AND CLAVULANATE POTASSIUM 875; 125 MG/1; MG/1
1 TABLET, FILM COATED ORAL EVERY 12 HOURS SCHEDULED
Qty: 20 TABLET | Refills: 0 | Status: SHIPPED | OUTPATIENT
Start: 2022-05-10 | End: 2022-05-20

## 2022-05-10 RX ORDER — ACETAMINOPHEN 325 MG/1
650 TABLET ORAL EVERY 6 HOURS PRN
Status: DISCONTINUED | OUTPATIENT
Start: 2022-05-10 | End: 2022-05-12 | Stop reason: HOSPADM

## 2022-05-10 RX ORDER — ONDANSETRON 2 MG/ML
4 INJECTION INTRAMUSCULAR; INTRAVENOUS EVERY 6 HOURS PRN
Status: DISCONTINUED | OUTPATIENT
Start: 2022-05-10 | End: 2022-05-12 | Stop reason: HOSPADM

## 2022-05-10 RX ORDER — MELATONIN
2000 DAILY
Status: DISCONTINUED | OUTPATIENT
Start: 2022-05-11 | End: 2022-05-12 | Stop reason: HOSPADM

## 2022-05-10 RX ORDER — PANTOPRAZOLE SODIUM 20 MG/1
20 TABLET, DELAYED RELEASE ORAL
Status: DISCONTINUED | OUTPATIENT
Start: 2022-05-11 | End: 2022-05-12 | Stop reason: HOSPADM

## 2022-05-10 RX ADMIN — ACETAMINOPHEN 650 MG: 325 TABLET ORAL at 16:29

## 2022-05-10 RX ADMIN — TETANUS TOXOID, REDUCED DIPHTHERIA TOXOID AND ACELLULAR PERTUSSIS VACCINE, ADSORBED 0.5 ML: 5; 2.5; 8; 8; 2.5 SUSPENSION INTRAMUSCULAR at 16:30

## 2022-05-10 RX ADMIN — AMPICILLIN SODIUM AND SULBACTAM SODIUM 1.5 G: 1; .5 INJECTION, POWDER, FOR SOLUTION INTRAMUSCULAR; INTRAVENOUS at 18:35

## 2022-05-10 NOTE — ED PROVIDER NOTES
History  Chief Complaint   Patient presents with    Cat Bite     pt was bit by her own cat in the right hand  pt reports increased swelling on monday and today  Patient presents for further evaluation of swelling, redness and pain associated with a cat bite to her dorsal right hand 2 days ago  She was cleaning stabbed off of her own cat when it better  The following evening her hand swelled significantly and she noticed redness across the dorsum, onto the fingers and extending onto her wrist   She states the cat's teeth were intact after the bite  Today, it extends further onto her volar forearm and the swelling has worsened  She saw her PCP who wanted to send her to the ED for admission but she initially refused  She was given Augmentin to take; she did take 1 dose  She denies any new systemic symptoms associated with the bite or infection  She is right-handed  She has decreased range of motion due to the swelling but not due to pain or weakness  She has no change in sensation  She has been trying to squeeze the fluid out of her hand through the wound  No recent travel or sick contacts  Denies f/c, HA, CP, SOB, abdominal pain, n/v/d  12 system ROS o/w negative  History provided by:  Patient and medical records  Cat Bite  Contact animal:  Cat  Time since incident:  2 days  Pain details:     Quality:  Dull    Severity:  Moderate    Timing:  Constant    Progression:  Worsening  Incident location:  Home  Provoked: provoked    Animal in possession: yes    Tetanus status:  Unknown  Relieved by:  Nothing  Worsened by:  Heat (Palpation)  Associated symptoms: swelling    Associated symptoms: no fever and no numbness        Prior to Admission Medications   Prescriptions Last Dose Informant Patient Reported? Taking?    Cholecalciferol (VITAMIN D3) 50 MCG (2000 UT) TABS  Self Yes No   Sig: Take 2,000 Units by mouth daily   VITAMIN D PO   Yes No   Sig: Take by mouth   amoxicillin-clavulanate (Augmentin) 875-125 mg per tablet   No No   Sig: Take 1 tablet by mouth every 12 (twelve) hours for 10 days   cetirizine (ZYRTEC ALLERGY) 10 mg tablet  Self Yes No   Sig: Take by mouth   doxazosin (CARDURA) 2 mg tablet   No No   Sig: Take 1 tablet (2 mg total) by mouth daily   fluticasone (FLONASE) 50 mcg/act nasal spray  Self Yes No   Sig: into each nostril   hydrocortisone (ANUSOL-HC) 2 5 % rectal cream   No No   Sig: Apply topically 2 (two) times a day   omeprazole (PriLOSEC) 20 mg delayed release capsule   No No   Sig: Take 1 capsule (20 mg total) by mouth daily   vitamin B-12 (CYANOCOBALAMIN) 100 MCG TABS   Yes No   Sig: Take 50 mcg by mouth daily      Facility-Administered Medications: None       No past medical history on file  Past Surgical History:   Procedure Laterality Date    APPENDECTOMY      CATARACT EXTRACTION      LAPAROSCOPIC APPENDECTOMY      incidental     MD INCISE FINGER TENDON SHEATH Left 2/26/2019    Procedure: LONG FINGER TRIGGER FINGER RELEASE;  Surgeon: Ernestina Álvarez MD;  [facility];  Service: Orthopedics    TONSILLECTOMY      TUBAL LIGATION         Family History   Problem Relation Age of Onset    Lung cancer Mother     Hypertension Mother     Cancer Mother     Cerebral aneurysm Father         bleed    Alcohol abuse Father     Cirrhosis Father     Other Father         had fallen   [place] Other Sister         unkownn cause     Pneumonia Brother         infancy of pneumonia    Other Sister         in MVA     I have reviewed and agree with the history as documented      E-Cigarette/Vaping    E-Cigarette Use Never User      E-Cigarette/Vaping Substances    Nicotine No     THC No     CBD No     Flavoring No     Other No     Unknown No      Social History     Tobacco Use    Smoking status: Former Smoker     Packs/day: 1 00    Smokeless tobacco: Never Used    Tobacco comment: age 13-35    Vaping Use    Vaping Use: Never used   Substance Use Topics    Alcohol use: Yes     Alcohol/week: 10 0 standard drinks     Types: 10 Glasses of wine per week    Drug use: No       Review of Systems   Constitutional: Negative for chills, diaphoresis and fever  HENT: Negative for rhinorrhea, sore throat and trouble swallowing  Respiratory: Negative for cough, chest tightness, shortness of breath and wheezing  Cardiovascular: Negative for chest pain, palpitations and leg swelling  Gastrointestinal: Negative for abdominal distention, abdominal pain, constipation, diarrhea, nausea and vomiting  Genitourinary: Negative for difficulty urinating, dysuria, flank pain, frequency, hematuria and urgency  Musculoskeletal: Negative for arthralgias, back pain, myalgias, neck pain and neck stiffness  Skin: Positive for color change (Redness of the right hand and forearm) and wound (Scabbed laceration on the dorsal right hand)  Negative for pallor  Neurological: Negative for dizziness, weakness, light-headedness, numbness and headaches  Hematological: Negative for adenopathy  Psychiatric/Behavioral: Negative for confusion  The patient is not nervous/anxious  All other systems reviewed and are negative  Physical Exam  Physical Exam  Vitals reviewed  Constitutional:       General: She is not in acute distress  Appearance: Normal appearance  She is well-developed  She is not ill-appearing, toxic-appearing or diaphoretic  HENT:      Head: Normocephalic  Right Ear: External ear normal       Left Ear: External ear normal       Nose: Nose normal       Mouth/Throat:      Pharynx: No oropharyngeal exudate  Eyes:      General: No scleral icterus  Conjunctiva/sclera: Conjunctivae normal       Pupils: Pupils are equal, round, and reactive to light  Cardiovascular:      Rate and Rhythm: Normal rate and regular rhythm  Heart sounds: No murmur heard  Pulmonary:      Effort: Pulmonary effort is normal       Breath sounds: Normal breath sounds     Abdominal: General: Bowel sounds are normal  There is no distension  Palpations: Abdomen is soft  Tenderness: There is no abdominal tenderness  Musculoskeletal:         General: Swelling (Right hand circumferentially, including the fingers and distal wrist) and signs of injury (1 cm, crusted laceration to the dorsal right hand without drainage the with significant surrounding boggy edema) present  No tenderness  Cervical back: Normal range of motion and neck supple  Right lower leg: No edema  Left lower leg: No edema  Lymphadenopathy:      Cervical: No cervical adenopathy  Skin:     General: Skin is warm and dry  Coloration: Skin is not pale  Findings: Erythema (Mild to moderate across the dorsal right hand, extending onto the distal wrist and proximally on the volar wrist and forearm with the beginning of streaking toward the volar elbow (see photos in Media)) present  No rash  Neurological:      General: No focal deficit present  Mental Status: She is alert and oriented to person, place, and time  Cranial Nerves: No cranial nerve deficit  Sensory: No sensory deficit  Motor: No abnormal muscle tone  Deep Tendon Reflexes: Reflexes are normal and symmetric  Psychiatric:         Mood and Affect: Mood normal          Behavior: Behavior normal          Thought Content:  Thought content normal          Vital Signs  ED Triage Vitals [05/10/22 1556]   Temperature Pulse Respirations BP SpO2   98 9 °F (37 2 °C) 77 18 -- 99 %      Temp Source Heart Rate Source Patient Position - Orthostatic VS BP Location FiO2 (%)   Temporal Monitor Lying Right arm --      Pain Score       9           Vitals:    05/10/22 1556   Pulse: 77   Patient Position - Orthostatic VS: Lying         Visual Acuity      ED Medications  Medications   tetanus-diphtheria-acellular pertussis (BOOSTRIX) IM injection 0 5 mL (has no administration in time range)   ampicillin-sulbactam (UNASYN) 1 5 g in sodium chloride 0 9 % 50 mL IVPB (has no administration in time range)   acetaminophen (TYLENOL) tablet 650 mg (has no administration in time range)       Diagnostic Studies  Results Reviewed     Procedure Component Value Units Date/Time    CBC and differential [[id number]]     Lab Status: No result Specimen: Blood     Comprehensive metabolic panel [[id number]]     Lab Status: No result Specimen: Blood                  No orders to display              Procedures  Procedures         ED Course                                             MDM  Number of Diagnoses or Management Options  Diagnosis management comments: DDx:  Cat bite w/surrounding cellulitis crossing two joint lines and proximal streaking  A/P: Will check admission labs, start antibiotics, admit  Amount and/or Complexity of Data Reviewed  Clinical lab tests: ordered and reviewed  Review and summarize past medical records: yes        Disposition  Final diagnoses:   None     ED Disposition     None      Follow-up Information    None         Patient's Medications   Discharge Prescriptions    No medications on file       No discharge procedures on file      PDMP Review       Value Time User    PDMP Reviewed  Yes 2/21/2020  2:47 PM Elizabeth Feliciano PA-C          ED Provider  Electronically Signed by           Nereida Fields DO  05/11/22 6353

## 2022-05-10 NOTE — ASSESSMENT & PLAN NOTE
As noted on prior DEXA scan on 11/2021  Ambulatory referral for endocrine given by primary, recommend to continue follow up  Unable to tolerate fosamax

## 2022-05-10 NOTE — ASSESSMENT & PLAN NOTE
Patient presented with right hand swelling, edema with recent cat bite last Saturday  Seen by PCP, referred to ED, took 1 dose of augmentin  Evaluated in the ED, started on unasyn  Received tetanus shot in ED  Continue IV unasyn 3gm q6 hours  Monitor BMP, CBC  If no improvement by tomorrow, can consider imaging and ortho evaluation

## 2022-05-10 NOTE — PROGRESS NOTES
Assessment/Plan:      Diagnoses and all orders for this visit:    Cellulitis of right upper extremity  -     amoxicillin-clavulanate (Augmentin) 875-125 mg per tablet; Take 1 tablet by mouth every 12 (twelve) hours for 10 days  - due to extent of cellulitis urged patient to proceed to ER, at first refusing so oral antibiotic prescribed however after further discussion was agreeable to proceed to ER today; she was unsure of which hospital she would drive to so transfer order was not able to be placed, will call back immediately after discussing with sig other     Cat scratch of right hand, initial encounter  -     amoxicillin-clavulanate (Augmentin) 875-125 mg per tablet; Take 1 tablet by mouth every 12 (twelve) hours for 10 days    Gastroesophageal reflux disease, unspecified whether esophagitis present  Comments:  worsened by fosamax, will continue to ppi and stop fosamax  Orders:  -     omeprazole (PriLOSEC) 20 mg delayed release capsule; Take 1 capsule (20 mg total) by mouth daily    Age-related osteoporosis without current pathological fracture  Comments:  refer to injectable treatment options, cannot tolerate fosamax due to GERD  Orders:  -     Ambulatory Referral to Endocrinology; Future          Subjective:     Patient ID: Roderick Reyes is a 67 y o  female  Chief Complaint   Patient presents with    Hand Injury     rt hand cat bite happened on 05/08/22     HPI   Patient is a 68 yo female who presents following a cat bite on Sunday  She was trying to clean sap off cat and bite mid of R dorsal hand  She has swelling, pain, redness through entire dosral hand, with erythema extending past wrist and up to half of inner forearm  She explains she tried squeezing on hand last night and did express discharge  No fevers  Patient required hospitalization and surgery 1 year ago at [facility] for the same   Patient at first was stating that she did not want to go to ER/hospital despite this recommendation so oral antibiotic was sent to pharmacy  However, patient later agreed to proceed to ER but was unsure of which hospital she was willing to go to and was not able to place a transfer order  She will call back to let us know after taking to sig other  Of note, patient explains that her heart burn was very bad while taking the fosamax, finished the course but ran out of prilosec and heart burn is really bad still  She is agreeable to injectable medications as alternative to tx osteoporosis  Review of Systems   Constitutional: Negative for fever  Musculoskeletal: Positive for arthralgias  Skin: Positive for color change and wound  Objective:  Vitals:    05/10/22 0930   BP: 110/58   Pulse: 57   Temp: (!) 97 4 °F (36 3 °C)   SpO2: 99%      Physical Exam  Constitutional:       General: She is not in acute distress  Appearance: Normal appearance  Skin:     General: Skin is warm and dry  Findings: Erythema (swelling and redness through entire R dorsal hand, extending down fingers and past wrist and streaking into mid forearm'; scab on mid R dorsal hand ) present  Neurological:      General: No focal deficit present  Mental Status: She is alert

## 2022-05-10 NOTE — H&P
[address]  H&P- Lion Torre 1945, 67 y o  female MRN: [id number]  Unit/Bed#: Z1 H1 Encounter: [id number]  Primary Care Provider: Leah Chacko PA-C   Date and time admitted to hospital: 5/10/2022  4:00 PM    * Cellulitis of right hand  Assessment & Plan  Patient presented with right hand swelling, edema with recent cat bite last Saturday  Seen by PCP, referred to ED, took 1 dose of augmentin  Evaluated in the ED, started on unasyn  Received tetanus shot in ED  Continue IV unasyn 3gm q6 hours  Monitor BMP, CBC  If no improvement by tomorrow, can consider imaging and ortho evaluation    Age related osteoporosis  Assessment & Plan  As noted on prior DEXA scan on 11/2021  Ambulatory referral for endocrine given by primary, recommend to continue follow up  Unable to tolerate fosamax    GERD (gastroesophageal reflux disease)  Assessment & Plan  Continue ppi    Benign essential hypertension  Assessment & Plan  Continue cardura      VTE Prophylaxis: Enoxaparin (Lovenox)  / sequential compression device   Code Status: FC  POLST: There is no POLST form on file for this patient (pre-hospital)  Discussion with family: patient,  bedside    Anticipated Length of Stay:  Patient will be admitted on an Inpatient basis with an anticipated length of stay of 2 midnights  Justification for Hospital Stay: IV abx    Total Time for Visit, including Counseling / Coordination of Care: 45 minutes  Greater than 50% of this total time spent on direct patient counseling and coordination of care  Chief Complaint:   Right Hand Swelling    History of Present Illness:    Lion Torre is a 67 y o  female who presents with swelling, redness in her right hand  Patient reportedly had a cat bite on Saturday last week with increasing swelling noted the following day  She reported that there was some pus and drainage that she was trying to squeeze out    She saw her PCP today who recommended to go to the ED for evaluation for antibiotics but patient hesitant and was prescribed Augmentin  She reportedly took 1 dose of Augmentin, with no significant improvement she presented to the ED for evaluation  She has had decreased range of motion in her right fingers, hand, and wrist due to swelling  She has denies any fevers, chills, chest pain, shortness of breath, nausea vomiting or diarrhea  Review of Systems:    Review of Systems   Constitutional: Positive for chills  Negative for activity change, appetite change and fever  HENT: Negative for congestion, facial swelling, sinus pain and sore throat  Respiratory: Negative for cough, shortness of breath and wheezing  Cardiovascular: Negative for chest pain, palpitations and leg swelling  Gastrointestinal: Negative for abdominal distention, abdominal pain, constipation, diarrhea, nausea and vomiting  Endocrine: Negative for cold intolerance, heat intolerance, polydipsia, polyphagia and polyuria  Genitourinary: Negative for dysuria, flank pain and urgency  Musculoskeletal: Positive for joint swelling  Skin: Negative for color change and pallor  Neurological: Negative for dizziness, syncope, weakness, light-headedness and headaches  Psychiatric/Behavioral: Negative for agitation, confusion and dysphoric mood  Past Medical and Surgical History:     No past medical history on file  Past Surgical History:   Procedure Laterality Date    APPENDECTOMY      CATARACT EXTRACTION      LAPAROSCOPIC APPENDECTOMY      incidental     MA INCISE FINGER TENDON SHEATH Left 2/26/2019    Procedure: LONG FINGER TRIGGER FINGER RELEASE;  Surgeon: Lucinda Vazquez MD;  Location: [facility];  Service: Orthopedics    TONSILLECTOMY      TUBAL LIGATION         Meds/Allergies:    Prior to Admission medications    Medication Sig Start Date End Date Taking?  Authorizing Provider   amoxicillin-clavulanate (Augmentin) 875-125 mg per tablet Take 1 tablet by mouth every 12 (twelve) hours for 10 days 5/10/22 5/20/22  [facility] Standard, ISAAK   cetirizine (ZYRTEC ALLERGY) 10 mg tablet Take by mouth    Historical Provider, MD   Cholecalciferol (VITAMIN D3) 50 MCG (2000 UT) TABS Take 2,000 Units by mouth daily    Historical Provider, MD   doxazosin (CARDURA) 2 mg tablet Take 1 tablet (2 mg total) by mouth daily 11/16/21   [facility] Standard, ISAAK   fluticasone [facility]) 50 mcg/act nasal spray into each nostril    Historical Provider, MD   hydrocortisone (ANUSOL-HC) 2 5 % rectal cream Apply topically 2 (two) times a day 11/16/21   [facility] Standard, ISAAK   omeprazole (PriLOSEC) 20 mg delayed release capsule Take 1 capsule (20 mg total) by mouth daily 5/10/22   [facility] Standard, ISAAK   vitamin B-12 (CYANOCOBALAMIN) 100 MCG TABS Take 50 mcg by mouth daily    Historical Provider, MD   VITAMIN D PO Take by mouth    Historical Provider, MD   alendronate (FOSAMAX) 70 mg tablet take 1 tablet by mouth every week  Patient not taking: Reported on 5/10/2022 1/25/22 5/10/22  [facility] Standard, ISAAK   omeprazole (PriLOSEC) 20 mg delayed release capsule take 1 capsule by mouth once daily 1/31/22 5/10/22  [facility] StandardISAAK     I have reviewed home medications with patient personally  Allergies: Allergies   Allergen Reactions    Cat Hair Extract     Dust Mite Extract     Iv Dye  [Iodinated Diagnostic Agents] Hives     Category:  Allergy;     Other      Possible reaction to chlorhexadine, but this occurred days after and in other areas of the body than just the area prepped    Pollen Extract      Seasonal allergies       Social History:     Marital Status: /Civil Union   Occupation: Retired  Patient Pre-hospital Living Situation: home  Patient Pre-hospital Level of Mobility: ambulatory  Patient Pre-hospital Diet Restrictions: none  Substance Use History:   Social History     Substance and Sexual Activity   Alcohol Use Yes    Alcohol/week: 10 0 standard drinks    Types: 10 Glasses of wine per week     Social History     Tobacco Use   Smoking Status Former Smoker    Packs/day: 1 00   Smokeless Tobacco Never Used   Tobacco Comment    age [age]      Social History     Substance and Sexual Activity   Drug Use No       Family History:    Family History   Problem Relation Age of Onset    Lung cancer Mother     Hypertension Mother     Cancer Mother     Cerebral aneurysm Father         bleed    Alcohol abuse Father     Cirrhosis Father     Other Father         had fallen   Beatriz Wang Other Sister         unkownn cause     Pneumonia Brother         infancy of pneumonia    Other Sister         in MVA       Physical Exam:     Vitals:   Pulse: 77 (05/10/22 1556)  Temperature: 98 9 °F (37 2 °C) (05/10/22 1556)  Temp Source: Temporal (05/10/22 1556)  Respirations: 18 (05/10/22 1556)  Height: 5' 3" (160 cm) (05/10/22 1556)  Weight - Scale: 55 8 kg (123 lb) (05/10/22 1556)  SpO2: 99 % (05/10/22 1559)    Physical Exam  Vitals and nursing note reviewed  Constitutional:       General: She is not in acute distress  Appearance: Normal appearance  She is normal weight  HENT:      Head: Normocephalic and atraumatic  Eyes:      General: No scleral icterus  Conjunctiva/sclera: Conjunctivae normal    Cardiovascular:      Rate and Rhythm: Normal rate and regular rhythm  Heart sounds: Normal heart sounds  Pulmonary:      Breath sounds: Normal breath sounds  No wheezing or rhonchi  Abdominal:      General: Bowel sounds are normal  There is no distension  Palpations: Abdomen is soft  Musculoskeletal:         General: Swelling and tenderness present  Comments: Right hand swelling, erythema, limited mobility at the wrist and unable to fully abduct digits   Skin:     General: Skin is warm and dry  Neurological:      General: No focal deficit present  Mental Status: She is alert and oriented to person, place, and time  Mental status is at baseline         Additional Data: Lab Results: I have personally reviewed pertinent reports  Results from last 7 days   Lab Units 05/10/22  1623   WBC Thousand/uL 9 04   HEMOGLOBIN g/dL 12 2   HEMATOCRIT % 37 3   PLATELETS Thousands/uL 239   NEUTROS PCT % 82*   LYMPHS PCT % 11*   MONOS PCT % 7   EOS PCT % 0     Results from last 7 days   Lab Units 05/10/22  1623   SODIUM mmol/L 138   POTASSIUM mmol/L 4 3   CHLORIDE mmol/L 104   CO2 mmol/L 23   BUN mg/dL 22   CREATININE mg/dL 1 32*   ANION GAP mmol/L 11   CALCIUM mg/dL 9 5   ALBUMIN g/dL 4 3   TOTAL BILIRUBIN mg/dL 0 46   ALK PHOS U/L 113*   ALT U/L 8   AST U/L 11*   GLUCOSE RANDOM mg/dL 83                       Imaging: I have personally reviewed pertinent reports  No orders to display       EKG, Pathology, and Other Studies Reviewed on Admission:   · EKG: none done    Allscripts / Epic Records Reviewed: Yes     ** Please Note: This note has been constructed using a voice recognition system   **

## 2022-05-11 LAB
ANION GAP SERPL CALCULATED.3IONS-SCNC: 9 MMOL/L (ref 4–13)
BUN SERPL-MCNC: 16 MG/DL (ref 5–25)
CALCIUM SERPL-MCNC: 8.9 MG/DL (ref 8.4–10.2)
CHLORIDE SERPL-SCNC: 103 MMOL/L (ref 96–108)
CO2 SERPL-SCNC: 23 MMOL/L (ref 21–32)
CREAT SERPL-MCNC: 1.2 MG/DL (ref 0.6–1.3)
ERYTHROCYTE [DISTWIDTH] IN BLOOD BY AUTOMATED COUNT: 11.8 % (ref 11.6–15.1)
GFR SERPL CREATININE-BSD FRML MDRD: 43 ML/MIN/1.73SQ M
GLUCOSE SERPL-MCNC: 105 MG/DL (ref 65–140)
HCT VFR BLD AUTO: 34 % (ref 34.8–46.1)
HGB BLD-MCNC: 11 G/DL (ref 11.5–15.4)
MCH RBC QN AUTO: 32.6 PG (ref 26.8–34.3)
MCHC RBC AUTO-ENTMCNC: 32.4 G/DL (ref 31.4–37.4)
MCV RBC AUTO: 101 FL (ref 82–98)
PLATELET # BLD AUTO: 240 THOUSANDS/UL (ref 149–390)
PMV BLD AUTO: 10.3 FL (ref 8.9–12.7)
POTASSIUM SERPL-SCNC: 4.2 MMOL/L (ref 3.5–5.3)
RBC # BLD AUTO: 3.37 MILLION/UL (ref 3.81–5.12)
SODIUM SERPL-SCNC: 135 MMOL/L (ref 135–147)
WBC # BLD AUTO: 6.1 THOUSAND/UL (ref 4.31–10.16)

## 2022-05-11 PROCEDURE — 80048 BASIC METABOLIC PNL TOTAL CA: CPT | Performed by: STUDENT IN AN ORGANIZED HEALTH CARE EDUCATION/TRAINING PROGRAM

## 2022-05-11 PROCEDURE — 85027 COMPLETE CBC AUTOMATED: CPT | Performed by: STUDENT IN AN ORGANIZED HEALTH CARE EDUCATION/TRAINING PROGRAM

## 2022-05-11 PROCEDURE — 36415 COLL VENOUS BLD VENIPUNCTURE: CPT | Performed by: STUDENT IN AN ORGANIZED HEALTH CARE EDUCATION/TRAINING PROGRAM

## 2022-05-11 PROCEDURE — 99225 PR SBSQ OBSERVATION CARE/DAY 25 MINUTES: CPT | Performed by: STUDENT IN AN ORGANIZED HEALTH CARE EDUCATION/TRAINING PROGRAM

## 2022-05-11 RX ORDER — DIPHENOXYLATE HYDROCHLORIDE AND ATROPINE SULFATE 2.5; .025 MG/1; MG/1
1 TABLET ORAL DAILY
COMMUNITY

## 2022-05-11 RX ORDER — OXYCODONE HYDROCHLORIDE 5 MG/1
2.5 TABLET ORAL EVERY 6 HOURS PRN
Status: DISCONTINUED | OUTPATIENT
Start: 2022-05-11 | End: 2022-05-12 | Stop reason: HOSPADM

## 2022-05-11 RX ORDER — DIPHENHYDRAMINE HCL 25 MG
25 TABLET ORAL ONCE
Status: COMPLETED | OUTPATIENT
Start: 2022-05-11 | End: 2022-05-11

## 2022-05-11 RX ORDER — IBUPROFEN 200 MG
TABLET ORAL EVERY 6 HOURS PRN
COMMUNITY

## 2022-05-11 RX ORDER — ACETAMINOPHEN 325 MG/1
650 TABLET ORAL EVERY 6 HOURS PRN
COMMUNITY

## 2022-05-11 RX ADMIN — SODIUM CHLORIDE 1.5 G: 9 INJECTION, SOLUTION INTRAVENOUS at 13:31

## 2022-05-11 RX ADMIN — SODIUM CHLORIDE 1.5 G: 9 INJECTION, SOLUTION INTRAVENOUS at 19:33

## 2022-05-11 RX ADMIN — SODIUM CHLORIDE 1.5 G: 9 INJECTION, SOLUTION INTRAVENOUS at 08:24

## 2022-05-11 RX ADMIN — DIPHENHYDRAMINE HCL 25 MG: 25 TABLET ORAL at 10:17

## 2022-05-11 RX ADMIN — OXYCODONE HYDROCHLORIDE 2.5 MG: 5 TABLET ORAL at 18:00

## 2022-05-11 RX ADMIN — DOXAZOSIN 2 MG: 2 TABLET ORAL at 08:21

## 2022-05-11 RX ADMIN — SODIUM CHLORIDE 1.5 G: 9 INJECTION, SOLUTION INTRAVENOUS at 02:12

## 2022-05-11 RX ADMIN — ENOXAPARIN SODIUM 40 MG: 100 INJECTION SUBCUTANEOUS at 08:23

## 2022-05-11 RX ADMIN — PANTOPRAZOLE SODIUM 20 MG: 20 TABLET, DELAYED RELEASE ORAL at 06:14

## 2022-05-11 RX ADMIN — Medication 2000 UNITS: at 08:21

## 2022-05-11 NOTE — PROGRESS NOTES
[address]  Progress Note - Mao Millan 1945, 67 y o  female MRN: [id number]  Unit/Bed#: ED 06 Encounter: [id number]  Primary Care Provider: Hua Alvarez PA-C   Date and time admitted to hospital: 5/10/2022  4:00 PM    * Cellulitis of right hand  Assessment & Plan  Patient presented with right hand swelling, edema with recent cat bite last Saturday  Seen by PCP, referred to ED, took 1 dose of augmentin  Evaluated in the ED, started on unasyn  Received tetanus shot in ED  Continue IV unasyn 3gm q6 hours    Swelling has improved, leukocytosis improving, will require IV abx for additional 24 hours  On discharge, will likely need augmentin for 10 day course    Age related osteoporosis  Assessment & Plan  As noted on prior DEXA scan on 2021  Ambulatory referral for endocrine given by primary, recommend to continue follow up  Unable to tolerate fosamax    GERD (gastroesophageal reflux disease)  Assessment & Plan  Continue ppi    Benign essential hypertension  Assessment & Plan  Continue cardura      VTE Pharmacologic Prophylaxis:   Pharmacologic: Enoxaparin (Lovenox)  Mechanical VTE Prophylaxis in Place: Yes    Patient Centered Rounds: I have performed bedside rounds with nursing staff today  Discussions with Specialists or Other Care Team Provider: none    Education and Discussions with Family / Patient: patient    Time Spent for Care: 30 minutes  More than 50% of total time spent on counseling and coordination of care as described above  Current Length of Stay: 1 day(s)    Current Patient Status: Inpatient   Certification Statement: The patient will continue to require additional inpatient hospital stay due to IV abx    Discharge Plan: 24 hours    Code Status: Level 1 - Full Code      Subjective:   No events overnight  Denies any fevers, chills, nausea or vomiting  Tolerating diet  Pain currently controlled on tylenol      Objective:     Vitals:   Temp (24hrs), Av 5 °F (36 9 °C), Min:98 °F (36 7 °C), Max:98 9 °F (37 2 °C)    Temp:  [98 °F (36 7 °C)-98 9 °F (37 2 °C)] 98 °F (36 7 °C)  HR:  [68-77] 68  Resp:  [17-18] 17  BP: (132-141)/(62-92) 132/62  SpO2:  [98 %-99 %] 98 %  Body mass index is 21 79 kg/m²  Input and Output Summary (last 24 hours): Intake/Output Summary (Last 24 hours) at 5/11/2022 1108  Last data filed at 5/11/2022 0854  Gross per 24 hour   Intake 150 ml   Output --   Net 150 ml       Physical Exam:     Physical Exam  Vitals and nursing note reviewed  Constitutional:       General: She is not in acute distress  Appearance: Normal appearance  She is normal weight  HENT:      Head: Normocephalic and atraumatic  Eyes:      General: No scleral icterus  Conjunctiva/sclera: Conjunctivae normal    Cardiovascular:      Rate and Rhythm: Normal rate and regular rhythm  Heart sounds: Normal heart sounds  Pulmonary:      Breath sounds: Normal breath sounds  No wheezing or rhonchi  Abdominal:      General: Bowel sounds are normal  There is no distension  Palpations: Abdomen is soft  Musculoskeletal:         General: Swelling and tenderness present  Comments: Right hand swelling, erythema  Swelling appears to have improved from day prior, increased mobility and less tender   Skin:     General: Skin is warm and dry  Neurological:      General: No focal deficit present  Mental Status: She is alert and oriented to person, place, and time  Mental status is at baseline         Additional Data:     Labs:    Results from last 7 days   Lab Units 05/11/22  0616 05/10/22  1623   WBC Thousand/uL 6 10 9 04   HEMOGLOBIN g/dL 11 0* 12 2   HEMATOCRIT % 34 0* 37 3   PLATELETS Thousands/uL 240 239   NEUTROS PCT %  --  82*   LYMPHS PCT %  --  11*   MONOS PCT %  --  7   EOS PCT %  --  0     Results from last 7 days   Lab Units 05/11/22  0616 05/10/22  1623   SODIUM mmol/L 135 138   POTASSIUM mmol/L 4 2 4 3   CHLORIDE mmol/L 103 104   CO2 mmol/L 23 23   BUN mg/dL 16 22   CREATININE mg/dL 1 20 1 32*   ANION GAP mmol/L 9 11   CALCIUM mg/dL 8 9 9 5   ALBUMIN g/dL  --  4 3   TOTAL BILIRUBIN mg/dL  --  0 46   ALK PHOS U/L  --  113*   ALT U/L  --  8   AST U/L  --  11*   GLUCOSE RANDOM mg/dL 105 83                           * I Have Reviewed All Lab Data Listed Above  * Additional Pertinent Lab Tests Reviewed: [address] Admission Reviewed    Imaging:    No results found  Recent Cultures (last 7 days):           Last 24 Hours Medication List:   Current Facility-Administered Medications   Medication Dose Route Frequency Provider Last Rate    acetaminophen  650 mg Oral Q6H PRN Stella LineMD stan      ampicillin-sulbactam  1 5 g Intravenous Q6H Stella LineMD stan Stopped (05/11/22 3909)    cholecalciferol  2,000 Units Oral Daily Stella LineMD stan      doxazosin  2 mg Oral Daily Stella LineMD stan      enoxaparin  40 mg Subcutaneous Daily Stella LineMD stan      ondansetron  4 mg Intravenous Q6H PRN Etienneayne LineMD stan      oxyCODONE  2 5 mg Oral Q6H PRN Larayne Liner, MD      pantoprazole  20 mg Oral Early Morning Stella LineMD stan          Today, Patient Was Seen By: Stella Cheng MD    ** Please Note: Dictation voice to text software may have been used in the creation of this document   **

## 2022-05-11 NOTE — PLAN OF CARE
Problem: Potential for Falls  Goal: Patient will remain free of falls  Description: INTERVENTIONS:  - Educate patient/family on patient safety including physical limitations  - Instruct patient to call for assistance with activity   - Consult OT/PT to assist with strengthening/mobility   - Keep Call bell within reach  - Keep bed low and locked with side rails adjusted as appropriate  - Keep care items and personal belongings within reach  - Initiate and maintain comfort rounds  - Make Fall Risk Sign visible to staff  - Offer Toileting every 2 Hours, in advance of need  - Initiate/Maintain bed alarm  - Obtain necessary fall risk management equipment: non slip socks  - Apply yellow socks and bracelet for high fall risk patients  - Consider moving patient to room near nurses station  Outcome: Progressing     Problem: Nutrition/Hydration-ADULT  Goal: Nutrient/Hydration intake appropriate for improving, restoring or maintaining nutritional needs  Description: Monitor and assess patient's nutrition/hydration status for malnutrition  Collaborate with interdisciplinary team and initiate plan and interventions as ordered  Monitor patient's weight and dietary intake as ordered or per policy  Utilize nutrition screening tool and intervene as necessary  Determine patient's food preferences and provide high-protein, high-caloric foods as appropriate       INTERVENTIONS:  - Monitor oral intake, urinary output, labs, and treatment plans  - Assess nutrition and hydration status and recommend course of action  - Evaluate amount of meals eaten  - Assist patient with eating if necessary   - Allow adequate time for meals  - Recommend/ encourage appropriate diets, oral nutritional supplements, and vitamin/mineral supplements  - Order, calculate, and assess calorie counts as needed  - Recommend, monitor, and adjust tube feedings and TPN/PPN based on assessed needs  - Assess need for intravenous fluids  - Provide specific nutrition/hydration education as appropriate  - Include patient/family/caregiver in decisions related to nutrition  Outcome: Progressing     Problem: PAIN - ADULT  Goal: Verbalizes/displays adequate comfort level or baseline comfort level  Description: Interventions:  - Encourage patient to monitor pain and request assistance  - Assess pain using appropriate pain scale  - Administer analgesics based on type and severity of pain and evaluate response  - Implement non-pharmacological measures as appropriate and evaluate response  - Consider cultural and social influences on pain and pain management  - Notify physician/advanced practitioner if interventions unsuccessful or patient reports new pain  Outcome: Progressing     Problem: INFECTION - ADULT  Goal: Absence or prevention of progression during hospitalization  Description: INTERVENTIONS:  - Assess and monitor for signs and symptoms of infection  - Monitor lab/diagnostic results  - Monitor all insertion sites, i e  indwelling lines, tubes, and drains  - Monitor endotracheal if appropriate and nasal secretions for changes in amount and color  - Finley appropriate cooling/warming therapies per order  - Administer medications as ordered  - Instruct and encourage patient and family to use good hand hygiene technique  - Identify and instruct in appropriate isolation precautions for identified infection/condition  Outcome: Progressing  Goal: Absence of fever/infection during neutropenic period  Description: INTERVENTIONS:  - Monitor WBC    Outcome: Progressing     Problem: SAFETY ADULT  Goal: Patient will remain free of falls  Description: INTERVENTIONS:  - Educate patient/family on patient safety including physical limitations  - Instruct patient to call for assistance with activity   - Consult OT/PT to assist with strengthening/mobility   - Keep Call bell within reach  - Keep bed low and locked with side rails adjusted as appropriate  - Keep care items and personal belongings within reach  - Initiate and maintain comfort rounds  - Make Fall Risk Sign visible to staff  - Offer Toileting every 2 Hours, in advance of need  - Initiate/Maintain  bed alarm  - Obtain necessary fall risk management equipment: non slip socks  - Apply yellow socks and bracelet for high fall risk patients  - Consider moving patient to room near nurses station  Outcome: Progressing  Goal: Maintain or return to baseline ADL function  Description: INTERVENTIONS:  -  Assess patient's ability to carry out ADLs; assess patient's baseline for ADL function and identify physical deficits which impact ability to perform ADLs (bathing, care of mouth/teeth, toileting, grooming, dressing, etc )  - Assess/evaluate cause of self-care deficits   - Assess range of motion  - Assess patient's mobility; develop plan if impaired  - Assess patient's need for assistive devices and provide as appropriate  - Encourage maximum independence but intervene and supervise when necessary  - Involve family in performance of ADLs  - Assess for home care needs following discharge   - Consider OT consult to assist with ADL evaluation and planning for discharge  - Provide patient education as appropriate  Outcome: Progressing  Goal: Maintains/Returns to pre admission functional level  Description: INTERVENTIONS:  - Perform BMAT or MOVE assessment daily    - Set and communicate daily mobility goal to care team and patient/family/caregiver  - Collaborate with rehabilitation services on mobility goals if consulted  - Perform Range of Motion 2 times a day  - Reposition patient every 2 hours    - Dangle patient 3 times a day  - Stand patient 3 times a day  - Ambulate patient 3 times a day  - Out of bed to chair 3 times a day   - Out of bed for meals 3 times a day  - Out of bed for toileting  - Record patient progress and toleration of activity level   Outcome: Progressing     Problem: DISCHARGE PLANNING  Goal: Discharge to home or other facility with appropriate resources  Description: INTERVENTIONS:  - Identify barriers to discharge w/patient and caregiver  - Arrange for needed discharge resources and transportation as appropriate  - Identify discharge learning needs (meds, wound care, etc )  - Arrange for interpretive services to assist at discharge as needed  - Refer to Case Management Department for coordinating discharge planning if the patient needs post-hospital services based on physician/advanced practitioner order or complex needs related to functional status, cognitive ability, or social support system  Outcome: Progressing     Problem: Knowledge Deficit  Goal: Patient/family/caregiver demonstrates understanding of disease process, treatment plan, medications, and discharge instructions  Description: Complete learning assessment and assess knowledge base    Interventions:  - Provide teaching at level of understanding  - Provide teaching via preferred learning methods  Outcome: Progressing     Problem: CARDIOVASCULAR - ADULT  Goal: Maintains optimal cardiac output and hemodynamic stability  Description: INTERVENTIONS:  - Monitor I/O, vital signs and rhythm  - Monitor for S/S and trends of decreased cardiac output  - Administer and titrate ordered vasoactive medications to optimize hemodynamic stability  - Assess quality of pulses, skin color and temperature  - Assess for signs of decreased coronary artery perfusion  - Instruct patient to report change in severity of symptoms  Outcome: Progressing  Goal: Absence of cardiac dysrhythmias or at baseline rhythm  Description: INTERVENTIONS:  - Continuous cardiac monitoring, vital signs, obtain 12 lead EKG if ordered  - Administer antiarrhythmic and heart rate control medications as ordered  - Monitor electrolytes and administer replacement therapy as ordered  Outcome: Progressing     Problem: RESPIRATORY - ADULT  Goal: Achieves optimal ventilation and oxygenation  Description: INTERVENTIONS:  - Assess for changes in respiratory status  - Assess for changes in mentation and behavior  - Position to facilitate oxygenation and minimize respiratory effort  - Oxygen administered by appropriate delivery if ordered  - Initiate smoking cessation education as indicated  - Encourage broncho-pulmonary hygiene including cough, deep breathe, Incentive Spirometry  - Assess the need for suctioning and aspirate as needed  - Assess and instruct to report SOB or any respiratory difficulty  - Respiratory Therapy support as indicated  Outcome: Progressing     Problem: GASTROINTESTINAL - ADULT  Goal: Minimal or absence of nausea and/or vomiting  Description: INTERVENTIONS:  - Administer IV fluids if ordered to ensure adequate hydration  - Maintain NPO status until nausea and vomiting are resolved  - Nasogastric tube if ordered  - Administer ordered antiemetic medications as needed  - Provide nonpharmacologic comfort measures as appropriate  - Advance diet as tolerated, if ordered  - Consider nutrition services referral to assist patient with adequate nutrition and appropriate food choices  Outcome: Progressing  Goal: Maintains or returns to baseline bowel function  Description: INTERVENTIONS:  - Assess bowel function  - Encourage oral fluids to ensure adequate hydration  - Administer IV fluids if ordered to ensure adequate hydration  - Administer ordered medications as needed  - Encourage mobilization and activity  - Consider nutritional services referral to assist patient with adequate nutrition and appropriate food choices  Outcome: Progressing  Goal: Maintains adequate nutritional intake  Description: INTERVENTIONS:  - Monitor percentage of each meal consumed  - Identify factors contributing to decreased intake, treat as appropriate  - Assist with meals as needed  - Monitor I&O, weight, and lab values if indicated  - Obtain nutrition services referral as needed  Outcome: Progressing     Problem: GENITOURINARY - ADULT  Goal: Maintains or returns to baseline urinary function  Description: INTERVENTIONS:  - Assess urinary function  - Encourage oral fluids to ensure adequate hydration if ordered  - Administer IV fluids as ordered to ensure adequate hydration  - Administer ordered medications as needed  - Offer frequent toileting  - Follow urinary retention protocol if ordered  Outcome: Progressing     Problem: METABOLIC, FLUID AND ELECTROLYTES - ADULT  Goal: Electrolytes maintained within normal limits  Description: INTERVENTIONS:  - Monitor labs and assess patient for signs and symptoms of electrolyte imbalances  - Administer electrolyte replacement as ordered  - Monitor response to electrolyte replacements, including repeat lab results as appropriate  - Instruct patient on fluid and nutrition as appropriate  Outcome: Progressing  Goal: Fluid balance maintained  Description: INTERVENTIONS:  - Monitor labs   - Monitor I/O and WT  - Instruct patient on fluid and nutrition as appropriate  - Assess for signs & symptoms of volume excess or deficit  Outcome: Progressing     Problem: SKIN/TISSUE INTEGRITY - ADULT  Goal: Skin Integrity remains intact(Skin Breakdown Prevention)  Description: Assess:  -Perform Anton assessment every shift  -Clean and moisturize skin every shift  -Inspect skin when repositioning, toileting, and assisting with ADLS  -Assess under medical devices such as shift every shift  -Assess extremities for adequate circulation and sensation     Bed Management:  -Have minimal linens on bed & keep smooth, unwrinkled  -Change linens as needed when moist or perspiring  -Avoid sitting or lying in one position for more than 2 hours while in bed  -Keep HOB at 30 degrees     Toileting:  -Offer bedside commode  -Assess for incontinence every shift  -Use incontinent care products after each incontinent episode such as p tot     Activity:  -Mobilize patient 3 times a day  -Encourage activity and walks on unit  -Encourage or provide ROM exercises   -Turn and reposition patient every 2 Hours  -Use appropriate equipment to lift or move patient in bed  -Instruct/ Assist with weight shifting every 2h when out of bed in chair  -Consider limitation of chair time 2 hour intervals    Skin Care:  -Avoid use of baby powder, tape, friction and shearing, hot water or constrictive clothing  -Relieve pressure over bony prominences using allevyn  -Do not massage red bony areas    Next Steps:  -Teach patient strategies to minimize risks such as p to t   -Consider consults to  interdisciplinary teams such as pt ot  Outcome: Progressing     Problem: MUSCULOSKELETAL - ADULT  Goal: Maintain or return mobility to safest level of function  Description: INTERVENTIONS:  - Assess patient's ability to carry out ADLs; assess patient's baseline for ADL function and identify physical deficits which impact ability to perform ADLs (bathing, care of mouth/teeth, toileting, grooming, dressing, etc )  - Assess/evaluate cause of self-care deficits   - Assess range of motion  - Assess patient's mobility  - Assess patient's need for assistive devices and provide as appropriate  - Encourage maximum independence but intervene and supervise when necessary  - Involve family in performance of ADLs  - Assess for home care needs following discharge   - Consider OT consult to assist with ADL evaluation and planning for discharge  - Provide patient education as appropriate  Outcome: Progressing

## 2022-05-11 NOTE — ASSESSMENT & PLAN NOTE
Patient presented with right hand swelling, edema with recent cat bite last Saturday  Seen by PCP, referred to ED, took 1 dose of augmentin  Evaluated in the ED, started on unasyn  Received tetanus shot in ED  Continue IV unasyn 3gm q6 hours    Swelling has improved, leukocytosis improving, will require IV abx for additional 24 hours  On discharge, will likely need augmentin for 10 day course

## 2022-05-11 NOTE — NURSING NOTE
Pt received to med surg  VSS  Pt awake alert oriented x 4  HRR,   RA status, denies chest pain, denies shortness of breath/cough  R Hand pink warm swollen with scab to mid dorsal hand  Elevated on pillow, pt c/o moderate pain to R hand, analgesia administered as ordered  Education RT disease process ongoing, IV abd continued as ordered  Good appetite, LBM 5/11/22  Urinates freely in bathroom, gait steady, ad adiel  IV patent flushed  Pt denies needs, personal needs and call bell within reach

## 2022-05-11 NOTE — UTILIZATION REVIEW
Inpatient Admission Authorization Request   NOTIFICATION OF INPATIENT ADMISSION/INPATIENT AUTHORIZATION REQUEST   SERVICING FACILITY:   [facility]  [address], [address]  Tax ID: [id number]  NPI: [id number]  Place of [facility]patient [facility]  [address]  Place of Service Code: 24     ATTENDING PROVIDER:  Attending Name and NPI#: Oli Barkley [[id number]]  Address: [address] [place] [address], [address] [facility]  Phone: [phone]     UTILIZATION REVIEW CONTACT:  Kris Turcios, Utilization   Network Utilization Review Department  Phone: [phone]  Fax [phone]  Email: [email or web address]     PHYSICIAN ADVISORY SERVICES:  FOR [id number] REVIEW - MEDICAL NECESSITY DENIAL  Phone: [phone]  Fax: [phone]  Email: [email or web address]     TYPE OF REQUEST:  Inpatient Status     ADMISSION INFORMATION:  ADMISSION DATE/TIME: 5/10/22  4:35 PM  PATIENT DIAGNOSIS CODE/DESCRIPTION:  Hand swelling [M79 89]  Cellulitis of hand, right [Y50 934]  Cat bite of hand [S61 459A, W55 01XA]  DISCHARGE DATE/TIME: No discharge date for patient encounter  IMPORTANT INFORMATION:  Please contact the Kris Turcios directly with any questions or concerns regarding this request  Department voicemails are confidential     Send requests for admission clinical reviews, concurrent reviews, approvals, and administrative denials due to lack of clinical to fax [phone]

## 2022-05-11 NOTE — UTILIZATION REVIEW
Initial Clinical Review    Admission: Date/Time/Statement:   Admission Orders (From admission, onward)     Ordered        05/10/22 1635  Inpatient Admission  Once                      Orders Placed This Encounter   Procedures    Inpatient Admission     Standing Status:   Standing     Number of Occurrences:   1     Order Specific Question:   Level of Care     Answer:   Med Surg [16]     Order Specific Question:   Estimated length of stay     Answer:   More than 2 Midnights     Order Specific Question:   Certification     Answer:   I certify that inpatient services are medically necessary for this patient for a duration of greater than two midnights  See H&P and MD Progress Notes for additional information about the patient's course of treatment  ED Arrival Information     Expected   5/10/2022     Arrival   5/10/2022 15:44    Acuity   Urgent            Means of arrival   Walk-In    Escorted by   Spouse    Service   Hospitalist    Admission type   Urgent            Arrival complaint   Cat bite/swelling hand           Chief Complaint   Patient presents with    Cat Bite     pt was bit by her own cat in the right hand  pt reports increased swelling on monday and today  Initial Presentation: 67 y o  female  presents to the ED from home with swelling and redness of R hand  Patient reportedly had a cat bite on Saturday last week with increasing swelling noted the following day  There was some pus and drainage that she was trying to squeeze out  She saw her PCP today who recommended ED for evaluation for antibiotics, but patient hesitant and was prescribed Augmentin  She reportedly took one dose of Augmentin with no significant improvement  PMH includes HTN and GERD  She has had decreased range of motion in her right fingers, hand, and wrist due to swelling  5/10 Plan: Inpatient admission for evaluation and treatment of cellulitis of R hand: IV antibiotic, monitor BMP, CMP    If no improvement by tomorrow, consider imaging and Ortho eval      5/11 Internal Medicine: R hand swelling has improved, leukocytosis improving  Pain currently controlled on Tylenol  Will require IV antibiotic for additional 24 hours  PE: R hand swelling, erythema, increased mobility, less tender         5/10            ED Triage Vitals   Temperature Pulse Respirations Blood Pressure SpO2   05/10/22 1556 05/10/22 1556 05/10/22 1556 05/11/22 0214 05/10/22 1556   98 9 °F (37 2 °C) 77 18 141/92 99 %      Temp Source Heart Rate Source Patient Position - Orthostatic VS BP Location FiO2 (%)   05/10/22 1556 05/10/22 1556 05/10/22 1556 05/10/22 1556 --   Temporal Monitor Lying Right arm       Pain Score       05/10/22 1556       9          Wt Readings from Last 1 Encounters:   05/10/22 55 8 kg (123 lb)     Additional Vital Signs:       Date/Time Temp Pulse Resp BP MAP (mmHg) SpO2 O2 Device   05/11/22 0830 98 °F (36 7 °C) 68 17 132/62 -- 98 % --   05/11/22 0821 -- -- -- 132/62 -- -- --   05/11/22 0214 98 7 °F (37 1 °C) 77 -- 141/92 106 98 % --           Pertinent Labs/Diagnostic Test Results:           Results from last 7 days   Lab Units 05/11/22  0616 05/10/22  1623   WBC Thousand/uL 6 10 9 04   HEMOGLOBIN g/dL 11 0* 12 2   HEMATOCRIT % 34 0* 37 3   PLATELETS Thousands/uL 240 239   NEUTROS ABS Thousands/µL  --  7 36         Results from last 7 days   Lab Units 05/11/22  0616 05/10/22  1623   SODIUM mmol/L 135 138   POTASSIUM mmol/L 4 2 4 3   CHLORIDE mmol/L 103 104   CO2 mmol/L 23 23   ANION GAP mmol/L 9 11   BUN mg/dL 16 22   CREATININE mg/dL 1 20 1 32*   EGFR ml/min/1 73sq m 43 38   CALCIUM mg/dL 8 9 9 5     Results from last 7 days   Lab Units 05/10/22  1623   AST U/L 11*   ALT U/L 8   ALK PHOS U/L 113*   TOTAL PROTEIN g/dL 7 4   ALBUMIN g/dL 4 3   TOTAL BILIRUBIN mg/dL 0 46         Results from last 7 days   Lab Units 05/11/22  0616 05/10/22  1623   GLUCOSE RANDOM mg/dL 105 83       ED Treatment:   Medication Administration from 05/10/2022 1046 to 05/11/2022 1211       Date/Time Order Dose Route Action     05/10/2022 1630 tetanus-diphtheria-acellular pertussis (BOOSTRIX) IM injection 0 5 mL 0 5 mL Intramuscular Given     05/10/2022 1930 ampicillin-sulbactam (UNASYN) 1 5 g in sodium chloride 0 9 % 50 mL IVPB 0 g Intravenous Stopped     05/10/2022 1835 ampicillin-sulbactam (UNASYN) 1 5 g in sodium chloride 0 9 % 50 mL IVPB 1 5 g Intravenous New Bag     05/10/2022 1629 acetaminophen (TYLENOL) tablet 650 mg 650 mg Oral Given     05/11/2022 0823 enoxaparin (LOVENOX) subcutaneous injection 40 mg 40 mg Subcutaneous Given     05/11/2022 0821 cholecalciferol (VITAMIN D3) tablet 2,000 Units 2,000 Units Oral Given     05/11/2022 0821 doxazosin (CARDURA) tablet 2 mg 2 mg Oral Given     05/11/2022 0614 pantoprazole (PROTONIX) EC tablet 20 mg 20 mg Oral Given     05/11/2022 0854 ampicillin-sulbactam (UNASYN) 1 5 g in sodium chloride 0 9 % 50 mL IVPB 0 g Intravenous Stopped     05/11/2022 0824 ampicillin-sulbactam (UNASYN) 1 5 g in sodium chloride 0 9 % 50 mL IVPB 1 5 g Intravenous New Bag     05/11/2022 0242 ampicillin-sulbactam (UNASYN) 1 5 g in sodium chloride 0 9 % 50 mL IVPB 0 g Intravenous Stopped     05/11/2022 0212 ampicillin-sulbactam (UNASYN) 1 5 g in sodium chloride 0 9 % 50 mL IVPB 1 5 g Intravenous New Bag     05/11/2022 1017 diphenhydrAMINE (BENADRYL) tablet 25 mg 25 mg Oral Given           Benign essential hypertension      Admitting Diagnosis: Hand swelling [M79 89]  Age/Sex: 67 y o  female       Admission Orders:  SCD      Scheduled Medications:      ampicillin-sulbactam, 1 5 g, Intravenous, Q6H  cholecalciferol, 2,000 Units, Oral, Daily  doxazosin, 2 mg, Oral, Daily  enoxaparin, 40 mg, Subcutaneous, Daily  pantoprazole, 20 mg, Oral, Early Morning      Continuous IV Infusions: None        PRN Meds:  acetaminophen, 650 mg, Oral, Q6H PRN  ondansetron, 4 mg, Intravenous, Q6H PRN  oxyCODONE, 2 5 mg, Oral, Q6H PRN        Network Utilization Review Department  ATTENTION: Please call with any questions or concerns to [phone] and carefully listen to the prompts so that you are directed to the right person  All voicemails are confidential   Louisa Apple all requests for admission clinical reviews, approved or denied determinations and any other requests to dedicated fax number below belonging to the campus where the patient is receiving treatment   List of dedicated fax numbers for the Facilities:  [address] DENIALS (Administrative/Medical Necessity) [phone]   [address] (Maternity/NICU/Pediatrics) [phone]   [address]  [address] [place] [address] [id number] [place] [address] [address] [facility] [place] [address] [phone]

## 2022-05-12 VITALS
HEART RATE: 58 BPM | TEMPERATURE: 98.3 F | HEIGHT: 63 IN | SYSTOLIC BLOOD PRESSURE: 97 MMHG | OXYGEN SATURATION: 97 % | DIASTOLIC BLOOD PRESSURE: 50 MMHG | BODY MASS INDEX: 21.79 KG/M2 | RESPIRATION RATE: 17 BRPM | WEIGHT: 123 LBS

## 2022-05-12 PROCEDURE — 99217 PR OBSERVATION CARE DISCHARGE MANAGEMENT: CPT | Performed by: INTERNAL MEDICINE

## 2022-05-12 RX ORDER — OXYCODONE HYDROCHLORIDE 5 MG/1
2.5 TABLET ORAL EVERY 6 HOURS PRN
Qty: 10 TABLET | Refills: 0 | Status: SHIPPED | OUTPATIENT
Start: 2022-05-12 | End: 2022-05-22

## 2022-05-12 RX ADMIN — ENOXAPARIN SODIUM 40 MG: 100 INJECTION SUBCUTANEOUS at 08:56

## 2022-05-12 RX ADMIN — Medication 2000 UNITS: at 08:56

## 2022-05-12 RX ADMIN — PANTOPRAZOLE SODIUM 20 MG: 20 TABLET, DELAYED RELEASE ORAL at 05:43

## 2022-05-12 RX ADMIN — ACETAMINOPHEN 650 MG: 325 TABLET ORAL at 00:05

## 2022-05-12 RX ADMIN — SODIUM CHLORIDE 1.5 G: 9 INJECTION, SOLUTION INTRAVENOUS at 01:55

## 2022-05-12 RX ADMIN — SODIUM CHLORIDE 1.5 G: 9 INJECTION, SOLUTION INTRAVENOUS at 08:56

## 2022-05-12 NOTE — CASE MANAGEMENT
Case Management Assessment & Discharge Planning Note    Patient name Ken Lezama/-45 MRN [id number]  : 1945 Date 2022       Current Admission Date: 5/10/2022  Current Admission Diagnosis:Cellulitis of right hand   Patient Active Problem List    Diagnosis Date Noted    Cellulitis of right hand 05/10/2022    GERD (gastroesophageal reflux disease) 05/10/2022    Age related osteoporosis 05/10/2022    NSTEMI (non-ST elevated myocardial infarction) ([facility] ) 2020    Closed fracture of right proximal humerus 02/15/2020    Rheumatoid arthritis involving both hands with positive rheumatoid factor ([facility] ) 2019    S/P trigger finger release 2019    Lung density on x-ray 10/24/2017    Osteopenia 10/23/2017    Cataract 2016    Benign essential hypertension 2016    Intermittent palpitations 2016    Abnormal EKG 2016    Visual field scotoma of both eyes 2016    Allergic rhinitis 2015      LOS (days): 2  Geometric Mean LOS (GMLOS) (days): 3 20  Days to GMLOS:1 5     OBJECTIVE:    Risk of Unplanned Readmission Score: 6 51         Current admission status: Inpatient  Referral Reason: Other (d/c planning)    Preferred Pharmacy:   [facility] [address]  [address] [place] [id number]  Phone: [phone] Fax: [phone]    Primary Care Provider: Jennifer Medrano PA-C    Primary Insurance: BLUE CROSS  Secondary Insurance:     ASSESSMENT:  [place] [address] Proxies    There are no active Health Care Proxies on file         Advance Directives  Does patient have a Health Care POA?: No  Was patient offered paperwork?: Yes (pt declined paperwork)  Does patient have Advance Directives?: No  Was patient offered paperwork?: Yes (pt declined paperwork)         Readmission Root Cause  30 Day Readmission: No    Patient Information  Admitted from[de-identified] Home  Mental Status: Alert  During Assessment patient was accompanied by: Not accompanied during assessment  Assessment information provided by[de-identified] Patient  Primary Caregiver: Self  Support Systems: Spouse/significant other  [place] of Residence: [address] do you live in?: [address] entry access options   Select all that apply : Stairs  Number of steps to enter home : 1 (enters from the garage)  Do the steps have railings?: No  Type of Current Residence: 2 story home  Upon entering residence, is there a bedroom on the main floor (no further steps)?: No  A bedroom is located on the following floor levels of residence (select all that apply):: 2nd Floor  Upon entering residence, is there a bathroom on the main floor (no further steps)?: Yes (1/2 bath on 1st % full bath on 2nd)  Number of steps to 2nd floor from main floor: One Flight  In the last 12 months, was there a time when you were not able to pay the mortgage or rent on time?: No  In the last 12 months, how many places have you lived?: 1  In the last 12 months, was there a time when you did not have a steady place to sleep or slept in a shelter (including now)?: No  Homeless/housing insecurity resource given?: N/A  Living Arrangements: Lives w/ Spouse/significant other  Is patient a ?: No    Activities of Daily Living Prior to Admission  Functional Status: Independent  Completes ADLs independently?: Yes  Ambulates independently?: Yes  Does patient use assisted devices?: No  Does patient currently own DME?: No  Does patient have a history of Outpatient Therapy (PT/OT)?: No  Does the patient have a history of Short-Term Rehab?: No  Does patient have a history of HHC?: No  Does patient currently have [facility]?: No         Patient Information Continued  Income Source: Pension/custodial  Does patient have prescription coverage?: Yes ([facility] [place])  Within the past 12 months, you worried that your food would run out before you got the money to buy more : Never true  Within the past 12 months, the food you bought just didn't last and you didn't have money to get more : Never true  Food insecurity resource given?: N/A  Does patient receive dialysis treatments?: No  Does patient have a history of substance abuse?: No  Does patient have a history of Mental Health Diagnosis?: No         Means of Transportation  Means of Transport to Appts[de-identified] Drives Self  In the past 12 months, has lack of transportation kept you from medical appointments or from getting medications?: No  In the past 12 months, has lack of transportation kept you from meetings, work, or from getting things needed for daily living?: No  Was application for public transport provided?: N/A        DISCHARGE DETAILS:    Discharge planning discussed with[de-identified] patient and  at 09:47 am  Freedom of Choice: Yes  Comments - Freedom of Choice: pt and spouse deny any d/c needs, pt and spouse are in agreement with the d/c and d/c plan  CM contacted family/caregiver?: Yes  Were Treatment Team discharge recommendations reviewed with patient/caregiver?: Yes  Did patient/caregiver verbalize understanding of patient care needs?: Yes  Were patient/caregiver advised of the risks associated with not following Treatment Team discharge recommendations?: Yes    Contacts  Patient Contacts: Roz Crystal  Relationship to Patient[de-identified] Family (spouse)  Contact Method: Phone  Phone Number: [phone]  Reason/Outcome: Discharge [address]         Is the patient interested in [facility] at discharge?: No    DME Referral Provided  Referral made for DME?: No    Other Referral/Resources/Interventions Provided:  Referral Comments: pt denies any d/c needs    Would you like to participate in our [address] service program?  : No - Declined    Treatment Team Recommendation: Home (home with spouse and outpt follow up -  will transport)  Discharge Destination Plan[de-identified] Home (home with spouse and outpt follow up)  Transport at Discharge : Automobile, Family

## 2022-05-12 NOTE — DISCHARGE INSTRUCTIONS
Discharge instructions from hospitalist  1  Follow-up with your primary care physician in 1 week in regards to recent hospitalization  2  Take medications regularly    Changes in medications    Take Augmentin for total antibiotic course of 10 days  Take ibuprofen for mild pain  Take oxycodone half tablets every 6 hours as needed for moderate pain  3  Come back to the ER if symptoms recur or worsen  4  Activity as tolerated  5   Diet :  Heart healthy diet; information packet has more detailed information

## 2022-05-12 NOTE — PLAN OF CARE
Problem: Potential for Falls  Goal: Patient will remain free of falls  Description: INTERVENTIONS:  - Educate patient/family on patient safety including physical limitations  - Instruct patient to call for assistance with activity   - Consult OT/PT to assist with strengthening/mobility   - Keep Call bell within reach  - Keep bed low and locked with side rails adjusted as appropriate  - Keep care items and personal belongings within reach  - Initiate and maintain comfort rounds  - Make Fall Risk Sign visible to staff  - Offer Toileting every 2 Hours, in advance of need  - Initiate/Maintain bed alarm  - Obtain necessary fall risk management equipment  - Apply yellow socks and bracelet for high fall risk patients  - Consider moving patient to room near nurses station  Outcome: Progressing     Problem: Nutrition/Hydration-ADULT  Goal: Nutrient/Hydration intake appropriate for improving, restoring or maintaining nutritional needs  Description: Monitor and assess patient's nutrition/hydration status for malnutrition  Collaborate with interdisciplinary team and initiate plan and interventions as ordered  Monitor patient's weight and dietary intake as ordered or per policy  Utilize nutrition screening tool and intervene as necessary  Determine patient's food preferences and provide high-protein, high-caloric foods as appropriate       INTERVENTIONS:  - Monitor oral intake, urinary output, labs, and treatment plans  - Assess nutrition and hydration status and recommend course of action  - Evaluate amount of meals eaten  - Assist patient with eating if necessary   - Allow adequate time for meals  - Recommend/ encourage appropriate diets, oral nutritional supplements, and vitamin/mineral supplements  - Order, calculate, and assess calorie counts as needed  - Recommend, monitor, and adjust tube feedings and TPN/PPN based on assessed needs  - Assess need for intravenous fluids  - Provide specific nutrition/hydration education as appropriate  - Include patient/family/caregiver in decisions related to nutrition  Outcome: Progressing     Problem: PAIN - ADULT  Goal: Verbalizes/displays adequate comfort level or baseline comfort level  Description: Interventions:  - Encourage patient to monitor pain and request assistance  - Assess pain using appropriate pain scale  - Administer analgesics based on type and severity of pain and evaluate response  - Implement non-pharmacological measures as appropriate and evaluate response  - Consider cultural and social influences on pain and pain management  - Notify physician/advanced practitioner if interventions unsuccessful or patient reports new pain  Outcome: Progressing     Problem: INFECTION - ADULT  Goal: Absence or prevention of progression during hospitalization  Description: INTERVENTIONS:  - Assess and monitor for signs and symptoms of infection  - Monitor lab/diagnostic results  - Monitor all insertion sites, i e  indwelling lines, tubes, and drains  - Monitor endotracheal if appropriate and nasal secretions for changes in amount and color  - [place] appropriate cooling/warming therapies per order  - Administer medications as ordered  - Instruct and encourage patient and family to use good hand hygiene technique  - Identify and instruct in appropriate isolation precautions for identified infection/condition  Outcome: Progressing  Goal: Absence of fever/infection during neutropenic period  Description: INTERVENTIONS:  - Monitor WBC    Outcome: Progressing    Goal: Maintain or return to baseline ADL function  Description: INTERVENTIONS:  -  Assess patient's ability to carry out ADLs; assess patient's baseline for ADL function and identify physical deficits which impact ability to perform ADLs (bathing, care of mouth/teeth, toileting, grooming, dressing, etc )  - Assess/evaluate cause of self-care deficits   - Assess range of motion  - Assess patient's mobility; develop plan if impaired  - Assess patient's need for assistive devices and provide as appropriate  - Encourage maximum independence but intervene and supervise when necessary  - Involve family in performance of ADLs  - Assess for home care needs following discharge   - Consider OT consult to assist with ADL evaluation and planning for discharge  - Provide patient education as appropriate  Outcome: Progressing  Goal: Maintains/Returns to pre admission functional level  Description: INTERVENTIONS:  - Perform BMAT or MOVE assessment daily    - Set and communicate daily mobility goal to care team and patient/family/caregiver  - Collaborate with rehabilitation services on mobility goals if consulted  - Perform Range of Motion 3 times a day  - Reposition patient every 2 hours    - Dangle patient 2 times a day  - Stand patient 2 times a day  - Ambulate patient 2 times a day  - Out of bed to chair 2 times a day   - Out of bed for meals 2 times a day  - Out of bed for toileting  - Record patient progress and toleration of activity level   Outcome: Progressing     Problem: DISCHARGE PLANNING  Goal: Discharge to home or other facility with appropriate resources  Description: INTERVENTIONS:  - Identify barriers to discharge w/patient and caregiver  - Arrange for needed discharge resources and transportation as appropriate  - Identify discharge learning needs (meds, wound care, etc )  - Arrange for interpretive services to assist at discharge as needed  - Refer to Case Management Department for coordinating discharge planning if the patient needs post-hospital services based on physician/advanced practitioner order or complex needs related to functional status, cognitive ability, or social support system  Outcome: Progressing     Problem: Knowledge Deficit  Goal: Patient/family/caregiver demonstrates understanding of disease process, treatment plan, medications, and discharge instructions  Description: Complete learning assessment and assess knowledge base   Interventions:  - Provide teaching at level of understanding  - Provide teaching via preferred learning methods  Outcome: Progressing     Problem: CARDIOVASCULAR - ADULT  Goal: Maintains optimal cardiac output and hemodynamic stability  Description: INTERVENTIONS:  - Monitor I/O, vital signs and rhythm  - Monitor for S/S and trends of decreased cardiac output  - Administer and titrate ordered vasoactive medications to optimize hemodynamic stability  - Assess quality of pulses, skin color and temperature  - Assess for signs of decreased coronary artery perfusion  - Instruct patient to report change in severity of symptoms  Outcome: Progressing  Goal: Absence of cardiac dysrhythmias or at baseline rhythm  Description: INTERVENTIONS:  - Continuous cardiac monitoring, vital signs, obtain 12 lead EKG if ordered  - Administer antiarrhythmic and heart rate control medications as ordered  - Monitor electrolytes and administer replacement therapy as ordered  Outcome: Progressing     Problem: RESPIRATORY - ADULT  Goal: Achieves optimal ventilation and oxygenation  Description: INTERVENTIONS:  - Assess for changes in respiratory status  - Assess for changes in mentation and behavior  - Position to facilitate oxygenation and minimize respiratory effort  - Oxygen administered by appropriate delivery if ordered  - Initiate smoking cessation education as indicated  - Encourage broncho-pulmonary hygiene including cough, deep breathe, Incentive Spirometry  - Assess the need for suctioning and aspirate as needed  - Assess and instruct to report SOB or any respiratory difficulty  - Respiratory Therapy support as indicated  Outcome: Progressing     Problem: GASTROINTESTINAL - ADULT  Goal: Minimal or absence of nausea and/or vomiting  Description: INTERVENTIONS:  - Administer IV fluids if ordered to ensure adequate hydration  - Maintain NPO status until nausea and vomiting are resolved  - Nasogastric tube if ordered  - Administer ordered antiemetic medications as needed  - Provide nonpharmacologic comfort measures as appropriate  - Advance diet as tolerated, if ordered  - Consider nutrition services referral to assist patient with adequate nutrition and appropriate food choices  Outcome: Progressing  Goal: Maintains or returns to baseline bowel function  Description: INTERVENTIONS:  - Assess bowel function  - Encourage oral fluids to ensure adequate hydration  - Administer IV fluids if ordered to ensure adequate hydration  - Administer ordered medications as needed  - Encourage mobilization and activity  - Consider nutritional services referral to assist patient with adequate nutrition and appropriate food choices  Outcome: Progressing  Goal: Maintains adequate nutritional intake  Description: INTERVENTIONS:  - Monitor percentage of each meal consumed  - Identify factors contributing to decreased intake, treat as appropriate  - Assist with meals as needed  - Monitor I&O, weight, and lab values if indicated  - Obtain nutrition services referral as needed  Outcome: Progressing     Problem: GENITOURINARY - ADULT  Goal: Maintains or returns to baseline urinary function  Description: INTERVENTIONS:  - Assess urinary function  - Encourage oral fluids to ensure adequate hydration if ordered  - Administer IV fluids as ordered to ensure adequate hydration  - Administer ordered medications as needed  - Offer frequent toileting  - Follow urinary retention protocol if ordered  Outcome: Progressing     Problem: METABOLIC, FLUID AND ELECTROLYTES - ADULT  Goal: Electrolytes maintained within normal limits  Description: INTERVENTIONS:  - Monitor labs and assess patient for signs and symptoms of electrolyte imbalances  - Administer electrolyte replacement as ordered  - Monitor response to electrolyte replacements, including repeat lab results as appropriate  - Instruct patient on fluid and nutrition as appropriate  Outcome: Progressing  Goal: Fluid balance maintained  Description: INTERVENTIONS:  - Monitor labs   - Monitor I/O and WT  - Instruct patient on fluid and nutrition as appropriate  - Assess for signs & symptoms of volume excess or deficit  Outcome: Progressing     Problem: MUSCULOSKELETAL - ADULT  Goal: Maintain or return mobility to safest level of function  Description: INTERVENTIONS:  - Assess patient's ability to carry out ADLs; assess patient's baseline for ADL function and identify physical deficits which impact ability to perform ADLs (bathing, care of mouth/teeth, toileting, grooming, dressing, etc )  - Assess/evaluate cause of self-care deficits   - Assess range of motion  - Assess patient's mobility  - Assess patient's need for assistive devices and provide as appropriate  - Encourage maximum independence but intervene and supervise when necessary  - Involve family in performance of ADLs  - Assess for home care needs following discharge   - Consider OT consult to assist with ADL evaluation and planning for discharge  - Provide patient education as appropriate  Outcome: Progressing

## 2022-05-12 NOTE — ASSESSMENT & PLAN NOTE
Patient presented with right hand swelling, edema with recent cat bite last Saturday  Seen by PCP, referred to ED, took 1 dose of augmentin  Evaluated in the ED, started on unasyn  Received tetanus shot in ED  Received IV unasyn 3gm q6 hours    Patient is clinically improved  On discharge, will give augmentin for 10 day course

## 2022-05-12 NOTE — DISCHARGE SUMMARY
[facility] [address]  Discharge- Adolph De La O 1945, 67 y o  female MRN: [id number]  Unit/Bed#: -01 Encounter: [id number]  Primary Care Provider: Bev Cee PA-C   Date and time admitted to hospital: 5/10/2022  4:00 PM    * Cellulitis of right hand  Assessment & Plan  Patient presented with right hand swelling, edema with recent cat bite last Saturday  Seen by PCP, referred to ED, took 1 dose of augmentin  Evaluated in the ED, started on unasyn  Received tetanus shot in ED  Received IV unasyn 3gm q6 hours    Patient is clinically improved  On discharge, will give augmentin for 10 day course    Age related osteoporosis  Assessment & Plan  As noted on prior DEXA scan on 11/2021  Ambulatory referral for endocrine given by primary, recommend to continue follow up  Unable to tolerate fosamax    GERD (gastroesophageal reflux disease)  Assessment & Plan  Continue ppi    Benign essential hypertension  Assessment & Plan  Continue cardura      Discharging Physician / Practitioner: Jan Hillman MD  PCP: Bev Cee PA-C  Admission Date:   Admission Orders (From admission, onward)     Ordered        05/10/22 1635  Inpatient Admission  Once                      Discharge Date: 05/12/22    Medical Problems             Resolved Problems  Date Reviewed: 5/12/2022   None                 Consultations During Hospital Stay:  · None    Procedures Performed:   · None    Significant Findings / Test Results:   · None    Incidental Findings:   · None     Test Results Pending at Discharge (will require follow up): · None     Outpatient Tests Requested:  · None    Complications:  No    Reason for Admission:  Right hand swelling    Hospital Course:     Adolph De La O is a 67 y o  female patient with PMH of HTN, GERD  , age related osteoporosis who originally presented to the hospital on 5/10/2022 due to hand swelling after cat bite  Please refer to H&P on 5/10 for details    Patient was admitted for right hand cellulitis due to cat bite injury  She took 1 dose of Augmentin at home with no significant relief in symptoms  At the time of admission, she was started on IV Unasyn  During hospitalization, patient has been clinically improving  Patient remains afebrile  No leukocytosis  The hand swelling, redness and warmth has been decreasing slowly  Patient has decreased range of motion of right fingers, hand and wrist due to swelling  Today, patient feels well enough to go home  Patient is recommended to follow up with PCP in 1 week  Patient will be given Augmentin for the total 10 day cause  Patient is clinically and hemodynamically stable to be discharged today  Please see above list of diagnoses and related plan for additional information  Condition at Discharge: stable     Discharge Day Visit / Exam:     Subjective:  Patient was seen and examined at bedside  The patient stated her right hand swelling and redness has been improving slowly      Vitals: Blood Pressure: 97/50 (05/12/22 0634)  Pulse: 58 (05/12/22 0634)  Temperature: 98 3 °F (36 8 °C) (05/12/22 0634)  Temp Source: Oral (05/11/22 1441)  Respirations: 17 (05/12/22 0634)  Height: 5' 3" (160 cm) (05/11/22 1441)  Weight - Scale: 55 8 kg (123 lb) (05/11/22 1441)  SpO2: 97 % (05/12/22 0634)  Exam:   Physical Exam  General: breathing well on room air, no acute distress  HEENT: NC/AT, PERRL, EOM - normal  Neck: Supple  Pulm/Chest: Normal chest wall expansion, clear breath sounds on both side, no wheezing/rhonchi or crackles appreciated  CVS: RRR, normal S1&S2, no murmur appreciated, capillary refill <2s  Abd: soft, non tender, non distended, bowel sounds +  MSK: move all 4 extremities spontaneously, right hand swelling, redness noted  Skin: warm  CNS: no acute focal neuro deficit    Discussion with Family:  Discussed with patient    Discharge instructions/Information to patient and family:   See after visit summary for information provided to patient and family  Provisions for Follow-Up Care:  See after visit summary for information related to follow-up care and any pertinent home health orders  Disposition:     Home    For Discharges to [facility]:   · Not Applicable to this Patient - Not Applicable to this Patient    Planned Readmission: No     Discharge Statement:  I spent 45 minutes discharging the patient  This time was spent on the day of discharge  I had direct contact with the patient on the day of discharge  Greater than 50% of the total time was spent examining patient, answering all patient questions, arranging and discussing plan of care with patient as well as directly providing post-discharge instructions  Additional time then spent on discharge activities  Discharge Medications:  See after visit summary for reconciled discharge medications provided to patient and family        ** Please Note: This note has been constructed using a voice recognition system **

## 2022-05-12 NOTE — ASSESSMENT & PLAN NOTE
Patient presented with right hand swelling, edema with recent cat bite last Saturday  Seen by PCP, referred to ED, took 1 dose of augmentin  Evaluated in the ED, started on unasyn  Received tetanus shot in ED  Continue IV unasyn 3gm q6 hours    Patient is clinically improved  On discharge, will give augmentin for 10 day course

## 2022-05-12 NOTE — PLAN OF CARE
Problem: Potential for Falls  Goal: Patient will remain free of falls  Description: INTERVENTIONS:  - Educate patient/family on patient safety including physical limitations  - Instruct patient to call for assistance with activity   - Consult OT/PT to assist with strengthening/mobility   - Keep Call bell within reach  - Keep bed low and locked with side rails adjusted as appropriate  - Keep care items and personal belongings within reach  - Initiate and maintain comfort rounds  - Make Fall Risk Sign visible to staff  - Offer Toileting every 2 Hours, in advance of need  - Initiate/Maintain bed alarm  - Obtain necessary fall risk management equipment: non slip socks  - Apply yellow socks and bracelet for high fall risk patients  - Consider moving patient to room near nurses station  Outcome: Adequate for Discharge     Problem: Nutrition/Hydration-ADULT  Goal: Nutrient/Hydration intake appropriate for improving, restoring or maintaining nutritional needs  Description: Monitor and assess patient's nutrition/hydration status for malnutrition  Collaborate with interdisciplinary team and initiate plan and interventions as ordered  Monitor patient's weight and dietary intake as ordered or per policy  Utilize nutrition screening tool and intervene as necessary  Determine patient's food preferences and provide high-protein, high-caloric foods as appropriate       INTERVENTIONS:  - Monitor oral intake, urinary output, labs, and treatment plans  - Assess nutrition and hydration status and recommend course of action  - Evaluate amount of meals eaten  - Assist patient with eating if necessary   - Allow adequate time for meals  - Recommend/ encourage appropriate diets, oral nutritional supplements, and vitamin/mineral supplements  - Order, calculate, and assess calorie counts as needed  - Recommend, monitor, and adjust tube feedings and TPN/PPN based on assessed needs  - Assess need for intravenous fluids  - Provide specific nutrition/hydration education as appropriate  - Include patient/family/caregiver in decisions related to nutrition  Outcome: Adequate for Discharge     Problem: PAIN - ADULT  Goal: Verbalizes/displays adequate comfort level or baseline comfort level  Description: Interventions:  - Encourage patient to monitor pain and request assistance  - Assess pain using appropriate pain scale  - Administer analgesics based on type and severity of pain and evaluate response  - Implement non-pharmacological measures as appropriate and evaluate response  - Consider cultural and social influences on pain and pain management  - Notify physician/advanced practitioner if interventions unsuccessful or patient reports new pain  Outcome: Adequate for Discharge     Problem: INFECTION - ADULT  Goal: Absence or prevention of progression during hospitalization  Description: INTERVENTIONS:  - Assess and monitor for signs and symptoms of infection  - Monitor lab/diagnostic results  - Monitor all insertion sites, i e  indwelling lines, tubes, and drains  - Monitor endotracheal if appropriate and nasal secretions for changes in amount and color  - [place] appropriate cooling/warming therapies per order  - Administer medications as ordered  - Instruct and encourage patient and family to use good hand hygiene technique  - Identify and instruct in appropriate isolation precautions for identified infection/condition  Outcome: Adequate for Discharge  Goal: Absence of fever/infection during neutropenic period  Description: INTERVENTIONS:  - Monitor WBC    Outcome: Adequate for Discharge     Problem: SAFETY ADULT  Goal: Patient will remain free of falls  Description: INTERVENTIONS:  - Educate patient/family on patient safety including physical limitations  - Instruct patient to call for assistance with activity   - Consult OT/PT to assist with strengthening/mobility   - Keep Call bell within reach  - Keep bed low and locked with side rails adjusted as appropriate  - Keep care items and personal belongings within reach  - Initiate and maintain comfort rounds  - Make Fall Risk Sign visible to staff  - Offer Toileting every 2 Hours, in advance of need  - Initiate/Maintain bed alarm  - Obtain necessary fall risk management equipment: non slip socks  - Apply yellow socks and bracelet for high fall risk patients  - Consider moving patient to room near nurses station  Outcome: Adequate for Discharge  Goal: Maintain or return to baseline ADL function  Description: INTERVENTIONS:  -  Assess patient's ability to carry out ADLs; assess patient's baseline for ADL function and identify physical deficits which impact ability to perform ADLs (bathing, care of mouth/teeth, toileting, grooming, dressing, etc )  - Assess/evaluate cause of self-care deficits   - Assess range of motion  - Assess patient's mobility; develop plan if impaired  - Assess patient's need for assistive devices and provide as appropriate  - Encourage maximum independence but intervene and supervise when necessary  - Involve family in performance of ADLs  - Assess for home care needs following discharge   - Consider OT consult to assist with ADL evaluation and planning for discharge  - Provide patient education as appropriate  Outcome: Adequate for Discharge  Goal: Maintains/Returns to pre admission functional level  Description: INTERVENTIONS:  - Perform BMAT or MOVE assessment daily    - Set and communicate daily mobility goal to care team and patient/family/caregiver  - Collaborate with rehabilitation services on mobility goals if consulted  - Perform Range of Motion 2 times a day  - Reposition patient every 2 hours    - Dangle patient 3 times a day  - Stand patient 3 times a day  - Ambulate patient 3 times a day  - Out of bed to chair 3 times a day   - Out of bed for meals 2 times a day  - Out of bed for toileting  - Record patient progress and toleration of activity level   Outcome: Adequate for Discharge     Problem: DISCHARGE PLANNING  Goal: Discharge to home or other facility with appropriate resources  Description: INTERVENTIONS:  - Identify barriers to discharge w/patient and caregiver  - Arrange for needed discharge resources and transportation as appropriate  - Identify discharge learning needs (meds, wound care, etc )  - Arrange for interpretive services to assist at discharge as needed  - Refer to Case Management Department for coordinating discharge planning if the patient needs post-hospital services based on physician/advanced practitioner order or complex needs related to functional status, cognitive ability, or social support system  Outcome: Adequate for Discharge     Problem: Knowledge Deficit  Goal: Patient/family/caregiver demonstrates understanding of disease process, treatment plan, medications, and discharge instructions  Description: Complete learning assessment and assess knowledge base    Interventions:  - Provide teaching at level of understanding  - Provide teaching via preferred learning methods  Outcome: Adequate for Discharge     Problem: CARDIOVASCULAR - ADULT  Goal: Maintains optimal cardiac output and hemodynamic stability  Description: INTERVENTIONS:  - Monitor I/O, vital signs and rhythm  - Monitor for S/S and trends of decreased cardiac output  - Administer and titrate ordered vasoactive medications to optimize hemodynamic stability  - Assess quality of pulses, skin color and temperature  - Assess for signs of decreased coronary artery perfusion  - Instruct patient to report change in severity of symptoms  Outcome: Adequate for Discharge  Goal: Absence of cardiac dysrhythmias or at baseline rhythm  Description: INTERVENTIONS:  - Continuous cardiac monitoring, vital signs, obtain 12 lead EKG if ordered  - Administer antiarrhythmic and heart rate control medications as ordered  - Monitor electrolytes and administer replacement therapy as ordered  Outcome: Adequate for Discharge     Problem: RESPIRATORY - ADULT  Goal: Achieves optimal ventilation and oxygenation  Description: INTERVENTIONS:  - Assess for changes in respiratory status  - Assess for changes in mentation and behavior  - Position to facilitate oxygenation and minimize respiratory effort  - Oxygen administered by appropriate delivery if ordered  - Initiate smoking cessation education as indicated  - Encourage broncho-pulmonary hygiene including cough, deep breathe, Incentive Spirometry  - Assess the need for suctioning and aspirate as needed  - Assess and instruct to report SOB or any respiratory difficulty  - Respiratory Therapy support as indicated  Outcome: Adequate for Discharge     Problem: GASTROINTESTINAL - ADULT  Goal: Minimal or absence of nausea and/or vomiting  Description: INTERVENTIONS:  - Administer IV fluids if ordered to ensure adequate hydration  - Maintain NPO status until nausea and vomiting are resolved  - Nasogastric tube if ordered  - Administer ordered antiemetic medications as needed  - Provide nonpharmacologic comfort measures as appropriate  - Advance diet as tolerated, if ordered  - Consider nutrition services referral to assist patient with adequate nutrition and appropriate food choices  Outcome: Adequate for Discharge  Goal: Maintains or returns to baseline bowel function  Description: INTERVENTIONS:  - Assess bowel function  - Encourage oral fluids to ensure adequate hydration  - Administer IV fluids if ordered to ensure adequate hydration  - Administer ordered medications as needed  - Encourage mobilization and activity  - Consider nutritional services referral to assist patient with adequate nutrition and appropriate food choices  Outcome: Adequate for Discharge  Goal: Maintains adequate nutritional intake  Description: INTERVENTIONS:  - Monitor percentage of each meal consumed  - Identify factors contributing to decreased intake, treat as appropriate  - Assist with meals as needed  - Monitor I&O, weight, and lab values if indicated  - Obtain nutrition services referral as needed  Outcome: Adequate for Discharge     Problem: GENITOURINARY - ADULT  Goal: Maintains or returns to baseline urinary function  Description: INTERVENTIONS:  - Assess urinary function  - Encourage oral fluids to ensure adequate hydration if ordered  - Administer IV fluids as ordered to ensure adequate hydration  - Administer ordered medications as needed  - Offer frequent toileting  - Follow urinary retention protocol if ordered  Outcome: Adequate for Discharge     Problem: METABOLIC, FLUID AND ELECTROLYTES - ADULT  Goal: Electrolytes maintained within normal limits  Description: INTERVENTIONS:  - Monitor labs and assess patient for signs and symptoms of electrolyte imbalances  - Administer electrolyte replacement as ordered  - Monitor response to electrolyte replacements, including repeat lab results as appropriate  - Instruct patient on fluid and nutrition as appropriate  Outcome: Adequate for Discharge  Goal: Fluid balance maintained  Description: INTERVENTIONS:  - Monitor labs   - Monitor I/O and WT  - Instruct patient on fluid and nutrition as appropriate  - Assess for signs & symptoms of volume excess or deficit  Outcome: Adequate for Discharge     Problem: MUSCULOSKELETAL - ADULT  Goal: Maintain or return mobility to safest level of function  Description: INTERVENTIONS:  - Assess patient's ability to carry out ADLs; assess patient's baseline for ADL function and identify physical deficits which impact ability to perform ADLs (bathing, care of mouth/teeth, toileting, grooming, dressing, etc )  - Assess/evaluate cause of self-care deficits   - Assess range of motion  - Assess patient's mobility  - Assess patient's need for assistive devices and provide as appropriate  - Encourage maximum independence but intervene and supervise when necessary  - Involve family in performance of ADLs  - Assess for home care needs following discharge   - Consider OT consult to assist with ADL evaluation and planning for discharge  - Provide patient education as appropriate  Outcome: Adequate for Discharge

## 2022-05-13 ENCOUNTER — TRANSITIONAL CARE MANAGEMENT (OUTPATIENT)
Dept: FAMILY MEDICINE CLINIC | Facility: CLINIC | Age: 77
End: 2022-05-13

## 2022-05-13 NOTE — UTILIZATION REVIEW
Notification of Discharge   This is a Notification of Discharge from our facility [address]  Please be advised that this patient has been discharge from our facility  Below you will find the admission and discharge date and time including the patients disposition  UTILIZATION REVIEW CONTACT:  Bella Falcon  Utilization   Network Utilization Review Department  Phone: [phone] carefully listen to the prompts  All voicemails are confidential   Email: [email or web address]     PHYSICIAN ADVISORY SERVICES:  FOR SUIU-RT-UVGJ REVIEW - MEDICAL NECESSITY DENIAL  Phone: [phone]  Fax: [phone]  Email: [email or web address]     PRESENTATION DATE: 5/10/2022  4:00 PM  OBERVATION ADMISSION DATE:   INPATIENT ADMISSION DATE: 5/10/22  4:35 PM   DISCHARGE DATE: 5/12/2022 11:21 AM  DISPOSITION: Home/Self Care Home/Self Care      IMPORTANT INFORMATION:  Send all requests for admission clinical reviews, approved or denied determinations and any other requests to dedicated fax number below belonging to the campus where the patient is receiving treatment   List of dedicated fax numbers:  [address] DENIALS (Administrative/Medical Necessity) [phone]   [address] (Maternity/NICU/Pediatrics) [phone]   [facility] [phone]   [address] [facility] [phone]   [address] [place] [address] [phone]   2000 [facility] [address] [place] [address],[address] [place] [address] [phone]   [facility]  [phone]   [address] [facility]  [address] [facility] 1000 [address] [facility] [phone]

## 2022-05-19 ENCOUNTER — OFFICE VISIT (OUTPATIENT)
Dept: FAMILY MEDICINE CLINIC | Facility: CLINIC | Age: 77
End: 2022-05-19
Payer: COMMERCIAL

## 2022-05-19 VITALS
OXYGEN SATURATION: 98 % | HEIGHT: 64 IN | DIASTOLIC BLOOD PRESSURE: 58 MMHG | SYSTOLIC BLOOD PRESSURE: 108 MMHG | BODY MASS INDEX: 21 KG/M2 | HEART RATE: 72 BPM | WEIGHT: 123 LBS | TEMPERATURE: 97.8 F

## 2022-05-19 DIAGNOSIS — Z09 HOSPITAL DISCHARGE FOLLOW-UP: Primary | ICD-10-CM
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
## 2022-05-19 DIAGNOSIS — L03.113 CELLULITIS OF RIGHT UPPER EXTREMITY: ICD-10-CM

## 2022-05-19 PROCEDURE — 99495 TRANSJ CARE MGMT MOD F2F 14D: CPT | Performed by: PHYSICIAN ASSISTANT

## 2022-05-19 NOTE — PROGRESS NOTES
Transition of Care  Follow-up After Hospitalization    Margarito Nieves 67 y o  female   Date:  5/19/2022    TCM Call (since 4/18/2022)     Date and time call was made  5/13/2022 12:54 PM    Hospital care reviewed  Records reviewed    Patient was hospitialized at  [address]    Date of Admission  05/10/22    Date of discharge  05/12/22    Diagnosis  Cellulitis of R Hand    Disposition  Home    Were the patients medications reviewed and updated  No    Current Symptoms  --  Patient did not return phone calls      TCM Call (since 4/18/2022)     Post hospital issues  --  Patient did not return phone calls    Scheduled for follow up? No  Patient did not return phone calls    Did you obtain your prescribed medications  --  Patient did not return phone calls    Do you need help managing your prescriptions or medications  --  Patient did not return phone calls    Is transportation to your appointment needed  --  Patient did not return phone calls    I have advised the patient to call PCP with any new or worsening symptoms  [address] members; Spouse or Significiant other    Are you recieving any outpatient services  No  Patient did not return phone calls    Are you recieving home care services  No  Patient did not return phone calls    Are you using any community resources  No  Patient did not return phone calls    Current waiver services  No  Patient did not return phone calls    Have you fallen in the last 12 months  Yes  Patient did not return phone calls    How many times  a few    Interperter language line needed  No  Patient did not return phone calls    Counseling  Patient  Patient did not return phone calls        Hospital records were reviewed  Medications upon discharge reviewed/updated  Discharge Disposition:  home  Follow up visits with other specialists: none      Assessment and Plan:    Bill Rocha was seen today for transition of care management      Diagnoses and all orders for this visit:    Hospital discharge follow-up    Cellulitis of right upper extremity  -     Ambulatory Referral to General Surgery; Future  - improve s/p IV abx and oral augmentin; on 7th day of augmentin; complete 10 days, ice compacts; proceed to general surgery next week if does not resolve   - pics uploaded to media     Cat bite, sequela  -     Ambulatory Referral to General Surgery; Future        HPI:  HPI   Patient is a 68 yo female who presents for hospitalization follow up  She was hospitalized for 5/10-5/12 for R hand cellulitis following cat bite  She was treated with IV unasyn and discharged on 10 days augmentin  She was given tdap  She is on 7th day of augmentin  She has much improvement in swelling and erythema of hand/wrist  However, she still has swelling and appearance of fluid collection on dorsal aspect of hand  She explains that is is not draining but has a scab over it from bumping and it made it bleed  No fevers  ROS: Review of Systems   Constitutional: Negative for chills and fever  Musculoskeletal: Positive for arthralgias (R hand pain)  Skin: Positive for color change and wound  Neurological: Negative  History reviewed  No pertinent past medical history      Past Surgical History:   Procedure Laterality Date    APPENDECTOMY      CATARACT EXTRACTION      LAPAROSCOPIC APPENDECTOMY      incidental     [place] INCISE FINGER TENDON SHEATH Left 2/26/2019    Procedure: LONG FINGER TRIGGER FINGER RELEASE;  Surgeon: Lucinda Vazquez MD;  [facility];  Service: Orthopedics    TONSILLECTOMY      TUBAL LIGATION         Social History     Socioeconomic History    Marital status: /Civil Union     Spouse name: None    Number of children: 3    Years of education: None    Highest education level: None   Occupational History    None   Tobacco Use    Smoking status: Former Smoker     Packs/day: 1 00    Smokeless tobacco: Never Used    Tobacco comment: age 14-30    Vaping Use    Vaping Use: Never used   Substance and Sexual Activity    Alcohol use: Yes     Alcohol/week: 10 0 standard drinks     Types: 10 Glasses of wine per week    Drug use: No    Sexual activity: Not Currently   Other Topics Concern    None   Social History Narrative    Always uses seat belt     Caffeine use 2 cans/cups per day     Exercises regularly     Housewife or homemaker      Social Determinants of Health     Financial Resource Strain: Not on file   Food Insecurity: No Food Insecurity    Worried About Running Out of Food in the Last Year: Never true    Fernie of Food in the Last Year: Never true   Transportation Needs: No Transportation Needs    Lack of Transportation (Medical): No    Lack of Transportation (Non-Medical): No   Physical Activity: Not on file   Stress: Not on file   Social Connections: Not on file   Intimate Partner Violence: Not on file   Housing Stability: Low Risk     Unable to Pay for Housing in the Last Year: No    Number of Places Lived in the Last Year: 1    Unstable Housing in the Last Year: No       Family History   Problem Relation Age of Onset    Lung cancer Mother     Hypertension Mother     Cancer Mother     Cerebral aneurysm Father         bleed    Alcohol abuse Father     Cirrhosis Father     Other [facility] Other Sister         unkownn cause     Pneumonia Brother         infancy of pneumonia    Other Sister         in MVA       Allergies   Allergen Reactions    Cat Hair Extract     Dust Mite Extract     Iv Dye  [Iodinated Diagnostic Agents] Hives     Category:  Allergy;     Other      Possible reaction to chlorhexadine, but this occurred days after and in other areas of the body than just the area prepped    Pollen Extract      Seasonal allergies         Current Outpatient Medications:     acetaminophen (TYLENOL) 325 mg tablet, Take 650 mg by mouth every 6 (six) hours as needed for mild pain, Disp: , Rfl:    amoxicillin-clavulanate (Augmentin) 875-125 mg per tablet, Take 1 tablet by mouth every 12 (twelve) hours for 10 days, Disp: 20 tablet, Rfl: 0    cetirizine (ZyrTEC) 10 mg tablet, Take by mouth, Disp: , Rfl:     Cholecalciferol (VITAMIN D3) 50 MCG (2000 UT) TABS, Take 2,000 Units by mouth daily, Disp: , Rfl:     doxazosin (CARDURA) 2 mg tablet, Take 1 tablet (2 mg total) by mouth daily, Disp: 90 tablet, Rfl: 1    fluticasone (FLONASE) 50 mcg/act nasal spray, into each nostril, Disp: , Rfl:     hydrocortisone (ANUSOL-HC) 2 5 % rectal cream, Apply topically 2 (two) times a day, Disp: 28 g, Rfl: 0    ibuprofen (MOTRIN) 200 mg tablet, Take by mouth every 6 (six) hours as needed for mild pain, Disp: , Rfl:     multivitamin (THERAGRAN) TABS, Take 1 tablet by mouth in the morning , Disp: , Rfl:     omeprazole (PriLOSEC) 20 mg delayed release capsule, Take 1 capsule (20 mg total) by mouth daily, Disp: 90 capsule, Rfl: 1    vitamin B-12 (CYANOCOBALAMIN) 100 MCG TABS, Take 50 mcg by mouth daily, Disp: , Rfl:     VITAMIN D PO, Take by mouth, Disp: , Rfl:     oxyCODONE (ROXICODONE) 5 immediate release tablet, Take 0 5 tablets (2 5 mg total) by mouth every 6 (six) hours as needed for moderate pain for up to 10 days Max Daily Amount: 10 mg (Patient not taking: Reported on 5/19/2022), Disp: 10 tablet, Rfl: 0      Physical Exam:  /58 (BP Location: Left arm, Patient Position: Sitting, Cuff Size: Standard)   Pulse 72   Temp 97 8 °F (36 6 °C) (Tympanic)   Ht 5' 4" (1 626 m)   Wt 55 8 kg (123 lb)   SpO2 98%   BMI 21 11 kg/m²     Physical Exam  Constitutional:       General: She is not in acute distress  Appearance: Normal appearance  HENT:      Head: Normocephalic and atraumatic  Cardiovascular:      Rate and Rhythm: Normal rate and regular rhythm  Pulmonary:      Effort: No respiratory distress  Skin:     General: Skin is warm and dry        Comments: Patient has sig improvement in surrounding cellulitis but still has persistent 1-2 inch area of swelling and mild fluctuance underneath a scab on dorsal hand; still some mild swelling extending into knuckles   Neurological:      General: No focal deficit present  Mental Status: She is alert  Psychiatric:         Mood and Affect: Mood normal        Media Information                  Document Information    Clinical Image - Mobile Device      05/19/2022 10:02 AM   Attached To:    Office Visit on 5/19/22 with Sanna Jones PA-C     Source Information    Sanna Jones PA-C  [facility]:  Lab Results   Component Value Date    WBC 6 10 05/11/2022    HGB 11 0 (L) 05/11/2022    HCT 34 0 (L) 05/11/2022     (H) 05/11/2022     05/11/2022     Lab Results   Component Value Date    K 4 2 05/11/2022     05/11/2022    CO2 23 05/11/2022    BUN 16 05/11/2022    CREATININE 1 20 05/11/2022    GLUF 103 (H) 09/17/2021    CALCIUM 8 9 05/11/2022    CORRECTEDCA 9 3 09/17/2021    AST 11 (L) 05/10/2022    ALT 8 05/10/2022    ALKPHOS 113 (H) 05/10/2022    EGFR 43 05/11/2022

## 2022-08-08 DIAGNOSIS — K62.89 RECTAL IRRITATION: ICD-10-CM

## 2022-08-08 DIAGNOSIS — K62.89 RECTAL PAIN: ICD-10-CM

## 2022-08-10 RX ORDER — HYDROCORTISONE 25 MG/G
CREAM TOPICAL 2 TIMES DAILY
Qty: 28 G | Refills: 0 | Status: SHIPPED | OUTPATIENT
Start: 2022-08-10

## 2022-11-08 NOTE — NURSING NOTE
Pt discharged home, vss  Pt denies pain, denies shortness of breath  IV removed without complications and tip intact  Discharge instructions read and explained to pt all questions answered  All belongings with pt  Pt escorted to front entrance and assisted into car of   Isotretinoin Counseling: Patient should get monthly blood tests, not donate blood, not drive at night if vision affected, not share medication, and not undergo elective surgery for 6 months after tx completed. Side effects reviewed, pt to contact office should one occur.

## 2023-02-13 ENCOUNTER — TELEPHONE (OUTPATIENT)
Dept: FAMILY MEDICINE CLINIC | Facility: CLINIC | Age: 78
End: 2023-02-13

## 2023-02-13 DIAGNOSIS — I10 BENIGN ESSENTIAL HYPERTENSION: ICD-10-CM

## 2023-02-13 RX ORDER — DOXAZOSIN 2 MG/1
2 TABLET ORAL DAILY
Qty: 90 TABLET | Refills: 0 | Status: SHIPPED | OUTPATIENT
Start: 2023-02-13

## 2023-02-13 NOTE — TELEPHONE ENCOUNTER
Please call patient and have her schedule annual exam/follow-up visit   She was last seen in May 2022 and is overdue for an appointment  Will refill meds today  No refills after that until seen  Thank you

## 2023-03-30 ENCOUNTER — OFFICE VISIT (OUTPATIENT)
Dept: FAMILY MEDICINE CLINIC | Facility: CLINIC | Age: 78
End: 2023-03-30

## 2023-03-30 VITALS
HEIGHT: 64 IN | DIASTOLIC BLOOD PRESSURE: 54 MMHG | TEMPERATURE: 97.9 F | BODY MASS INDEX: 21.85 KG/M2 | HEART RATE: 50 BPM | SYSTOLIC BLOOD PRESSURE: 110 MMHG | OXYGEN SATURATION: 99 % | WEIGHT: 128 LBS

## 2023-03-30 DIAGNOSIS — M05.741 RHEUMATOID ARTHRITIS INVOLVING BOTH HANDS WITH POSITIVE RHEUMATOID FACTOR (HCC): ICD-10-CM

## 2023-03-30 DIAGNOSIS — T88.7XXA SIDE EFFECT OF MEDICATION: ICD-10-CM

## 2023-03-30 DIAGNOSIS — Z13.0 SCREENING FOR DEFICIENCY ANEMIA: ICD-10-CM

## 2023-03-30 DIAGNOSIS — Z13.220 LIPID SCREENING: ICD-10-CM

## 2023-03-30 DIAGNOSIS — I10 BENIGN ESSENTIAL HYPERTENSION: ICD-10-CM

## 2023-03-30 DIAGNOSIS — R68.81 EARLY SATIETY: ICD-10-CM

## 2023-03-30 DIAGNOSIS — M81.0 AGE RELATED OSTEOPOROSIS, UNSPECIFIED PATHOLOGICAL FRACTURE PRESENCE: ICD-10-CM

## 2023-03-30 DIAGNOSIS — R00.2 INTERMITTENT PALPITATIONS: ICD-10-CM

## 2023-03-30 DIAGNOSIS — R14.0 ABDOMINAL BLOATING: ICD-10-CM

## 2023-03-30 DIAGNOSIS — Z00.00 ANNUAL PHYSICAL EXAM: Primary | ICD-10-CM

## 2023-03-30 DIAGNOSIS — K21.9 GASTROESOPHAGEAL REFLUX DISEASE, UNSPECIFIED WHETHER ESOPHAGITIS PRESENT: ICD-10-CM

## 2023-03-30 DIAGNOSIS — Z87.81 HISTORY OF FRACTURE DUE TO FALL: ICD-10-CM

## 2023-03-30 DIAGNOSIS — R00.2 PALPITATIONS: ICD-10-CM

## 2023-03-30 DIAGNOSIS — Z87.81 HISTORY OF FRACTURE OF FEMUR: ICD-10-CM

## 2023-03-30 DIAGNOSIS — I25.2 HISTORY OF NON-ST ELEVATION MYOCARDIAL INFARCTION (NSTEMI): ICD-10-CM

## 2023-03-30 DIAGNOSIS — Z13.29 THYROID DISORDER SCREEN: ICD-10-CM

## 2023-03-30 DIAGNOSIS — M05.742 RHEUMATOID ARTHRITIS INVOLVING BOTH HANDS WITH POSITIVE RHEUMATOID FACTOR (HCC): ICD-10-CM

## 2023-03-30 DIAGNOSIS — L30.9 HAND DERMATITIS: ICD-10-CM

## 2023-03-30 DIAGNOSIS — T07.XXXS: ICD-10-CM

## 2023-03-30 DIAGNOSIS — M25.552 LEFT HIP PAIN: ICD-10-CM

## 2023-03-30 PROBLEM — S42.201A CLOSED FRACTURE OF RIGHT PROXIMAL HUMERUS: Status: RESOLVED | Noted: 2020-02-15 | Resolved: 2023-03-30

## 2023-03-30 PROBLEM — L03.113 CELLULITIS OF RIGHT HAND: Status: RESOLVED | Noted: 2022-05-10 | Resolved: 2023-03-30

## 2023-03-30 PROBLEM — I21.4 NSTEMI (NON-ST ELEVATED MYOCARDIAL INFARCTION) (HCC): Status: RESOLVED | Noted: 2020-02-16 | Resolved: 2023-03-30

## 2023-03-30 PROBLEM — M85.80 OSTEOPENIA: Status: RESOLVED | Noted: 2017-10-23 | Resolved: 2023-03-30

## 2023-03-30 RX ORDER — AMMONIUM LACTATE 12 G/100G
LOTION TOPICAL 2 TIMES DAILY PRN
Qty: 225 G | Refills: 1 | Status: SHIPPED | OUTPATIENT
Start: 2023-03-30

## 2023-03-30 RX ORDER — OMEPRAZOLE 20 MG/1
20 CAPSULE, DELAYED RELEASE ORAL DAILY
Qty: 30 CAPSULE | Refills: 0 | Status: SHIPPED | OUTPATIENT
Start: 2023-03-30

## 2023-03-30 RX ORDER — ASPIRIN 81 MG/1
81 TABLET ORAL DAILY
COMMUNITY

## 2023-03-30 RX ORDER — OMEPRAZOLE 20 MG/1
20 CAPSULE, DELAYED RELEASE ORAL DAILY
Qty: 90 CAPSULE | Refills: 1 | Status: CANCELLED | OUTPATIENT
Start: 2023-03-30

## 2023-03-30 RX ORDER — SYRINGE-NEEDLE,INSULIN,0.5 ML 28GX1/2"
SYRINGE, EMPTY DISPOSABLE MISCELLANEOUS
COMMUNITY
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
## 2023-03-30 NOTE — PATIENT INSTRUCTIONS
Wellness Visit for Adults   AMBULATORY CARE:   A wellness visit  is when you see your healthcare provider to get screened for health problems  Your healthcare provider will also give you advice on how to stay healthy  Write down your questions so you remember to ask them  Ask your healthcare provider how often you should have a wellness visit  What happens at a wellness visit:  Your healthcare provider will ask about your health, and your family history of health problems  This includes high blood pressure, heart disease, and cancer  He or she will ask if you have symptoms that concern you, if you smoke, and about your mood  You may also be asked about your intake of medicines, supplements, food, and alcohol  Any of the following may be done:  • Your weight  will be checked  Your height may also be checked so your body mass index (BMI) can be calculated  Your BMI shows if you are at a healthy weight  • Your blood pressure  and heart rate will be checked  Your temperature may also be checked  • Blood and urine tests  may be done  Blood tests may be done to check your cholesterol levels  Abnormal cholesterol levels increase your risk for heart disease and stroke  You may also need a blood or urine test to check for diabetes if you are at increased risk  Urine tests may be done to look for signs of an infection or kidney disease  • A physical exam  includes checking your heartbeat and lungs with a stethoscope  Your healthcare provider may also check your skin to look for sun damage  • Screening tests  may be recommended  A screening test is done to check for diseases that may not cause symptoms  The screening tests you may need depend on your age, gender, family history, and lifestyle habits  For example, colorectal screening may be recommended if you are 48years old or older  Screening tests you need if you are a woman:   • A Pap smear  is used to screen for cervical cancer   Pap smears are usually done every 3 to 5 years depending on your age  You may need them more often if you have had abnormal Pap smear test results in the past  Ask your healthcare provider how often you should have a Pap smear  • A mammogram  is an x-ray of your breasts to screen for breast cancer  Experts recommend mammograms every 2 years starting at age 48 years  You may need a mammogram at age 52 years or younger if you have an increased risk for breast cancer  Talk to your healthcare provider about when you should start having mammograms and how often you need them  Vaccines you may need:   • Get an influenza vaccine  every year  The influenza vaccine protects you from the flu  Several types of viruses cause the flu  The viruses change over time, so new vaccines are made each year  • Get a tetanus-diphtheria (Td) booster vaccine  every 10 years  This vaccine protects you against tetanus and diphtheria  Tetanus is a severe infection that may cause painful muscle spasms and lockjaw  Diphtheria is a severe bacterial infection that causes a thick covering in the back of your mouth and throat  • Get a human papillomavirus (HPV) vaccine  if you are female and aged 23 to 32 or male 23 to 24 and never received it  This vaccine protects you from HPV infection  HPV is the most common infection spread by sexual contact  HPV may also cause vaginal, penile, and anal cancers  • Get a pneumococcal vaccine  if you are aged 72 years or older  The pneumococcal vaccine is an injection given to protect you from pneumococcal disease  Pneumococcal disease is an infection caused by pneumococcal bacteria  The infection may cause pneumonia, meningitis, or an ear infection  • Get a shingles vaccine  if you are 60 or older, even if you have had shingles before  The shingles vaccine is an injection to protect you from the varicella-zoster virus  This is the same virus that causes chickenpox   Shingles is a painful rash that develops in people who had chickenpox or have been exposed to the virus  How to eat healthy:  My Plate is a model for planning healthy meals  It shows the types and amounts of foods that should go on your plate  Fruits and vegetables make up about half of your plate, and grains and protein make up the other half  A serving of dairy is included on the side of your plate  The amount of calories and serving sizes you need depends on your age, gender, weight, and height  Examples of healthy foods are listed below:  • Eat a variety of vegetables  such as dark green, red, and orange vegetables  You can also include canned vegetables low in sodium (salt) and frozen vegetables without added butter or sauces  • Eat a variety of fresh fruits , canned fruit in 100% juice, frozen fruit, and dried fruit  • Include whole grains  At least half of the grains you eat should be whole grains  Examples include whole-wheat bread, wheat pasta, brown rice, and whole-grain cereals such as oatmeal     • Eat a variety of protein foods such as seafood (fish and shellfish), lean meat, and poultry without skin (turkey and chicken)  Examples of lean meats include pork leg, shoulder, or tenderloin, and beef round, sirloin, tenderloin, and extra lean ground beef  Other protein foods include eggs and egg substitutes, beans, peas, soy products, nuts, and seeds  • Choose low-fat dairy products such as skim or 1% milk or low-fat yogurt, cheese, and cottage cheese  • Limit unhealthy fats  such as butter, hard margarine, and shortening  Exercise:  Exercise at least 30 minutes per day on most days of the week  Some examples of exercise include walking, biking, dancing, and swimming  You can also fit in more physical activity by taking the stairs instead of the elevator or parking farther away from stores  Include muscle strengthening activities 2 days each week  Regular exercise provides many health benefits   It helps you manage your weight, and decreases your risk for type 2 diabetes, heart disease, stroke, and high blood pressure  Exercise can also help improve your mood  Ask your healthcare provider about the best exercise plan for you  General health and safety guidelines:   • Do not smoke  Nicotine and other chemicals in cigarettes and cigars can cause lung damage  Ask your healthcare provider for information if you currently smoke and need help to quit  E-cigarettes or smokeless tobacco still contain nicotine  Talk to your healthcare provider before you use these products  • Limit alcohol  A drink of alcohol is 12 ounces of beer, 5 ounces of wine, or 1½ ounces of liquor  • Lose weight, if needed  Being overweight increases your risk of certain health conditions  These include heart disease, high blood pressure, type 2 diabetes, and certain types of cancer  • Protect your skin  Do not sunbathe or use tanning beds  Use sunscreen with a SPF 15 or higher  Apply sunscreen at least 15 minutes before you go outside  Reapply sunscreen every 2 hours  Wear protective clothing, hats, and sunglasses when you are outside  • Drive safely  Always wear your seatbelt  Make sure everyone in your car wears a seatbelt  A seatbelt can save your life if you are in an accident  Do not use your cell phone when you are driving  This could distract you and cause an accident  Pull over if you need to make a call or send a text message  • Practice safe sex  Use latex condoms if are sexually active and have more than one partner  Your healthcare provider may recommend screening tests for sexually transmitted infections (STIs)  • Wear helmets, lifejackets, and protective gear  Always wear a helmet when you ride a bike or motorcycle, go skiing, or play sports that could cause a head injury  Wear protective equipment when you play sports  Wear a lifejacket when you are on a boat or doing water sports      © Copyright [employer] 2022 Information is for End User's use only and may not be sold, redistributed or otherwise used for commercial purposes  The above information is an  only  It is not intended as medical advice for individual conditions or treatments  Talk to your doctor, nurse or pharmacist before following any medical regimen to see if it is safe and effective for you  Fall Prevention   AMBULATORY CARE:   Fall prevention  includes ways to make your home and other areas safer  It also includes ways you can move more carefully to prevent a fall  Health conditions that cause changes in your blood pressure, vision, or muscle strength and coordination may increase your risk for falls  Medicines may also increase your risk for falls if they make you dizzy, weak, or sleepy  Call your local emergency number (911 in the [address] [facility]) or have someone call if:   • You have fallen and are unconscious  • You have fallen and cannot move part of your body  Call your doctor if:   • You have fallen and have pain or a headache  • You have questions or concerns about your condition or care  Fall prevention tips:   • Stand or sit up slowly  This may help you keep your balance and prevent falls  • Use assistive devices as directed  Your healthcare provider may suggest that you use a cane or walker to help you keep your balance  You may need to have grab bars put in your bathroom near the toilet or in the shower  • Wear shoes that fit well and have soles that   Wear shoes both inside and outside  Use slippers with good   Do not wear shoes with high heels  • Wear a personal alarm  This is a device that allows you to call 911 if you fall and need help  Ask your healthcare provider for more information  • Stay active  Exercise can help strengthen your muscles and improve your balance  Your healthcare provider may recommend water aerobics or walking  He or she may also recommend physical therapy to improve your coordination   Never start an exercise program without talking to your healthcare provider first          • Manage your medical conditions  Keep all appointments with your healthcare providers  Visit your eye doctor as directed  Home safety tips:       • Add items to prevent falls in the bathroom  Put nonslip strips on your bath or shower floor to prevent you from slipping  Use a bath mat if you do not have carpet in the bathroom  This will prevent you from falling when you step out of the bath or shower  Use a shower seat so you do not need to stand while you shower  Sit on the toilet or a chair in your bathroom to dry yourself and put on clothing  This will prevent you from losing your balance from drying or dressing yourself while you are standing  • Keep paths clear  Remove books, shoes, and other objects from walkways and stairs  Place cords for telephones and lamps out of the way so that you do not need to walk over them  Tape them down if you cannot move them  Remove small rugs  If you cannot remove a rug, secure it with double-sided tape  This will prevent you from tripping  • Install bright lights in your home  Use night lights to help light paths to the bathroom or kitchen  Always turn on the light before you start walking  • Keep items you use often on shelves within reach  Do not use a step stool to help you reach an item  • Paint or place reflective tape on the edges of your stairs  This will help you see the stairs better  Follow up with your doctor as directed:  Write down your questions so you remember to ask them during your visits  © Copyright Jannie Peñaloza 2022 Information is for End User's use only and may not be sold, redistributed or otherwise used for commercial purposes  The above information is an  only  It is not intended as medical advice for individual conditions or treatments   Talk to your doctor, nurse or pharmacist before following any medical regimen to see if it is safe and effective for you

## 2023-04-02 PROBLEM — L30.9 HAND DERMATITIS: Status: ACTIVE | Noted: 2023-04-02

## 2023-04-02 PROBLEM — R68.81 EARLY SATIETY: Status: ACTIVE | Noted: 2023-04-02

## 2023-04-02 PROBLEM — R14.0 ABDOMINAL BLOATING: Status: ACTIVE | Noted: 2023-04-02

## 2023-04-02 PROBLEM — T07.XXXS UNSPECIFIED MULTIPLE INJURIES, SEQUELA: Status: ACTIVE | Noted: 2023-04-02

## 2023-04-03 NOTE — TELEPHONE ENCOUNTER
Called pt relayed message, pt scheduled appointment Consent: The patient's consent was obtained including but not limited to risks of crusting, scabbing, blistering, scarring, darker or lighter pigmentary change, recurrence, incomplete removal and infection. Detail Level: Detailed Post-Care Instructions: I reviewed with the patient in detail post-care instructions. Patient is to wear sunprotection, and avoid picking at any of the treated lesions. Pt may apply Vaseline to crusted or scabbing areas.  Patient made aware that some lesions may take more than one treatment to fully resolve and some lesion may recur.  Patient was instructed to return to the office if lesions are not resolved after 2 months. Show Applicator Variable?: Yes Number Of Freeze-Thaw Cycles: 1 freeze-thaw cycle Duration Of Freeze Thaw-Cycle (Seconds): 5 Render Note In Bullet Format When Appropriate: No

## 2023-04-23 DIAGNOSIS — K21.9 GASTROESOPHAGEAL REFLUX DISEASE, UNSPECIFIED WHETHER ESOPHAGITIS PRESENT: ICD-10-CM

## 2023-04-24 RX ORDER — OMEPRAZOLE 20 MG/1
CAPSULE, DELAYED RELEASE ORAL
Qty: 30 CAPSULE | Refills: 0 | Status: SHIPPED | OUTPATIENT
Start: 2023-04-24

## 2023-05-15 DIAGNOSIS — I10 BENIGN ESSENTIAL HYPERTENSION: ICD-10-CM

## 2023-05-15 RX ORDER — DOXAZOSIN 2 MG/1
TABLET ORAL
Qty: 90 TABLET | Refills: 0 | Status: SHIPPED | OUTPATIENT
Start: 2023-05-15 | End: 2023-05-18 | Stop reason: SDUPTHER

## 2023-05-18 ENCOUNTER — OFFICE VISIT (OUTPATIENT)
Dept: FAMILY MEDICINE CLINIC | Facility: CLINIC | Age: 78
End: 2023-05-18

## 2023-05-18 VITALS
OXYGEN SATURATION: 99 % | SYSTOLIC BLOOD PRESSURE: 138 MMHG | HEART RATE: 51 BPM | HEIGHT: 64 IN | DIASTOLIC BLOOD PRESSURE: 70 MMHG | BODY MASS INDEX: 21.89 KG/M2 | WEIGHT: 128.2 LBS | TEMPERATURE: 96 F

## 2023-05-18 DIAGNOSIS — M25.552 LEFT HIP PAIN: Primary | ICD-10-CM

## 2023-05-18 DIAGNOSIS — Z78.0 POSTMENOPAUSAL: ICD-10-CM

## 2023-05-18 DIAGNOSIS — M16.10 HIP ARTHRITIS: ICD-10-CM

## 2023-05-18 DIAGNOSIS — I10 BENIGN ESSENTIAL HYPERTENSION: ICD-10-CM

## 2023-05-18 DIAGNOSIS — K21.9 GASTROESOPHAGEAL REFLUX DISEASE, UNSPECIFIED WHETHER ESOPHAGITIS PRESENT: ICD-10-CM

## 2023-05-18 DIAGNOSIS — M81.0 AGE RELATED OSTEOPOROSIS, UNSPECIFIED PATHOLOGICAL FRACTURE PRESENCE: ICD-10-CM

## 2023-05-18 DIAGNOSIS — Z12.31 BREAST CANCER SCREENING BY MAMMOGRAM: ICD-10-CM

## 2023-05-18 DIAGNOSIS — D64.9 ANEMIA, UNSPECIFIED TYPE: ICD-10-CM

## 2023-05-18 DIAGNOSIS — E87.1 HYPONATREMIA: ICD-10-CM

## 2023-05-18 RX ORDER — DOXAZOSIN 2 MG/1
2 TABLET ORAL DAILY
Qty: 90 TABLET | Refills: 0 | Status: SHIPPED | OUTPATIENT
Start: 2023-05-18

## 2023-05-18 RX ORDER — OMEPRAZOLE 20 MG/1
20 CAPSULE, DELAYED RELEASE ORAL DAILY
Qty: 30 CAPSULE | Refills: 0 | Status: SHIPPED | OUTPATIENT
Start: 2023-05-18

## 2023-05-18 NOTE — PROGRESS NOTES
"Name: Hua Sepulveda      : 1945      MRN: [id number]  Encounter Provider: Daryl Ha DO  Encounter Date: 2023   Encounter department: [address] [place] [address]     1  Left hip pain  Comments:  xray shows arthritis    2  Hip arthritis  Comments:  pt defers PT for hip pain  I advised trial OTC lidocaine patch    3  Anemia, unspecified type  Comments:  mild -per chart review first detected during hospital visit 2022 - similar CBC result now - I advsied trial OTC iron and recheck in about a month  Orders:  -     CBC and differential; Future; Expected date: 2023    4  Hyponatremia  Comments:  mild; pt states she hardly drinks much water- will monitor/recheck  Orders:  -     Basic metabolic panel; Future; Expected date: 2023    5  Benign essential hypertension  -     doxazosin (CARDURA) 2 mg tablet; Take 1 tablet (2 mg total) by mouth daily    6  Gastroesophageal reflux disease, unspecified whether esophagitis present  -     omeprazole (PriLOSEC) 20 mg delayed release capsule; Take 1 capsule (20 mg total) by mouth daily    7  Breast cancer screening by mammogram  -     Mammo screening bilateral w 3d & cad; Future; Expected date: 2023    8  Age related osteoporosis, unspecified pathological fracture presence  -     DXA bone density spine hip and pelvis; Future; Expected date: 2023    9  Postmenopausal  -     DXA bone density spine hip and pelvis; Future; Expected date: 2023    pt defers PT for hip pain  I advised trial OTC lidocaine patch    Depression Screening and Follow-up Plan: Patient was screened for depression during today's encounter  They screened negative with a PHQ-2 score of 2          Subjective  Chief Complaint   Patient presents with   • Follow-up         Short interval f/u - pt's last appt  was scheduled as routine physical, but pt had numerous complaints \"most important\" of which was left hip pain - testing was ordered " "including hip/pelvic xrays which showed arthritis, no fractures  Pt reports pain unchanged    Review of Systems    Current Outpatient Medications on File Prior to Visit   Medication Sig   • Acetaminophen (TYLENOL ARTHRITIS PAIN PO) Take by mouth   • ammonium lactate (LAC-HYDRIN) 12 % lotion Apply topically 2 (two) times a day as needed for dry skin   • aspirin (ECOTRIN LOW STRENGTH) 81 mg EC tablet Take 81 mg by mouth daily Takes as needed   • Cholecalciferol (VITAMIN D3) 50 MCG (2000 UT) TABS Take 2,000 Units by mouth daily   • fluticasone (FLONASE) 50 mcg/act nasal spray into each nostril   • hydrocortisone 2 5 % cream Apply topically 4 (four) times a day as needed   • multivitamin (THERAGRAN) TABS Take 1 tablet by mouth in the morning  • Tuberculin-Allergy Syringes (ALLERGY SYRINGE 1CC/27GX1/2\") 27G X 1/2\" 1 ML MISC Use Receives allergy injections once a month through allergist   • vitamin B-12 (CYANOCOBALAMIN) 100 MCG TABS Take 50 mcg by mouth daily   • VITAMIN D PO Take by mouth   • acetaminophen (TYLENOL) 325 mg tablet Take 650 mg by mouth every 6 (six) hours as needed for mild pain (Patient not taking: Reported on 5/18/2023)       Objective     /70 (BP Location: Left arm, Patient Position: Sitting, Cuff Size: Standard)   Pulse (!) 51   Temp (!) 96 °F (35 6 °C) (Tympanic)   Ht 5' 4\" (1 626 m)   Wt 58 2 kg (128 lb 3 2 oz)   SpO2 99%   BMI 22 01 kg/m²     Physical Exam  Vitals and nursing note reviewed  Constitutional:       General: She is not in acute distress  Appearance: She is well-developed  She is not ill-appearing, toxic-appearing or diaphoretic  Comments: Wearing dark sunglasses throughout visit   HENT:      Head: Normocephalic and atraumatic  Mouth/Throat:      Pharynx: Uvula midline  Neck:      Trachea: Phonation normal    Pulmonary:      Effort: Pulmonary effort is normal    Skin:     General: Skin is warm and dry  Coloration: Skin is not pale     Neurological:     " Mental Status: She is alert and oriented to person, place, and time  Psychiatric:         Mood and Affect: Mood normal          Behavior: Behavior is cooperative         Faiza Bean DO

## 2023-05-22 ENCOUNTER — OFFICE VISIT (OUTPATIENT)
Dept: FAMILY MEDICINE CLINIC | Facility: CLINIC | Age: 78
End: 2023-05-22

## 2023-05-22 ENCOUNTER — APPOINTMENT (OUTPATIENT)
Dept: RADIOLOGY | Facility: CLINIC | Age: 78
End: 2023-05-22

## 2023-05-22 VITALS
HEIGHT: 64 IN | SYSTOLIC BLOOD PRESSURE: 150 MMHG | BODY MASS INDEX: 21.85 KG/M2 | TEMPERATURE: 98.2 F | OXYGEN SATURATION: 99 % | WEIGHT: 128 LBS | DIASTOLIC BLOOD PRESSURE: 88 MMHG | HEART RATE: 70 BPM

## 2023-05-22 DIAGNOSIS — R26.89 LIMPING: ICD-10-CM

## 2023-05-22 DIAGNOSIS — S81.802A WOUND OF LEFT LOWER EXTREMITY, INITIAL ENCOUNTER: ICD-10-CM

## 2023-05-22 DIAGNOSIS — L02.416 CELLULITIS AND ABSCESS OF LEFT LEG: ICD-10-CM

## 2023-05-22 DIAGNOSIS — S51.801A WOUND, OPEN, ARM, FOREARM, RIGHT, INITIAL ENCOUNTER: ICD-10-CM

## 2023-05-22 DIAGNOSIS — L03.116 CELLULITIS AND ABSCESS OF LEFT LEG: ICD-10-CM

## 2023-05-22 DIAGNOSIS — S81.801S WOUND OF RIGHT LEG, SEQUELA: ICD-10-CM

## 2023-05-22 DIAGNOSIS — T14.8XXA: ICD-10-CM

## 2023-05-22 DIAGNOSIS — T14.8XXA: Primary | ICD-10-CM

## 2023-05-22 RX ORDER — AMOXICILLIN AND CLAVULANATE POTASSIUM 875; 125 MG/1; MG/1
1 TABLET, FILM COATED ORAL EVERY 12 HOURS SCHEDULED
Qty: 20 TABLET | Refills: 0 | Status: SHIPPED | OUTPATIENT
Start: 2023-05-22 | End: 2023-06-01
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
## 2023-05-24 ENCOUNTER — OFFICE VISIT (OUTPATIENT)
Dept: FAMILY MEDICINE CLINIC | Facility: CLINIC | Age: 78
End: 2023-05-24

## 2023-05-24 ENCOUNTER — APPOINTMENT (EMERGENCY)
Dept: RADIOLOGY | Facility: HOSPITAL | Age: 78
End: 2023-05-24

## 2023-05-24 ENCOUNTER — HOSPITAL ENCOUNTER (EMERGENCY)
Facility: HOSPITAL | Age: 78
Discharge: HOME | End: 2023-05-24
Attending: EMERGENCY MEDICINE

## 2023-05-24 VITALS
OXYGEN SATURATION: 98 % | SYSTOLIC BLOOD PRESSURE: 126 MMHG | DIASTOLIC BLOOD PRESSURE: 58 MMHG | BODY MASS INDEX: 21.82 KG/M2 | TEMPERATURE: 98.9 F | HEART RATE: 71 BPM | WEIGHT: 127.8 LBS | HEIGHT: 64 IN

## 2023-05-24 VITALS
BODY MASS INDEX: 21.34 KG/M2 | SYSTOLIC BLOOD PRESSURE: 175 MMHG | DIASTOLIC BLOOD PRESSURE: 80 MMHG | RESPIRATION RATE: 16 BRPM | WEIGHT: 125 LBS | HEIGHT: 64 IN | TEMPERATURE: 100.4 F | HEART RATE: 74 BPM | OXYGEN SATURATION: 97 %

## 2023-05-24 DIAGNOSIS — L03.116 CELLULITIS AND ABSCESS OF LEFT LEG: ICD-10-CM

## 2023-05-24 DIAGNOSIS — S81.802D WOUND OF LEFT LOWER EXTREMITY, SUBSEQUENT ENCOUNTER: Primary | ICD-10-CM

## 2023-05-24 DIAGNOSIS — L02.416 CELLULITIS AND ABSCESS OF LEFT LEG: ICD-10-CM
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
## 2023-05-24 LAB
ALBUMIN SERPL BCP-MCNC: 3.8 G/DL (ref 3.5–5)
ALP SERPL-CCNC: 131 U/L (ref 34–104)
ALT SERPL W P-5'-P-CCNC: 16 U/L (ref 7–52)
ANION GAP SERPL CALCULATED.3IONS-SCNC: 8 MMOL/L (ref 4–13)
APTT PPP: 29 SECONDS (ref 23–37)
AST SERPL W P-5'-P-CCNC: 15 U/L (ref 13–39)
BASOPHILS # BLD AUTO: 0.03 THOUSANDS/ÂΜL (ref 0–0.1)
BASOPHILS NFR BLD AUTO: 0 % (ref 0–1)
BILIRUB SERPL-MCNC: 0.28 MG/DL (ref 0.2–1)
BUN SERPL-MCNC: 19 MG/DL (ref 5–25)
CALCIUM SERPL-MCNC: 9 MG/DL (ref 8.4–10.2)
CHLORIDE SERPL-SCNC: 104 MMOL/L (ref 96–108)
CO2 SERPL-SCNC: 23 MMOL/L (ref 21–32)
CREAT SERPL-MCNC: 1.29 MG/DL (ref 0.6–1.3)
EOSINOPHIL # BLD AUTO: 0.07 THOUSAND/ÂΜL (ref 0–0.61)
EOSINOPHIL NFR BLD AUTO: 1 % (ref 0–6)
ERYTHROCYTE [DISTWIDTH] IN BLOOD BY AUTOMATED COUNT: 11.2 % (ref 11.6–15.1)
GFR SERPL CREATININE-BSD FRML MDRD: 39 ML/MIN/1.73SQ M
GLUCOSE SERPL-MCNC: 99 MG/DL (ref 65–140)
HCT VFR BLD AUTO: 31.9 % (ref 34.8–46.1)
HGB BLD-MCNC: 10.5 G/DL (ref 11.5–15.4)
IMM GRANULOCYTES # BLD AUTO: 0.02 THOUSAND/UL (ref 0–0.2)
IMM GRANULOCYTES NFR BLD AUTO: 0 % (ref 0–2)
INR PPP: 0.93 (ref 0.84–1.19)
LACTATE SERPL-SCNC: 1 MMOL/L (ref 0.5–2)
LYMPHOCYTES # BLD AUTO: 0.88 THOUSANDS/ÂΜL (ref 0.6–4.47)
LYMPHOCYTES NFR BLD AUTO: 11 % (ref 14–44)
MCH RBC QN AUTO: 32.7 PG (ref 26.8–34.3)
MCHC RBC AUTO-ENTMCNC: 32.9 G/DL (ref 31.4–37.4)
MCV RBC AUTO: 99 FL (ref 82–98)
MONOCYTES # BLD AUTO: 0.58 THOUSAND/ÂΜL (ref 0.17–1.22)
MONOCYTES NFR BLD AUTO: 7 % (ref 4–12)
NEUTROPHILS # BLD AUTO: 6.28 THOUSANDS/ÂΜL (ref 1.85–7.62)
NEUTS SEG NFR BLD AUTO: 81 % (ref 43–75)
NRBC BLD AUTO-RTO: 0 /100 WBCS
PLATELET # BLD AUTO: 280 THOUSANDS/UL (ref 149–390)
PMV BLD AUTO: 10.5 FL (ref 8.9–12.7)
POTASSIUM SERPL-SCNC: 4.4 MMOL/L (ref 3.5–5.3)
PROCALCITONIN SERPL-MCNC: 0.08 NG/ML
PROT SERPL-MCNC: 6.8 G/DL (ref 6.4–8.4)
PROTHROMBIN TIME: 12.5 SECONDS (ref 11.6–14.5)
RBC # BLD AUTO: 3.21 MILLION/UL (ref 3.81–5.12)
SODIUM SERPL-SCNC: 135 MMOL/L (ref 135–147)
WBC # BLD AUTO: 7.86 THOUSAND/UL (ref 4.31–10.16)

## 2023-05-24 RX ORDER — HYDROMORPHONE HCL/PF 1 MG/ML
0.5 SYRINGE (ML) INJECTION ONCE
Status: COMPLETED | OUTPATIENT
Start: 2023-05-24 | End: 2023-05-24

## 2023-05-24 RX ADMIN — HYDROMORPHONE HYDROCHLORIDE 0.5 MG: 1 INJECTION, SOLUTION INTRAMUSCULAR; INTRAVENOUS; SUBCUTANEOUS at 20:13

## 2023-05-24 RX ADMIN — SODIUM CHLORIDE 3 G: 9 INJECTION, SOLUTION INTRAVENOUS at 18:56

## 2023-05-24 NOTE — PROGRESS NOTES
Name: Luis Angel Lawrence      : 1945      MRN: [id number]  Encounter Provider: Nate Bowens DO  Encounter Date: 2023   Encounter department: [address] [place] [address]     1  Wound of left lower extremity, subsequent encounter  -     Transfer to other facility    2  Cellulitis and abscess of left leg  -     Transfer to other facility    still significant erythema and pain at left leg wound despite 2 days oral abx- needs eval at ER, possibly admission for IV abx       Subjective      Chief Complaint   Patient presents with   • Follow-up     2 day follow up       Short interval f/u - seen here 2 days ago for same day sick visit - deep left leg wounds with cellulitis  Day # on augmentin  Pain no better per pt  Await xray reading left tib/fib  Erythema and drainage no change on exam, still heat and swelling present  Pt had hx needing IV abx for infection skin of hand in the past- I advise ER    Review of Systems    Current Outpatient Medications on File Prior to Visit   Medication Sig   • Acetaminophen (TYLENOL ARTHRITIS PAIN PO) Take by mouth   • ammonium lactate (LAC-HYDRIN) 12 % lotion Apply topically 2 (two) times a day as needed for dry skin   • amoxicillin-clavulanate (Augmentin) 875-125 mg per tablet Take 1 tablet by mouth every 12 (twelve) hours for 10 days   • aspirin (ECOTRIN LOW STRENGTH) 81 mg EC tablet Take 81 mg by mouth daily Takes as needed   • Cholecalciferol (VITAMIN D3) 50 MCG (2000 UT) TABS Take 2,000 Units by mouth daily   • doxazosin (CARDURA) 2 mg tablet Take 1 tablet (2 mg total) by mouth daily   • fluticasone (FLONASE) 50 mcg/act nasal spray into each nostril   • hydrocortisone 2 5 % cream Apply topically 4 (four) times a day as needed   • multivitamin (THERAGRAN) TABS Take 1 tablet by mouth in the morning     • omeprazole (PriLOSEC) 20 mg delayed release capsule Take 1 capsule (20 mg total) by mouth daily   • Tuberculin-Allergy Syringes (ALLERGY SYRINGE "1CC/27GX1/2\") 27G X 1/2\" 1 ML MISC Use Receives allergy injections once a month through allergist   • vitamin B-12 (CYANOCOBALAMIN) 100 MCG TABS Take 50 mcg by mouth daily   • VITAMIN D PO Take by mouth   • acetaminophen (TYLENOL) 325 mg tablet Take 650 mg by mouth every 6 (six) hours as needed for mild pain (Patient not taking: Reported on 5/18/2023)       Objective     /58 (BP Location: Left arm, Patient Position: Sitting, Cuff Size: Standard)   Pulse 71   Temp 98 9 °F (37 2 °C) (Tympanic)   Ht 5' 4\" (1 626 m)   Wt 58 kg (127 lb 12 8 oz)   SpO2 98%   BMI 21 94 kg/m²     Physical Exam  Vitals and nursing note reviewed  Constitutional:       General: She is not in acute distress  Appearance: She is well-developed  She is not ill-appearing, toxic-appearing or diaphoretic  HENT:      Head: Normocephalic and atraumatic  Mouth/Throat:      Pharynx: Uvula midline  Neck:      Trachea: Phonation normal    Pulmonary:      Effort: Pulmonary effort is normal    Skin:     General: Skin is warm and dry  Coloration: Skin is not pale  Neurological:      Mental Status: She is alert and oriented to person, place, and time  Psychiatric:         Mood and Affect: Mood is anxious  Behavior: Behavior is cooperative         Shellie Maurice DO  "

## 2023-05-24 NOTE — ED TRIAGE NOTES
"Via [facility] w/complaint of cat bite to lower left leg on Friday; cat belongs to pt and is up to date on vaccines; evaluated by PCP, placed on antibiotic & taking as prescribed   PCP sent pt to ED for further evaluation; states wound is not getting worse \"but it's not getting better\"; denies N/V  "

## 2023-05-24 NOTE — ED PROVIDER NOTES
"History  Chief Complaint   Patient presents with   • Animal Bite     [address]ia [address] w/complaint of cat bite to lower left leg on Friday; cat belongs to pt and is up to date on vaccines; evaluated by PCP, placed on antibiotic & taking as prescribed  PCP sent pt to ED for further evaluation; states wound is not getting worse \"but it's not getting better\"; denies N/V     Patient is a 74-year-old female presents for evaluation of a cat bite to her left lower leg  Patient says that she was bit on Friday  She saw her family doctor on Monday and was started on Augmentin  She has been on the antibiotics for 48 hours  There is no improvement in her wound so her family doctor sent her here for evaluation  She admits to pain in the left lower leg  She denies any significant worsening of the redness or swelling  There is no purulent drainage from the wound  Her cat is up-to-date on vaccinations  She had an x-ray done on 5-22 which showed no acute osseous abnormality  Prior to Admission Medications   Prescriptions Last Dose Informant Patient Reported? Taking?    Acetaminophen (TYLENOL ARTHRITIS PAIN PO)   Yes No   Sig: Take by mouth   Cholecalciferol (VITAMIN D3) 50 MCG (2000 UT) TABS  Self Yes No   Sig: Take 2,000 Units by mouth daily   Tuberculin-Allergy Syringes (ALLERGY SYRINGE 1CC/27GX1/2\") 27G X 1/2\" 1 ML MISC   Yes No   Sig: Use Receives allergy injections once a month through allergist   VITAMIN D PO   Yes No   Sig: Take by mouth   acetaminophen (TYLENOL) 325 mg tablet  Self Yes No   Sig: Take 650 mg by mouth every 6 (six) hours as needed for mild pain   Patient not taking: Reported on 5/18/2023   ammonium lactate (LAC-HYDRIN) 12 % lotion   No No   Sig: Apply topically 2 (two) times a day as needed for dry skin   amoxicillin-clavulanate (Augmentin) 875-125 mg per tablet   No No   Sig: Take 1 tablet by mouth every 12 (twelve) hours for 10 days   aspirin (ECOTRIN LOW STRENGTH) 81 mg EC tablet   Yes No   Sig: Take " 81 mg by mouth daily Takes as needed   doxazosin (CARDURA) 2 mg tablet   No No   Sig: Take 1 tablet (2 mg total) by mouth daily   fluticasone (FLONASE) 50 mcg/act nasal spray  Self Yes No   Sig: into each nostril   hydrocortisone 2 5 % cream   Yes No   Sig: Apply topically 4 (four) times a day as needed   multivitamin (THERAGRAN) TABS  Self Yes No   Sig: Take 1 tablet by mouth in the morning  omeprazole (PriLOSEC) 20 mg delayed release capsule   No No   Sig: Take 1 capsule (20 mg total) by mouth daily   vitamin B-12 (CYANOCOBALAMIN) 100 MCG TABS   Yes No   Sig: Take 50 mcg by mouth daily      Facility-Administered Medications: None       Past Medical History:   Diagnosis Date   • Cellulitis of right hand 5/10/2022   • Closed fracture of right proximal humerus 2/15/2020   • NSTEMI (non-ST elevated myocardial infarction) ([facility] [address] ) 2/16/2020   • Osteopenia 10/23/2017       Past Surgical History:   Procedure Laterality Date   • APPENDECTOMY     • CATARACT EXTRACTION     • LAPAROSCOPIC APPENDECTOMY      incidental    • ID TENDON SHEATH INCISION Left 2/26/2019    Procedure: LONG FINGER TRIGGER FINGER RELEASE;  Surgeon: Nathalia Torrez MD;  Location: [facility];  Service: Orthopedics   • TONSILLECTOMY     • TUBAL LIGATION         Family History   Problem Relation Age of Onset   • Lung cancer Mother    • Hypertension Mother    • Cancer Mother    • Cerebral aneurysm Father         bleed   • Alcohol abuse Father    • Cirrhosis Father    • Other Father         had fallen   • Other Sister         unkownn cause    • Pneumonia Brother         infancy of pneumonia   • Other Sister         in MVA     I have reviewed and agree with the history as documented      E-Cigarette/Vaping   • E-Cigarette Use Never User      E-Cigarette/Vaping Substances   • Nicotine No    • THC No    • CBD No    • Flavoring No    • Other No    • Unknown No      Social History     Tobacco Use   • Smoking status: Former     Packs/day: 1 00     Types: Cigarettes   • Smokeless tobacco: Never   • Tobacco comments:     age [age]    Vaping Use   • Vaping Use: Never used   Substance Use Topics   • Alcohol use: Yes     Alcohol/week: 10 0 standard drinks of alcohol     Types: 10 Glasses of wine per week   • Drug use: No       Review of Systems   Constitutional: Negative for chills, diaphoresis, fever and unexpected weight change  HENT: Negative for congestion, ear pain, sinus pressure, sore throat and trouble swallowing  Eyes: Negative for pain, discharge, redness and itching  Respiratory: Negative for cough, chest tightness, shortness of breath and wheezing  Cardiovascular: Negative for chest pain, palpitations and leg swelling  Gastrointestinal: Negative for abdominal distention, abdominal pain, blood in stool, diarrhea, nausea and vomiting  Endocrine: Negative for polyphagia and polyuria  Genitourinary: Negative for difficulty urinating, dysuria, flank pain, hematuria, pelvic pain and vaginal bleeding  Musculoskeletal: Negative for arthralgias, back pain and myalgias  Skin: Positive for wound (left lower leg)  Negative for color change and rash  Neurological: Negative for dizziness, syncope, weakness, light-headedness and headaches  Physical Exam  Physical Exam  Vitals and nursing note reviewed  Constitutional:       General: She is not in acute distress  Appearance: She is well-developed  HENT:      Head: Normocephalic and atraumatic  Right Ear: Tympanic membrane and external ear normal       Left Ear: Tympanic membrane and external ear normal       Nose: Nose normal       Mouth/Throat:      Mouth: Mucous membranes are moist       Pharynx: No oropharyngeal exudate  Eyes:      Conjunctiva/sclera: Conjunctivae normal       Pupils: Pupils are equal, round, and reactive to light  Cardiovascular:      Rate and Rhythm: Normal rate and regular rhythm  Heart sounds: Normal heart sounds  No murmur heard  No friction rub  No gallop  Pulmonary:      Effort: Pulmonary effort is normal  No respiratory distress  Breath sounds: Normal breath sounds  No wheezing or rales  Abdominal:      General: There is no distension  Palpations: Abdomen is soft  Tenderness: There is no abdominal tenderness  There is no guarding  Musculoskeletal:         General: No swelling, tenderness, deformity or signs of injury  Normal range of motion  Cervical back: Normal range of motion and neck supple  Legs:    Lymphadenopathy:      Cervical: No cervical adenopathy  Skin:     General: Skin is warm and dry  Findings: Erythema (left lower leg) present  Neurological:      General: No focal deficit present  Mental Status: She is alert and oriented to person, place, and time  Mental status is at baseline  Cranial Nerves: No cranial nerve deficit  Sensory: No sensory deficit  Motor: No weakness or abnormal muscle tone        Coordination: Coordination normal              Vital Signs  ED Triage Vitals [05/24/23 1828]   Temperature Pulse Respirations Blood Pressure SpO2   100 4 °F (38 °C) 73 18 (!) 142/43 96 %      Temp Source Heart Rate Source Patient Position - Orthostatic VS BP Location FiO2 (%)   Tympanic Monitor Sitting Left arm --      Pain Score       8           Vitals:    05/24/23 1828 05/24/23 2015   BP: (!) 142/43 (!) 175/80   Pulse: 73 74   Patient Position - Orthostatic VS: Sitting Lying         Visual Acuity      ED Medications  Medications   ampicillin-sulbactam (UNASYN) 3 g in sodium chloride 0 9 % 100 mL IVPB (0 g Intravenous Stopped 5/24/23 1951)   HYDROmorphone (DILAUDID) injection 0 5 mg (0 5 mg Intravenous Given 5/24/23 2013)       Diagnostic Studies  Results Reviewed     Procedure Component Value Units Date/Time    Procalcitonin [[id number]]  (Normal) Collected: 05/24/23 1853    Lab Status: Final result Specimen: Blood from Arm, Right Updated: 05/24/23 1928     Procalcitonin 0 08 ng/ml Comprehensive metabolic panel [[id number]]  (Abnormal) Collected: 05/24/23 1853    Lab Status: Final result Specimen: Blood from Arm, Right Updated: 05/24/23 1920     Sodium 135 mmol/L      Potassium 4 4 mmol/L      Chloride 104 mmol/L      CO2 23 mmol/L      ANION GAP 8 mmol/L      BUN 19 mg/dL      Creatinine 1 29 mg/dL      Glucose 99 mg/dL      Calcium 9 0 mg/dL      AST 15 U/L      ALT 16 U/L      Alkaline Phosphatase 131 U/L      Total Protein 6 8 g/dL      Albumin 3 8 g/dL      Total Bilirubin 0 28 mg/dL      eGFR 39 ml/min/1 73sq m     Narrative:      [place] guidelines for Chronic Kidney Disease (CKD):   •  Stage 1 with normal or high GFR (GFR > 90 mL/min/1 73 square meters)  •  Stage 2 Mild CKD (GFR = 60-89 mL/min/1 73 square meters)  •  Stage 3A Moderate CKD (GFR = 45-59 mL/min/1 73 square meters)  •  Stage 3B Moderate CKD (GFR = 30-44 mL/min/1 73 square meters)  •  Stage 4 Severe CKD (GFR = 15-29 mL/min/1 73 square meters)  •  Stage 5 End Stage CKD (GFR <15 mL/min/1 73 square meters)  Note: GFR calculation is accurate only with a steady state creatinine    Lactic acid [[id number]]  (Normal) Collected: 05/24/23 1853    Lab Status: Final result Specimen: Blood from Arm, Right Updated: 05/24/23 1919     LACTIC ACID 1 0 mmol/L     Narrative:      Result may be elevated if tourniquet was used during collection      Dorna Brain [[id number]]  (Normal) Collected: 05/24/23 1853    Lab Status: Final result Specimen: Blood from Arm, Right Updated: 05/24/23 1915     Protime 12 5 seconds      INR 0 93    APTT [[id number]]  (Normal) Collected: 05/24/23 1853    Lab Status: Final result Specimen: Blood from Arm, Right Updated: 05/24/23 1915     PTT 29 seconds     CBC and differential [[id number]]  (Abnormal) Collected: 05/24/23 1853    Lab Status: Final result Specimen: Blood from Arm, Right Updated: 05/24/23 1902     WBC 7 86 Thousand/uL      RBC 3 21 Million/uL      Hemoglobin 10 5 g/dL Hematocrit 31 9 %      MCV 99 fL      MCH 32 7 pg      MCHC 32 9 g/dL      RDW 11 2 %      MPV 10 5 fL      Platelets 606 Thousands/uL      nRBC 0 /100 WBCs      Neutrophils Relative 81 %      Immat GRANS % 0 %      Lymphocytes Relative 11 %      Monocytes Relative 7 %      Eosinophils Relative 1 %      Basophils Relative 0 %      Neutrophils Absolute 6 28 Thousands/µL      Immature Grans Absolute 0 02 Thousand/uL      Lymphocytes Absolute 0 88 Thousands/µL      Monocytes Absolute 0 58 Thousand/µL      Eosinophils Absolute 0 07 Thousand/µL      Basophils Absolute 0 03 Thousands/µL     Blood culture #1 [[id number]] Collected: 05/24/23 1853    Lab Status: In process Specimen: Blood from Arm, Left Updated: 05/24/23 1857    Blood culture #2 [[id number]] Collected: 05/24/23 1853    Lab Status: In process Specimen: Blood from Arm, Right Updated: 05/24/23 1857                 XR tibia fibula 2 views LEFT   ED Interpretation by Rei Taylor DO (05/24 2031)   No acute osseous abnormality  Procedures  Procedures         ED Course  ED Course as of 05/24/23 2117   Wed May 24, 2023   2004 Had discussion with patient and  bedside about lab work and imaging  Confirm with them that the wound has not gotten significantly worse over the last 48 hours  Spoke with them about the options which include continuing the Augmentin at home, following up with the family doctor/ED in the next 24 hours for recheck versus admitting for IV antibiotics  The patient feels comfortable going home and continuing the Augmentin  She understands to come back to the emergency department if symptoms acutely worsen                               SBIRT 22yo+    Flowsheet Row Most Recent Value   Initial Alcohol Screen: US AUDIT-C     1  How often do you have a drink containing alcohol? 0 Filed at: 05/24/2023 1831   2  How many drinks containing alcohol do you have on a typical day you are drinking? 0 Filed at: 05/24/2023 1831   3b  FEMALE Any Age, or MALE 65+: How often do you have 4 or more drinks on one occassion? 0 Filed at: 05/24/2023 1831   Audit-C Score 0 Filed at: 05/24/2023 1831   LEONARD: How many times in the past year have you    Used an illegal drug or used a prescription medication for non-medical reasons? Never Filed at: 05/24/2023 1831                    Medical Decision Making  18-year-old female presenting for reevaluation of a cat bite to her left lower leg  Bite occurred on Friday  Was started on Augmentin on Monday  Has been on Augmentin for 48 hours  Says wound has not worsened but has not improved  Temp of 100 4 on arrival   Rest of vitals within normal limits  We will obtain septic work-up given wound infection  Will obtain x-ray of left lower leg to rule out osseous abnormality or gas formation  CBC shows no leukocytosis  CMP shows no QUYEN, normal electrolytes [place] within normal limits  Lactic within normal limits  X-ray of the left lower leg shows no acute osseous abnormality or signs of gas formation  Given normal lab work, non worsening of symptoms, discussed with patient the options of going home with continued Augmentin use and PCP follow-up in the next day versus admission for IV antibiotics  Patient is comfortable going home at this time  Told to follow-up with her family doctor or return to the ED in the next 24 hours for symptom recheck  Told to return to the ED if symptoms acutely worsen  Cat bite, subsequent encounter: acute illness or injury  Amount and/or Complexity of Data Reviewed  Labs: ordered  Radiology: ordered and independent interpretation performed  Risk  Prescription drug management            Disposition  Final diagnoses:   Cat bite, subsequent encounter     Time reflects when diagnosis was documented in both MDM as applicable and the Disposition within this note     Time User Action Codes Description Comment    5/24/2023  8:13 PM Jens Moore [W55 [id number]] Cat bite, initial encounter     5/24/2023  8:13 PM Bernabe Richardt Remove [W55 [id number]] Cat bite, initial encounter     5/24/2023  8:13 PM Bernabe Patel Add [W55 01XD] Cat bite, subsequent encounter       ED Disposition     ED Disposition   Discharge    Condition   Stable    Date/Time   Wed May 24, 2023  8:13 PM    Comment   Madelin Nieves discharge to home/self care  Follow-up Information     Follow up With Specialties Details Why Contact Info Additional [address], DO Family Medicine Go in 1 day For wound re-check Cheryl Allen [address] [facility] Emergency Medicine Go in 1 day For wound re-check Sai Moffett [phone]  [facility] Emergency Department, [address] [facility] , [employer], [address] [facility]          Discharge Medication List as of 5/24/2023  8:14 PM      CONTINUE these medications which have NOT CHANGED    Details   Acetaminophen (TYLENOL ARTHRITIS PAIN PO) Take by mouth, Historical Med      acetaminophen (TYLENOL) 325 mg tablet Take 650 mg by mouth every 6 (six) hours as needed for mild pain, Historical Med      ammonium lactate (LAC-HYDRIN) 12 % lotion Apply topically 2 (two) times a day as needed for dry skin, Starting Thu 3/30/2023, Normal      amoxicillin-clavulanate (Augmentin) 875-125 mg per tablet Take 1 tablet by mouth every 12 (twelve) hours for 10 days, Starting Mon 5/22/2023, Until Thu 6/1/2023, Normal      aspirin (ECOTRIN LOW STRENGTH) 81 mg EC tablet Take 81 mg by mouth daily Takes as needed, Historical Med      !!  Cholecalciferol (VITAMIN D3) 50 MCG (2000 UT) TABS Take 2,000 Units by mouth daily, Historical Med      doxazosin (CARDURA) 2 mg tablet Take 1 tablet (2 mg total) by mouth daily, Starting Thu 5/18/2023, Normal      fluticasone (FLONASE) 50 mcg/act nasal spray into each nostril, Historical Med      hydrocortisone "2 5 % cream Apply topically 4 (four) times a day as needed, Historical Med      multivitamin (THERAGRAN) TABS Take 1 tablet by mouth in the morning , Historical Med      omeprazole (PriLOSEC) 20 mg delayed release capsule Take 1 capsule (20 mg total) by mouth daily, Starting Thu 5/18/2023, Normal      Tuberculin-Allergy Syringes (ALLERGY SYRINGE 1CC/27GX1/2\") 27G X 1/2\" 1 ML MISC Use Receives allergy injections once a month through allergist, Historical Med      vitamin B-12 (CYANOCOBALAMIN) 100 MCG TABS Take 50 mcg by mouth daily, Historical Med      !! VITAMIN D PO Take by mouth, Historical Med       !! - Potential duplicate medications found  Please discuss with provider  No discharge procedures on file      PDMP Review       Value Time User    PDMP Reviewed  Yes 2/21/2020  2:47 PM Emilio Martinez PA-C          ED Provider  Electronically Signed by           Tita Pickens DO  05/24/23 7047    "

## 2023-05-25 NOTE — DISCHARGE INSTRUCTIONS
It is important that you continue to take the antibiotic that was prescribed to you  It is important that you follow-up with your family doctor in the next 24 hours or return to the ED in the next 24 hours for symptom recheck  Turn to the ED if you develop high fevers, vomiting, chills, or any other concerning symptoms

## 2023-05-29 LAB
BACTERIA BLD CULT: NORMAL
BACTERIA BLD CULT: NORMAL

## 2023-05-30 LAB
BACTERIA BLD CULT: NORMAL
BACTERIA BLD CULT: NORMAL

## 2023-06-08 ENCOUNTER — OFFICE VISIT (OUTPATIENT)
Dept: FAMILY MEDICINE CLINIC | Facility: CLINIC | Age: 78
End: 2023-06-08
Payer: COMMERCIAL

## 2023-06-08 VITALS
HEIGHT: 64 IN | HEART RATE: 70 BPM | SYSTOLIC BLOOD PRESSURE: 124 MMHG | BODY MASS INDEX: 22.02 KG/M2 | DIASTOLIC BLOOD PRESSURE: 64 MMHG | OXYGEN SATURATION: 99 % | WEIGHT: 129 LBS | TEMPERATURE: 97.8 F

## 2023-06-08 DIAGNOSIS — L03.116 CELLULITIS OF LEFT LOWER EXTREMITY: ICD-10-CM

## 2023-06-08 DIAGNOSIS — S81.802D WOUND OF LEFT LOWER EXTREMITY, SUBSEQUENT ENCOUNTER: Primary | ICD-10-CM

## 2023-06-08 DIAGNOSIS — L29.9 PRURITUS: ICD-10-CM

## 2023-06-08 DIAGNOSIS — T14.8XXA PAIN ASSOCIATED WITH WOUND: ICD-10-CM

## 2023-06-08 DIAGNOSIS — K21.9 GASTROESOPHAGEAL REFLUX DISEASE, UNSPECIFIED WHETHER ESOPHAGITIS PRESENT: ICD-10-CM

## 2023-06-08 DIAGNOSIS — R52 PAIN ASSOCIATED WITH WOUND: ICD-10-CM

## 2023-06-08 PROCEDURE — 99214 OFFICE O/P EST MOD 30 MIN: CPT | Performed by: FAMILY MEDICINE

## 2023-06-08 RX ORDER — CLINDAMYCIN HYDROCHLORIDE 300 MG/1
300 CAPSULE ORAL 4 TIMES DAILY
Qty: 40 CAPSULE | Refills: 0 | Status: SHIPPED | OUTPATIENT
Start: 2023-06-08 | End: 2023-06-18

## 2023-06-08 RX ORDER — OMEPRAZOLE 20 MG/1
20 CAPSULE, DELAYED RELEASE ORAL DAILY
Qty: 30 CAPSULE | Refills: 0 | Status: SHIPPED | OUTPATIENT
Start: 2023-06-08

## 2023-06-08 RX ORDER — DICLOFENAC SODIUM 75 MG/1
75 TABLET, DELAYED RELEASE ORAL 2 TIMES DAILY
Qty: 20 TABLET | Refills: 0 | Status: SHIPPED | OUTPATIENT
Start: 2023-06-08

## 2023-06-08 NOTE — PROGRESS NOTES
"Name: Metro [place]      : 1945      MRN: [id number]  Encounter Provider: Darlyn Costa DO  Encounter Date: 2023   Encounter department: [address] [place] [address]     1  Wound of left lower extremity, subsequent encounter  -     Ambulatory Referral to Wound Care; Future  -     clindamycin (CLEOCIN) 300 MG capsule; Take 1 capsule (300 mg total) by mouth 4 (four) times a day for 10 days  -     diclofenac (VOLTAREN) 75 mg EC tablet; Take 1 tablet (75 mg total) by mouth 2 (two) times a day    2  Cellulitis of left lower extremity  -     Ambulatory Referral to Wound Care; Future  -     clindamycin (CLEOCIN) 300 MG capsule; Take 1 capsule (300 mg total) by mouth 4 (four) times a day for 10 days    3  Pain associated with wound  -     diclofenac (VOLTAREN) 75 mg EC tablet;  Take 1 tablet (75 mg total) by mouth 2 (two) times a day    pt already completed 10day course of Augmentin - , was given one dose IV Unasyn at ER ; wounds appear to be healing well, but still cellulitis evident on exam today - I ordered wound care eval and treat and escribed clindamycin  hold baby ASA while taking Voltaren       Subjective      Chief Complaint   Patient presents with   • Cat Bite     Cat bite left lower leg not healing well, leg is red, swollen and painful, finished course of Augmentin       Problem visit \"cat bite not healing\" per scheduling notes  Was seen here for recheck of this wound  after augmentin was started at same day sick visit , and pt was referred to ER  due to inadequate response/no improvement on oral abx  ER performed XR and septic workup, gave dose of IV unasyn plus dilaudid   Pt completed the augmentin course 1 week ago  States the pain at site of left lower leg wound travelling into ankle and foot never stopped, but yesterday was a bit worse  Redness looks the same to pt as at onset of infection about 2 weeks ago  No fever  Admits had chills " last night           5/24/2023 (2 hours)  [facility]  ED Course as of 05/24/23 2117  Wed May 24, 2023  2004 Had discussion with patient and  bedside about lab work and imaging  Confirm with them that the wound has not gotten significantly worse over the last 48 hours  Spoke with them about the options which include continuing the Augmentin at home, following up with the family doctor/ED in the next 24 hours for recheck versus admitting for IV antibiotics  The patient feels comfortable going home and continuing the Augmentin  She understands to come back to the emergency department if symptoms acutely worsen  Medical Decision Making  67-year-old female presenting for reevaluation of a cat bite to her left lower leg  Bite occurred on Friday  Was started on Augmentin on Monday  Has been on Augmentin for 48 hours  Says wound has not worsened but has not improved  Temp of 100 4 on arrival   Rest of vitals within normal limits  We will obtain septic work-up given wound infection  Will obtain x-ray of left lower leg to rule out osseous abnormality or gas formation  CBC shows no leukocytosis  CMP shows no QUYEN, normal electrolytes [place] within normal limits  Lactic within normal limits  X-ray of the left lower leg shows no acute osseous abnormality or signs of gas formation  Given normal lab work, non worsening of symptoms, discussed with patient the options of going home with continued Augmentin use and PCP follow-up in the next day versus admission for IV antibiotics  Patient is comfortable going home at this time  Told to follow-up with her family doctor or return to the ED in the next 24 hours for symptom recheck    Told to return to the ED if symptoms acutely worsen          Review of Systems    Current Outpatient Medications on File Prior to Visit   Medication Sig   • Acetaminophen (TYLENOL ARTHRITIS PAIN PO) Take by mouth   • ammonium lactate (LAC-HYDRIN) "12 % lotion Apply topically 2 (two) times a day as needed for dry skin   • aspirin (ECOTRIN LOW STRENGTH) 81 mg EC tablet Take 81 mg by mouth daily Takes as needed   • Cholecalciferol (VITAMIN D3) 50 MCG (2000 UT) TABS Take 2,000 Units by mouth daily   • doxazosin (CARDURA) 2 mg tablet Take 1 tablet (2 mg total) by mouth daily   • fluticasone (FLONASE) 50 mcg/act nasal spray into each nostril   • hydrocortisone 2 5 % cream Apply topically 4 (four) times a day as needed   • multivitamin (THERAGRAN) TABS Take 1 tablet by mouth in the morning  • omeprazole (PriLOSEC) 20 mg delayed release capsule Take 1 capsule (20 mg total) by mouth daily   • Tuberculin-Allergy Syringes (ALLERGY SYRINGE 1CC/27GX1/2\") 27G X 1/2\" 1 ML MISC Use Receives allergy injections once a month through allergist   • vitamin B-12 (CYANOCOBALAMIN) 100 MCG TABS Take 50 mcg by mouth daily   • VITAMIN D PO Take by mouth   • acetaminophen (TYLENOL) 325 mg tablet Take 650 mg by mouth every 6 (six) hours as needed for mild pain (Patient not taking: Reported on 5/18/2023)       Objective     /64 (BP Location: Left arm, Patient Position: Sitting, Cuff Size: Standard)   Pulse 70   Temp 97 8 °F (36 6 °C) (Tympanic)   Ht 5' 4\" (1 626 m)   Wt 58 5 kg (129 lb)   SpO2 99%   BMI 22 14 kg/m²     Physical Exam  Vitals and nursing note reviewed  Constitutional:       General: She is not in acute distress  Appearance: She is well-developed  She is not ill-appearing, toxic-appearing or diaphoretic  HENT:      Head: Normocephalic and atraumatic  Mouth/Throat:      Pharynx: Uvula midline  Neck:      Trachea: Phonation normal    Pulmonary:      Effort: Pulmonary effort is normal    Skin:     General: Skin is warm and dry  Coloration: Skin is not pale  Neurological:      Mental Status: She is alert and oriented to person, place, and time  Psychiatric:         Mood and Affect: Mood is anxious (mildly)           Behavior: Behavior " normal  Behavior is cooperative         Isi Lima, DO

## 2023-06-20 ENCOUNTER — TELEPHONE (OUTPATIENT)
Dept: FAMILY MEDICINE CLINIC | Facility: CLINIC | Age: 78
End: 2023-06-20

## 2023-06-20 NOTE — TELEPHONE ENCOUNTER
Pt inquired as to whether she could get more antibiotic for her cat bite wound  Pt said her wound is healing but not better  It still hurts and is oozing  Spoke to clinical and it was recommended that she wait for her wound care appointment on the 27th

## 2023-06-26 ENCOUNTER — OFFICE VISIT (OUTPATIENT)
Dept: WOUND CARE | Facility: CLINIC | Age: 78
End: 2023-06-26
Payer: COMMERCIAL

## 2023-06-26 VITALS
HEART RATE: 50 BPM | TEMPERATURE: 98 F | RESPIRATION RATE: 18 BRPM | HEIGHT: 64 IN | SYSTOLIC BLOOD PRESSURE: 162 MMHG | BODY MASS INDEX: 21.68 KG/M2 | WEIGHT: 127 LBS | DIASTOLIC BLOOD PRESSURE: 61 MMHG
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
## 2023-06-26 DIAGNOSIS — S81.852A CAT BITE OF LEFT LOWER LEG, INITIAL ENCOUNTER: ICD-10-CM

## 2023-06-26 DIAGNOSIS — S81.802A OPEN WOUND OF LEFT LOWER LEG, INITIAL ENCOUNTER: Primary | ICD-10-CM

## 2023-06-26 PROCEDURE — 11042 DBRDMT SUBQ TIS 1ST 20SQCM/<: CPT | Performed by: STUDENT IN AN ORGANIZED HEALTH CARE EDUCATION/TRAINING PROGRAM

## 2023-06-26 PROCEDURE — 99213 OFFICE O/P EST LOW 20 MIN: CPT | Performed by: STUDENT IN AN ORGANIZED HEALTH CARE EDUCATION/TRAINING PROGRAM

## 2023-06-26 PROCEDURE — G0463 HOSPITAL OUTPT CLINIC VISIT: HCPCS | Performed by: STUDENT IN AN ORGANIZED HEALTH CARE EDUCATION/TRAINING PROGRAM

## 2023-06-26 PROCEDURE — 87070 CULTURE OTHR SPECIMN AEROBIC: CPT | Performed by: STUDENT IN AN ORGANIZED HEALTH CARE EDUCATION/TRAINING PROGRAM

## 2023-06-26 PROCEDURE — 99204 OFFICE O/P NEW MOD 45 MIN: CPT | Performed by: STUDENT IN AN ORGANIZED HEALTH CARE EDUCATION/TRAINING PROGRAM

## 2023-06-26 PROCEDURE — 11045 DBRDMT SUBQ TISS EACH ADDL: CPT | Performed by: STUDENT IN AN ORGANIZED HEALTH CARE EDUCATION/TRAINING PROGRAM

## 2023-06-26 PROCEDURE — 87077 CULTURE AEROBIC IDENTIFY: CPT | Performed by: STUDENT IN AN ORGANIZED HEALTH CARE EDUCATION/TRAINING PROGRAM

## 2023-06-26 PROCEDURE — NC001 PR NO CHARGE: Performed by: STUDENT IN AN ORGANIZED HEALTH CARE EDUCATION/TRAINING PROGRAM

## 2023-06-26 PROCEDURE — 87205 SMEAR GRAM STAIN: CPT | Performed by: STUDENT IN AN ORGANIZED HEALTH CARE EDUCATION/TRAINING PROGRAM

## 2023-06-26 RX ORDER — LIDOCAINE 40 MG/G
CREAM TOPICAL ONCE
Status: COMPLETED | OUTPATIENT
Start: 2023-06-26 | End: 2023-06-26

## 2023-06-26 RX ADMIN — LIDOCAINE 1 APPLICATION: 40 CREAM TOPICAL at 14:10

## 2023-06-26 NOTE — PROGRESS NOTES
Patient ID: Oly Garcia is a 65 y o  female Date of Birth 1945       Chief Complaint   Patient presents with   • New Patient Visit     Patient here today for evaluation and management of a cat bite to lower left leg on 5/19/2023; cat belongs to pt and is up to date on vaccines; Patient was evaluated by PCP on 5/22/2023, placed on antibiotic  Patient returned on 5/24/2023 for evaluation  PCP sent pt to ED for further evaluation  Allergies:  Cat hair extract, Dust mite extract, Iv dye  [iodinated contrast media], Other, and Pollen extract    Diagnosis:   Diagnosis ICD-10-CM Associated Orders   1  Open wound of left lower leg, initial encounter  S81 802A VAS lower limb arterial duplex, limited/unilateral     collagenase (SANTYL) ointment      2  Cat bite of left lower leg, initial encounter  S81 852A lidocaine (LMX) 4 % cream    W55  01XA Wound cleansing and dressings     Wound culture and Gram stain     Wound culture and Gram stain           Assessment  & Plan:    • Initial evaluation of open wound of LLE  Wound is circular and punched out overlying the anterior tibia  There is a significant amount of string-like fibrinous material within the wound bed  Wound has depth of 0 7 cm but does not probe to bone  No tunnel identified  There is persistent mild erythema surrounding the wound without lymphangitic streaking  Wound is not malodorous  Drainage is small    o Surgical debridement, as below  o Medihoney to wound in office and to be used until [place] is obtained  Once obtained, begin using Santyl and keeping the area   o Discontinue soaking the wound in water and using hydrogen peroxide  Given depth of wound would advise not showering at this time  Avoid soaking the area in any standing body of water    o Given persistent surrounding erythema wound culture was obtained  Pt is s/p course of Augmentin and Clindamycin  Will place pt on oral abx prn based on culture results    o Although 2+ DP and PT pulses on examination, arterial testing ordered to assess for insufficiency given punched out nature of wound and location along anterior tibia  o Prior XR without evidence of osseous lesion  If no significant progress made by next visit will obtain MRI given persistent surrounding redness and depth of wound  o Obtain 3-4 servings of protein daily for wound healing  If unable to achieve this with diet alone, supplement with protein shakes  o Instructed to monitor for any changes including redness or swelling surrounding the wound, increased drainage or pain as well as fevers or chills     o F/u in one week  Instructed to call if any questions or concerns arise in meantime  XR L tibia fibula 05/24/23:   FINDINGS:     There is no acute fracture or dislocation      No significant degenerative changes      No lytic or blastic osseous lesion      Mild soft tissue swelling along the lower leg      IMPRESSION:     No acute osseous abnormality           Subjective:   06/26/23: 66 y/o F with PMHx of RA, HTN, hx of NSTEMI, hx of femur fracture d/t fall, early satiety presents for evaluation of persistent wound on her LLE  Pt notes that this wound started as a cat bite but has persisted now for over one month  She initially presented to her PCP on 05/22/23 regarding this wound and was started on Augmentin and XR was ordered given surrounding cellulitis  PCP checked wound two days later and there was no improvement of erythema or pain and therefore pt was referred to the ED  Was given IV dose of Unaysn  Workup in ED did not demonstrate leukocytosis, QUYEN, electrolyte abnormalities and XR was without osseous abnormality or gas formation therefore pt was d/c and instructed to continue Augmentin  She followed up with PCP on 06/08/23 and given the persistent redness Clindamycin was prescribed and pt was referred to wound care   On evaluation today, pt notes that she has been soaking the wound and letting water run over it multiple "times each day \"to keep it moist\"  She has been using hydrogen peroxide to the area along with Neosporin  She has also been squeezing at the area and attempting to remove the yellow tissue in the wound with a toothpick  Has a decreased appetite  States she has chills occasionally and fever in the past, but denies them today              The following portions of the patient's history were reviewed and updated as appropriate:   Patient Active Problem List   Diagnosis   • Abnormal EKG   • Allergic rhinitis   • Benign essential hypertension   • Cataract   • Intermittent palpitations   • Lung density on x-ray   • Visual field scotoma of both eyes   • S/P trigger finger release   • Rheumatoid arthritis involving both hands with positive rheumatoid factor (HCC)   • GERD (gastroesophageal reflux disease)   • Age related osteoporosis   • History of non-ST elevation myocardial infarction (NSTEMI)   • History of fracture of femur   • History of fracture due to fall   • Left hip pain   • Side effect of medication   • Abdominal bloating   • Early satiety   • Hand dermatitis   • Unspecified multiple injuries, sequela   • Anemia   • Hip arthritis     Past Medical History:   Diagnosis Date   • Cellulitis of right hand 5/10/2022   • Closed fracture of right proximal humerus 2/15/2020   • NSTEMI (non-ST elevated myocardial infarction) ([facility] [address] ) 2/16/2020   • Osteopenia 10/23/2017     Past Surgical History:   Procedure Laterality Date   • APPENDECTOMY     • CATARACT EXTRACTION     • LAPAROSCOPIC APPENDECTOMY      incidental    • FL TENDON SHEATH INCISION Left 2/26/2019    Procedure: LONG FINGER TRIGGER FINGER RELEASE;  Surgeon: Radha Verdugo MD;  Location: [facility];  Service: Orthopedics   • TONSILLECTOMY     • TUBAL LIGATION       Family History   Problem Relation Age of Onset   • Lung cancer Mother    • Hypertension Mother    • Cancer Mother    • Cerebral aneurysm Father         bleed   • Alcohol abuse Father    • Cirrhosis " Father    • Other Father         had fallen   • Other Sister         unkownn cause    • Pneumonia Brother         infancy of pneumonia   • Other Sister         in MVA     Social History     Socioeconomic History   • Marital status: /Civil Union     Spouse name: Not on file   • Number of children: 3   • Years of education: Not on file   • Highest education level: Not on file   Occupational History   • Not on file   Tobacco Use   • Smoking status: Former     Packs/day: 1 00     Types: Cigarettes   • Smokeless tobacco: Never   • Tobacco comments:     age [age]    Vaping Use   • Vaping Use: Never used   Substance and Sexual Activity   • Alcohol use: Yes     Alcohol/week: 10 0 standard drinks of alcohol     Types: 10 Glasses of wine per week   • Drug use: No   • Sexual activity: Not Currently   Other Topics Concern   • Not on file   Social History Narrative    Always uses seat belt     Caffeine use 2 cans/cups per day     Exercises regularly     Housewife or homemaker      Social Determinants of Health     Financial Resource Strain: Not on file   Food Insecurity: No Food Insecurity (5/12/2022)    Hunger Vital Sign    • Worried About Running Out of Food in the Last Year: Never true    • Ran Out of Food in the Last Year: Never true   Transportation Needs: No Transportation Needs (5/12/2022)    PRAPARE - Transportation    • Lack of Transportation (Medical): No    • Lack of Transportation (Non-Medical):  No   Physical Activity: Inactive (8/27/2020)    Exercise Vital Sign    • Days of Exercise per Week: 0 days    • Minutes of Exercise per Session: 0 min   Stress: Not on file   Social Connections: Not on file   Intimate Partner Violence: Not on file   Housing Stability: Low Risk  (5/12/2022)    Housing Stability Vital Sign    • Unable to Pay for Housing in the Last Year: No    • Number of Places Lived in the Last Year: 1    • Unstable Housing in the Last Year: No       Current Outpatient Medications:   •  collagenase "(SANTYL) ointment, Apply topically daily To wound of L anterior shin , Disp: 30 g, Rfl: 1  •  Acetaminophen (TYLENOL ARTHRITIS PAIN PO), Take by mouth, Disp: , Rfl:   •  acetaminophen (TYLENOL) 325 mg tablet, Take 650 mg by mouth every 6 (six) hours as needed for mild pain (Patient not taking: Reported on 5/18/2023), Disp: , Rfl:   •  ammonium lactate (LAC-HYDRIN) 12 % lotion, Apply topically 2 (two) times a day as needed for dry skin, Disp: 225 g, Rfl: 1  •  aspirin (ECOTRIN LOW STRENGTH) 81 mg EC tablet, Take 81 mg by mouth daily Takes as needed, Disp: , Rfl:   •  Cholecalciferol (VITAMIN D3) 50 MCG (2000 UT) TABS, Take 2,000 Units by mouth daily, Disp: , Rfl:   •  diclofenac (VOLTAREN) 75 mg EC tablet, Take 1 tablet (75 mg total) by mouth 2 (two) times a day, Disp: 20 tablet, Rfl: 0  •  doxazosin (CARDURA) 2 mg tablet, Take 1 tablet (2 mg total) by mouth daily, Disp: 90 tablet, Rfl: 0  •  fluticasone (FLONASE) 50 mcg/act nasal spray, into each nostril, Disp: , Rfl:   •  hydrocortisone 2 5 % cream, Apply topically 2 (two) times a day as needed for irritation, Disp: 30 g, Rfl: 0  •  multivitamin (THERAGRAN) TABS, Take 1 tablet by mouth in the morning , Disp: , Rfl:   •  omeprazole (PriLOSEC) 20 mg delayed release capsule, Take 1 capsule (20 mg total) by mouth daily, Disp: 30 capsule, Rfl: 0  •  Tuberculin-Allergy Syringes (ALLERGY SYRINGE 1CC/27GX1/2\") 27G X 1/2\" 1 ML MISC, Use Receives allergy injections once a month through allergist, Disp: , Rfl:   •  vitamin B-12 (CYANOCOBALAMIN) 100 MCG TABS, Take 50 mcg by mouth daily, Disp: , Rfl:   •  VITAMIN D PO, Take by mouth, Disp: , Rfl:   No current facility-administered medications for this visit  Review of Systems   Constitutional: Positive for appetite change (diminished) and chills  Negative for fever  Gastrointestinal: Positive for abdominal distention  + stool incontinence     Musculoskeletal: Positive for arthralgias     Skin: Positive for color " "change (mild redness persisting around wound) and wound (LLE)  Psychiatric/Behavioral: The patient is nervous/anxious  Objective:  /61   Pulse (!) 50   Temp 98 °F (36 7 °C)   Resp 18   Ht 5' 4\" (1 626 m)   Wt 57 6 kg (127 lb)   BMI 21 80 kg/m²   Pain Score:   5     Physical Exam  Vitals reviewed  Constitutional:       General: She is not in acute distress  Appearance: She is normal weight  Comments: Appears frail and anxious     Cardiovascular:      Rate and Rhythm: Bradycardia present  Pulses:           Dorsalis pedis pulses are 2+ on the left side  Posterior tibial pulses are 2+ on the left side  Pulmonary:      Effort: Pulmonary effort is normal  No respiratory distress  Musculoskeletal:      Right lower leg: No edema  Left lower leg: No edema  Skin:     General: Skin is warm  Findings: Wound present  Comments: Wound is circular and punched out overlying the anterior tibia  There is a significant amount of string-like fibrinous material within the wound bed  Wound has depth of 0 7 cm but does not probe to bone  No tunnel identified  There is persistent mild erythema surrounding the wound without lymphangitic streaking  Wound is not malodorous  Drainage is small  There is \"V\"shaped scar on anteromedial LLE  Linear scarring of RLE is present  Neurological:      Mental Status: She is alert  Wound 06/26/23 Other (Comment) Leg Left; Lower (Active)   Wound Image       06/26/23 1446   Wound Description Yellow 06/26/23 1410   Johana-wound Assessment Edema; Erythema 06/26/23 1410   Wound Length (cm) 0 8 cm 06/26/23 1410   Wound Width (cm) 0 6 cm 06/26/23 1410   Wound Depth (cm) 0 7 cm 06/26/23 1410   Wound Surface Area (cm^2) 0 48 cm^2 06/26/23 1410   Wound Volume (cm^3) 0 336 cm^3 06/26/23 1410   Calculated Wound Volume (cm^3) 0 34 cm^3 06/26/23 1410   Drainage Amount Small 06/26/23 1410   Drainage Description Serous 06/26/23 " "1410   Non-staged Wound Description Full thickness 06/26/23 1410   Dressing Status Other (Comment) 06/26/23 1410                Debridement   Wound 06/26/23 Other (Comment) Leg Left; Lower    Universal Protocol:  Consent: Verbal consent obtained  Consent given by: patient  Time out: Immediately prior to procedure a \"time out\" was called to verify the correct patient, procedure, equipment, support staff and site/side marked as required  Patient understanding: patient states understanding of the procedure being performed  Patient identity confirmed: verbally with patient      Performed by: PA  Debridement type: surgical  Level of debridement: subcutaneous tissue  Pain control: lidocaine 4%  Post-debridement measurements  Length (cm): 0 8  Width (cm): 0 6  Depth (cm): 0 7  Percent debrided: 60%  Surface Area (cm^2): 0 48  Area debrided (cm^2): 0 29  Volume (cm^3): 0 34  Tissue and other material debrided: dermis and subcutaneous tissue  Devitalized tissue debrided: fibrin  Instrument(s) utilized: blade and forceps  Bleeding: small  Hemostasis obtained with: pressure  Procedural pain (0-10): 0  Post-procedural pain: 0   Response to treatment: procedure was tolerated well                         Wound Instructions:  Orders Placed This Encounter   Procedures   • Wound cleansing and dressings     Left Lower Leg wound:  Wash your hands with soap and water  Cleanse the wound with NSS or wound cleanser prior to applying a clean dressing  Pat dry using gauze  Shower yes --may shower but will need to cover the wound with a cast cover to keep the dressings dry    Apply Santyl-nickel thick to the wound    Cover with gauze, ida and tape  Change dressing daily and as needed     Today at the [facility] Medihoney to the wound and covered with gauze, ida and tape----  Continue to use the Medihoney to the wound until you receive the Santyl then may begin applying this ointment    If the Santyl is to expensive then may " continue to use the Medihoney to the wound each day      Elastic Tubular Stocking---Spandagrip Size E to the LLE    Tubular elastic bandage: Apply from base of toes to behind the knee  Apply in AM, may remove for sleep  Avoid prolonged standing in one place  Elevate leg(s) above the level of the heart when sitting or as much as possible  Wound Culture was completed today to the Left LE wound      Recommend increasing the amount of Protein in your diet to assist with wound healing     Standing Status:   Future     Standing Expiration Date:   6/26/2024   • Wound culture and Gram stain     Standing Status:   Future     Number of Occurrences:   1     Standing Expiration Date:   6/26/2024     Order Specific Question:   Release to patient through [employer]     Answer:   Immediate       Albina Sargent PA-C      Total time spent today:  40 minutes  This includes reviewing the patient's chart, pertinent physician records including family medicine note from 05/22/23, 05/24/23, 06/08/23, ED ntoes from 05/24/23, CBC and CMP from 05/24/23, blood culture resutls from 05/24/23, XR L tib fib from 05/24/23

## 2023-06-26 NOTE — PATIENT INSTRUCTIONS
Orders Placed This Encounter   Procedures    Wound cleansing and dressings     Left Lower Leg wound:  Wash your hands with soap and water  Cleanse the wound with NSS or wound cleanser prior to applying a clean dressing  Pat dry using gauze  Shower yes --may shower but will need to cover the wound with a cast cover to keep the dressings dry    Apply Santyl-nickel thick to the wound  Cover with gauze, ida and tape  Change dressing daily and as needed     Today at the [facility] Medihoney to the wound and covered with gauze, ida and tape----  Continue to use the Medihoney to the wound until you receive the Santyl then may begin applying this ointment    If the Santyl is to expensive then may continue to use the Medihoney to the wound each day      Elastic Tubular Stocking---Spandagrip Size E to the LLE    Tubular elastic bandage: Apply from base of toes to behind the knee  Apply in AM, may remove for sleep  Avoid prolonged standing in one place  Elevate leg(s) above the level of the heart when sitting or as much as possible         Wound Culture was completed today to the Left LE wound      Recommend increasing the amount of Protein in your diet to assist with wound healing     Standing Status:   Future     Standing Expiration Date:   6/26/2024

## 2023-06-28 ENCOUNTER — TELEPHONE (OUTPATIENT)
Dept: WOUND CARE | Facility: CLINIC | Age: 78
End: 2023-06-28
Payer: COMMERCIAL

## 2023-06-28 DIAGNOSIS — S81.802A OPEN WOUND OF LEFT LOWER LEG, INITIAL ENCOUNTER: Primary | ICD-10-CM

## 2023-06-28 DIAGNOSIS — R53.83 FATIGUE, UNSPECIFIED TYPE: ICD-10-CM

## 2023-06-28 DIAGNOSIS — M79.605 LEFT LEG PAIN: ICD-10-CM

## 2023-06-28 PROCEDURE — 99204 OFFICE O/P NEW MOD 45 MIN: CPT | Performed by: STUDENT IN AN ORGANIZED HEALTH CARE EDUCATION/TRAINING PROGRAM

## 2023-06-28 NOTE — TELEPHONE ENCOUNTER
Called pt to relay that culture did not result in any bacterial growth and therefore further abx not indicated at this time  Pt states that her L leg has pain and she is very fatigued  Will order CBC to assess for leukocytosis and severe anemia given her hx of anemia and XR of L leg to r/o osteomyelitis/gas in tissues  If she continues to not feel well would recommend evaluation in ED  Discussed importance of presenting to ED if she continues to feel malaise and extreme fatigue

## 2023-06-29 ENCOUNTER — APPOINTMENT (OUTPATIENT)
Dept: LAB | Facility: CLINIC | Age: 78
End: 2023-06-29
Payer: COMMERCIAL

## 2023-06-29 ENCOUNTER — APPOINTMENT (OUTPATIENT)
Dept: RADIOLOGY | Facility: CLINIC | Age: 78
End: 2023-06-29
Payer: COMMERCIAL

## 2023-06-29 DIAGNOSIS — R53.83 FATIGUE, UNSPECIFIED TYPE: ICD-10-CM

## 2023-06-29 DIAGNOSIS — E87.1 HYPONATREMIA: ICD-10-CM

## 2023-06-29 DIAGNOSIS — S81.802A OPEN WOUND OF LEFT LOWER LEG, INITIAL ENCOUNTER: ICD-10-CM

## 2023-06-29 DIAGNOSIS — M79.605 LEFT LEG PAIN: ICD-10-CM

## 2023-06-29 DIAGNOSIS — D64.9 ANEMIA, UNSPECIFIED TYPE: ICD-10-CM

## 2023-06-29 LAB
ANION GAP SERPL CALCULATED.3IONS-SCNC: 7 MMOL/L
BUN SERPL-MCNC: 28 MG/DL (ref 5–25)
CALCIUM SERPL-MCNC: 9.2 MG/DL (ref 8.3–10.1)
CHLORIDE SERPL-SCNC: 108 MMOL/L (ref 96–108)
CO2 SERPL-SCNC: 27 MMOL/L (ref 21–32)
CREAT SERPL-MCNC: 1.45 MG/DL (ref 0.6–1.3)
GFR SERPL CREATININE-BSD FRML MDRD: 34 ML/MIN/1.73SQ M
GLUCOSE SERPL-MCNC: 127 MG/DL (ref 65–140)
POTASSIUM SERPL-SCNC: 4.7 MMOL/L (ref 3.5–5.3)
SODIUM SERPL-SCNC: 142 MMOL/L (ref 135–147)

## 2023-06-29 PROCEDURE — 36415 COLL VENOUS BLD VENIPUNCTURE: CPT

## 2023-06-29 PROCEDURE — 73590 X-RAY EXAM OF LOWER LEG: CPT

## 2023-06-29 PROCEDURE — 80048 BASIC METABOLIC PNL TOTAL CA: CPT

## 2023-06-29 PROCEDURE — 85025 COMPLETE CBC W/AUTO DIFF WBC: CPT

## 2023-06-30 LAB
BACTERIA WND AEROBE CULT: ABNORMAL
BASOPHILS # BLD AUTO: 0.05 THOUSANDS/ÂΜL (ref 0–0.1)
BASOPHILS NFR BLD AUTO: 1 % (ref 0–1)
EOSINOPHIL # BLD AUTO: 0.13 THOUSAND/ÂΜL (ref 0–0.61)
EOSINOPHIL NFR BLD AUTO: 2 % (ref 0–6)
ERYTHROCYTE [DISTWIDTH] IN BLOOD BY AUTOMATED COUNT: 12.2 % (ref 11.6–15.1)
GRAM STN SPEC: ABNORMAL
HCT VFR BLD AUTO: 31.5 % (ref 34.8–46.1)
HGB BLD-MCNC: 10.1 G/DL (ref 11.5–15.4)
IMM GRANULOCYTES # BLD AUTO: 0.04 THOUSAND/UL (ref 0–0.2)
IMM GRANULOCYTES NFR BLD AUTO: 1 % (ref 0–2)
LYMPHOCYTES # BLD AUTO: 1.29 THOUSANDS/ÂΜL (ref 0.6–4.47)
LYMPHOCYTES NFR BLD AUTO: 19 % (ref 14–44)
MCH RBC QN AUTO: 31.4 PG (ref 26.8–34.3)
MCHC RBC AUTO-ENTMCNC: 32.1 G/DL (ref 31.4–37.4)
MCV RBC AUTO: 98 FL (ref 82–98)
MONOCYTES # BLD AUTO: 0.5 THOUSAND/ÂΜL (ref 0.17–1.22)
MONOCYTES NFR BLD AUTO: 7 % (ref 4–12)
NEUTROPHILS # BLD AUTO: 4.76 THOUSANDS/ÂΜL (ref 1.85–7.62)
NEUTS SEG NFR BLD AUTO: 70 % (ref 43–75)
NRBC BLD AUTO-RTO: 0 /100 WBCS
PLATELET # BLD AUTO: 329 THOUSANDS/UL (ref 149–390)
PMV BLD AUTO: 11.2 FL (ref 8.9–12.7)
RBC # BLD AUTO: 3.22 MILLION/UL (ref 3.81–5.12)
WBC # BLD AUTO: 6.77 THOUSAND/UL (ref 4.31–10.16)

## 2023-07-01 ENCOUNTER — TELEPHONE (OUTPATIENT)
Dept: WOUND CARE | Facility: CLINIC | Age: 78
End: 2023-07-01

## 2023-07-01 DIAGNOSIS — S81.802A OPEN WOUND OF LEFT LOWER LEG, INITIAL ENCOUNTER: Primary | ICD-10-CM

## 2023-07-01 RX ORDER — DOXYCYCLINE HYCLATE 100 MG/1
100 CAPSULE ORAL EVERY 12 HOURS SCHEDULED
Qty: 14 CAPSULE | Refills: 0 | Status: SHIPPED | OUTPATIENT
Start: 2023-07-01 | End: 2023-07-08

## 2023-07-01 NOTE — TELEPHONE ENCOUNTER
Called patient to review results of XR and CBC  XR without osseous lesions or gas of soft tissues  CBC without leukocytosis  Final culture result did reveal 2 colonies of Pasturella multocida which is consistent with hx of cat bite  Given pt continues to have pain of the leg and mild persistent erythema will trial course of Doxycycline given Pasturella is typically susceptible to this  Pt is aware of addition of medication  Discussed with patient that medication should be spaced out from taking multivitamin, sunscreen should be applied when going out into sun given photosensitivity and medication should be d/c if any heart palpitations or racing heart rate occurs  Has next f/u on 07/05/23  If significant increase in pain or alarming sx prior to next appointment report to ED for further evaluation

## 2023-07-04 DIAGNOSIS — K21.9 GASTROESOPHAGEAL REFLUX DISEASE, UNSPECIFIED WHETHER ESOPHAGITIS PRESENT: ICD-10-CM

## 2023-07-05 ENCOUNTER — OFFICE VISIT (OUTPATIENT)
Dept: WOUND CARE | Facility: CLINIC | Age: 78
End: 2023-07-05
Payer: COMMERCIAL

## 2023-07-05 VITALS
TEMPERATURE: 97.8 F | DIASTOLIC BLOOD PRESSURE: 72 MMHG | RESPIRATION RATE: 18 BRPM | SYSTOLIC BLOOD PRESSURE: 106 MMHG | HEART RATE: 67 BPM

## 2023-07-05 DIAGNOSIS — S81.852A CAT BITE OF LEFT LOWER LEG, INITIAL ENCOUNTER: ICD-10-CM
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
## 2023-07-05 DIAGNOSIS — M79.605 LEFT LEG PAIN: ICD-10-CM

## 2023-07-05 DIAGNOSIS — S81.802A OPEN WOUND OF LEFT LOWER LEG, INITIAL ENCOUNTER: Primary | ICD-10-CM

## 2023-07-05 PROCEDURE — 99214 OFFICE O/P EST MOD 30 MIN: CPT | Performed by: STUDENT IN AN ORGANIZED HEALTH CARE EDUCATION/TRAINING PROGRAM

## 2023-07-05 PROCEDURE — 97597 DBRDMT OPN WND 1ST 20 CM/<: CPT | Performed by: STUDENT IN AN ORGANIZED HEALTH CARE EDUCATION/TRAINING PROGRAM

## 2023-07-05 RX ORDER — OMEPRAZOLE 20 MG/1
CAPSULE, DELAYED RELEASE ORAL
Qty: 30 CAPSULE | Refills: 0 | Status: SHIPPED | OUTPATIENT
Start: 2023-07-05

## 2023-07-05 RX ORDER — LIDOCAINE 40 MG/G
CREAM TOPICAL ONCE
Status: COMPLETED | OUTPATIENT
Start: 2023-07-05 | End: 2023-07-05

## 2023-07-05 RX ADMIN — LIDOCAINE: 40 CREAM TOPICAL at 14:33

## 2023-07-05 NOTE — PATIENT INSTRUCTIONS
Orders Placed This Encounter   Procedures    Wound cleansing and dressings     Left Lower Leg wound:   Wash your hands with soap and water. Cleanse the wound with NSS or wound cleanser prior to applying a clean dressing. Pat dry using gauze. Shower yes --may shower but will need to cover the wound with a cast cover to keep the dressings dry     Stop Santyl  Apply Mupircirin ointment to wound. Cover with bordered gauze. Change dressing daily and as needed     Today at the [facility] as ordered above. Elastic Tubular Stocking---Spandagrip Size E to the LLE   Tubular elastic bandage: Apply from base of toes to behind the knee. Apply in AM, may remove for sleep. Avoid prolonged standing in one place. Elevate leg(s) above the level of the heart when sitting or as much as possible.        Recommend increasing the amount of Protein in your diet to assist with wound healing     Standing Status:   Future     Standing Expiration Date:   7/5/2024

## 2023-07-05 NOTE — PROGRESS NOTES
Patient ID: Sneha Galan is a 65 y.o. female Date of Birth 1945       Chief Complaint   Patient presents with   • Follow Up Wound Care Visit     Follow up visit for wound to left leg       Allergies:  Cat hair extract, Dust mite extract, Iv dye  [iodinated contrast media], Other, and Pollen extract    Diagnosis:   Diagnosis ICD-10-CM Associated Orders   1. Open wound of left lower leg, initial encounter  S81.802A lidocaine (LMX) 4 % cream     Wound cleansing and dressings     mupirocin (BACTROBAN) 2 % ointment     Debridement      2. Cat bite of left lower leg, initial encounter  B88.093G     W55. 01XA       3. Left leg pain  M79.605            Assessment  & Plan:    • F/u open wound of LLE related to cat bite. Wound is decreased in size. There is only small opening remaining without significant depth or probe to bone. Wound bed with fibrin and granulation tissue. There is surrounding soft tissue swelling with faint erythema. No lymphangitic streaking or discrete area of fluctuance to suggest presence of abscess. o Selective debridement, as below. o Mupirocin to the wound bed. Change daily. Keep area covered. o Avoid soaking wound in any body of water. o Complete course of Doxycycline. o Obtain 3-4 servings of protein daily for wound healing. o XR without acute osseous abnormality, see results below. o Do not feel as though vascular testing is necessary at this time given the improvement seen in healing since prior visit.   o Spandagrip for edema control.   o Monitor for increased pain, redness, red streaking, foul smell, fevers, chills.   o F/u in one week. Instructed to call if any questions or concerns arise in meantime.      XR tib fib left 06/29/23:      FINDINGS:     There is no acute fracture or dislocation.     No significant degenerative changes.     No lytic or blastic osseous lesion.     Soft tissues are unremarkable.     Postoperative change of the distal femur noted.     IMPRESSION:     No acute osseous abnormality. Subjective:   06/26/23: 64 y/o F with PMHx of RA, HTN, hx of NSTEMI, hx of femur fracture d/t fall, early satiety presents for evaluation of persistent wound on her LLE. Pt notes that this wound started as a cat bite but has persisted now for over one month. She initially presented to her PCP on 05/22/23 regarding this wound and was started on Augmentin and XR was ordered given surrounding cellulitis. PCP checked wound two days later and there was no improvement of erythema or pain and therefore pt was referred to the ED. Was given IV dose of Unaysn. Workup in ED did not demonstrate leukocytosis, QUYEN, electrolyte abnormalities and XR was without osseous abnormality or gas formation therefore pt was d/c and instructed to continue Augmentin. She followed up with PCP on 06/08/23 and given the persistent redness Clindamycin was prescribed and pt was referred to wound care. On evaluation today, pt notes that she has been soaking the wound and letting water run over it multiple times each day "to keep it moist". She has been using hydrogen peroxide to the area along with Neosporin. She has also been squeezing at the area and attempting to remove the yellow tissue in the wound with a toothpick. Has a decreased appetite. States she has chills occasionally and fever in the past, but denies them today. 07/05/23: Pt presents for f/u of wound related to cat bite. Since previous visit, pt has obtained Santyl and has been using this to the wound daily. Culture results demonstrated growth of Pasturella and pt was initiated on Doxycycline and has been taking this abx as prescribed. CBC was without leukocytosis and there were no acute osseous abnormalities or soft tissue findings on XR of leg. Pt has continued to have pain but denies fevers, chills.            The following portions of the patient's history were reviewed and updated as appropriate:   Patient Active Problem List   Diagnosis   • Abnormal EKG   • Allergic rhinitis   • Benign essential hypertension   • Cataract   • Intermittent palpitations   • Lung density on x-ray   • Visual field scotoma of both eyes   • S/P trigger finger release   • Rheumatoid arthritis involving both hands with positive rheumatoid factor (HCC)   • GERD (gastroesophageal reflux disease)   • Age related osteoporosis   • History of non-ST elevation myocardial infarction (NSTEMI)   • History of fracture of femur   • History of fracture due to fall   • Left hip pain   • Side effect of medication   • Abdominal bloating   • Early satiety   • Hand dermatitis   • Unspecified multiple injuries, sequela   • Anemia   • Hip arthritis     Past Medical History:   Diagnosis Date   • Cellulitis of right hand 5/10/2022   • Closed fracture of right proximal humerus 2/15/2020   • NSTEMI (non-ST elevated myocardial infarction) ([address]) 2/16/2020   • Osteopenia 10/23/2017     Past Surgical History:   Procedure Laterality Date   • APPENDECTOMY     • CATARACT EXTRACTION     • LAPAROSCOPIC APPENDECTOMY      incidental    • HI TENDON SHEATH INCISION Left 2/26/2019    Procedure: LONG FINGER TRIGGER FINGER RELEASE;  Surgeon: Jennifer Jeter MD;  Location: [facility];  Service: Orthopedics   • TONSILLECTOMY     • TUBAL LIGATION       Family History   Problem Relation Age of Onset   • Lung cancer Mother    • Hypertension Mother    • Cancer Mother    • Cerebral aneurysm Father         bleed   • Alcohol abuse Father    • Cirrhosis Father    • Other Father         had fallen   • Other Sister         unkownn cause    • Pneumonia Brother         infancy of pneumonia   • Other Sister         in MVA     Social History     Socioeconomic History   • Marital status: /Civil Union     Spouse name: Not on file   • Number of children: 3   • Years of education: Not on file   • Highest education level: Not on file   Occupational History   • Not on file   Tobacco Use   • Smoking status: Former     Packs/day: 1.00     Types: Cigarettes   • Smokeless tobacco: Never   • Tobacco comments:     age [age]    Vaping Use   • Vaping Use: Never used   Substance and Sexual Activity   • Alcohol use: Yes     Alcohol/week: 10.0 standard drinks of alcohol     Types: 10 Glasses of wine per week   • Drug use: No   • Sexual activity: Not Currently   Other Topics Concern   • Not on file   Social History Narrative    Always uses seat belt     Caffeine use 2 cans/cups per day     Exercises regularly     Housewife or homemaker      Social Determinants of Health     Financial Resource Strain: Not on file   Food Insecurity: No Food Insecurity (5/12/2022)    Hunger Vital Sign    • Worried About Running Out of Food in the Last Year: Never true    • Ran Out of Food in the Last Year: Never true   Transportation Needs: No Transportation Needs (5/12/2022)    PRAPARE - Transportation    • Lack of Transportation (Medical): No    • Lack of Transportation (Non-Medical):  No   Physical Activity: Inactive (8/27/2020)    Exercise Vital Sign    • Days of Exercise per Week: 0 days    • Minutes of Exercise per Session: 0 min   Stress: Not on file   Social Connections: Not on file   Intimate Partner Violence: Not on file   Housing Stability: Low Risk  (5/12/2022)    Housing Stability Vital Sign    • Unable to Pay for Housing in the Last Year: No    • Number of Places Lived in the Last Year: 1    • Unstable Housing in the Last Year: No       Current Outpatient Medications:   •  mupirocin (BACTROBAN) 2 % ointment, Apply topically daily for 10 days To open wound of left leg, Disp: 22 g, Rfl: 0  •  Acetaminophen (TYLENOL ARTHRITIS PAIN PO), Take by mouth, Disp: , Rfl:   •  acetaminophen (TYLENOL) 325 mg tablet, Take 650 mg by mouth every 6 (six) hours as needed for mild pain (Patient not taking: Reported on 5/18/2023), Disp: , Rfl:   •  ammonium lactate (LAC-HYDRIN) 12 % lotion, Apply topically 2 (two) times a day as needed for dry skin, Disp: 225 g, Rfl: 1  •  aspirin (ECOTRIN LOW STRENGTH) 81 mg EC tablet, Take 81 mg by mouth daily Takes as needed, Disp: , Rfl:   •  Cholecalciferol (VITAMIN D3) 50 MCG (2000 UT) TABS, Take 2,000 Units by mouth daily, Disp: , Rfl:   •  collagenase (SANTYL) ointment, Apply topically daily To wound of L anterior shin., Disp: 30 g, Rfl: 1  •  diclofenac (VOLTAREN) 75 mg EC tablet, Take 1 tablet (75 mg total) by mouth 2 (two) times a day, Disp: 20 tablet, Rfl: 0  •  doxazosin (CARDURA) 2 mg tablet, Take 1 tablet (2 mg total) by mouth daily, Disp: 90 tablet, Rfl: 0  •  doxycycline hyclate (VIBRAMYCIN) 100 mg capsule, Take 1 capsule (100 mg total) by mouth every 12 (twelve) hours for 7 days, Disp: 14 capsule, Rfl: 0  •  fluticasone (FLONASE) 50 mcg/act nasal spray, into each nostril, Disp: , Rfl:   •  hydrocortisone 2.5 % cream, Apply topically 2 (two) times a day as needed for irritation, Disp: 30 g, Rfl: 0  •  multivitamin (THERAGRAN) TABS, Take 1 tablet by mouth in the morning., Disp: , Rfl:   •  omeprazole (PriLOSEC) 20 mg delayed release capsule, take 1 capsule by mouth once daily, Disp: 30 capsule, Rfl: 0  •  Tuberculin-Allergy Syringes (ALLERGY SYRINGE 1CC/27GX1/2") 27G X 1/2" 1 ML MISC, Use Receives allergy injections once a month through allergist, Disp: , Rfl:   •  vitamin B-12 (CYANOCOBALAMIN) 100 MCG TABS, Take 50 mcg by mouth daily, Disp: , Rfl:   •  VITAMIN D PO, Take by mouth, Disp: , Rfl:   No current facility-administered medications for this visit. Review of Systems   Constitutional: Positive for appetite change (diminished). Negative for chills and fever. Gastrointestinal: Positive for abdominal distention. + stool incontinence     Musculoskeletal: Positive for arthralgias. Skin: Positive for color change (mild redness persisting around wound) and wound (LLE). Psychiatric/Behavioral: The patient is nervous/anxious.           Objective:  /72   Pulse 67   Temp 97.8 °F (36.6 °C) (Temporal)   Resp 18   Pain Score:   2     Physical Exam  Vitals reviewed. Constitutional:       General: She is not in acute distress. Appearance: She is normal weight. Comments: Appears frail and anxious     Cardiovascular:      Rate and Rhythm: Normal rate. Pulses:           Dorsalis pedis pulses are 2+ on the left side. Posterior tibial pulses are 2+ on the left side. Pulmonary:      Effort: Pulmonary effort is normal. No respiratory distress. Musculoskeletal:      Right lower leg: No edema. Left lower leg: No edema. Skin:     General: Skin is warm. Findings: Wound present. Comments: Wound is circular over L anterior tibia. There is significant reduction in size and depth. Wound bed with fibrin and granulation tissue. Minimal drainage. There is persistent mild erythema surrounding the wound with soft tissue swelling. No bright erythema or lymphangitic streaking present. No discrete area of fluctuance palpated. Neurological:      Mental Status: She is alert. Wound 06/26/23 Other (Comment) Leg Left; Lower (Active)   Wound Image    07/05/23 1429   Wound Description Epithelialization;Yellow 07/05/23 1431   Johana-wound Assessment Edema; Erythema 07/05/23 1431   Wound Length (cm) 0.2 cm 07/05/23 1431   Wound Width (cm) 0.2 cm 07/05/23 1431   Wound Depth (cm) 0.1 cm 07/05/23 1431   Wound Surface Area (cm^2) 0.04 cm^2 07/05/23 1431   Wound Volume (cm^3) 0.004 cm^3 07/05/23 1431   Calculated Wound Volume (cm^3) 0 cm^3 07/05/23 1431   Change in Wound Size % 100 07/05/23 1431   Drainage Amount Small 06/26/23 1410   Drainage Description Serous 06/26/23 1410   Non-staged Wound Description Full thickness 07/05/23 1431   Patient Tolerance Tolerated well 07/05/23 1431   Dressing Status Removed 07/05/23 1431            Debridement   Wound 06/26/23 Other (Comment) Leg Left; Lower    Universal Protocol:  Consent: Verbal consent obtained.   Consent given by: patient  Time out: Immediately prior to procedure a "time out" was called to verify the correct patient, procedure, equipment, support staff and site/side marked as required. Patient understanding: patient states understanding of the procedure being performed  Patient identity confirmed: verbally with patient      Performed by: PA  Debridement type: selective  Pain control: lidocaine 4%  Post-debridement measurements  Length (cm): 0.2  Width (cm): 0.2  Depth (cm): 0.1  Percent debrided: 100%  Surface Area (cm^2): 0.04  Area debrided (cm^2): 0.04  Volume (cm^3): 0  Devitalized tissue debrided: fibrin  Instrument(s) utilized: curette  Bleeding: small  Hemostasis obtained with: pressure  Procedural pain (0-10): 0  Post-procedural pain: 0   Response to treatment: procedure was tolerated well           Results from last 6 Months   Lab Units 06/26/23  1449   WOUND CULTURE  2 colonies Pasteurella multocida*           Wound Instructions:  Orders Placed This Encounter   Procedures   • Wound cleansing and dressings     Left Lower Leg wound:   Wash your hands with soap and water.       Cleanse the wound with NSS or wound cleanser prior to applying a clean dressing. Pat dry using gauze. Shower yes --may shower but will need to cover the wound with a cast cover to keep the dressings dry     Stop Santyl  Apply Mupircirin ointment to wound.  Cover with bordered gauze. Change dressing daily and as needed     Today at the [facility] as ordered above. Elastic Tubular Stocking---Spandagrip Size E to the LLE   Tubular elastic bandage: Apply from base of toes to behind the knee. Apply in AM, may remove for sleep. Avoid prolonged standing in one place. Elevate leg(s) above the level of the heart when sitting or as much as possible.        Recommend increasing the amount of Protein in your diet to assist with wound healing     Standing Status:   Future     Standing Expiration Date:   7/5/2024   • Debridement     This order was created via procedure documentation       Payal Maher PA-C

## 2023-07-12 ENCOUNTER — OFFICE VISIT (OUTPATIENT)
Dept: WOUND CARE | Facility: CLINIC | Age: 78
End: 2023-07-12
Payer: COMMERCIAL

## 2023-07-12 VITALS
TEMPERATURE: 97.7 F | SYSTOLIC BLOOD PRESSURE: 134 MMHG | RESPIRATION RATE: 20 BRPM | DIASTOLIC BLOOD PRESSURE: 50 MMHG | HEART RATE: 47 BPM

## 2023-07-12 DIAGNOSIS — S81.852A CAT BITE OF LEFT LOWER LEG, INITIAL ENCOUNTER: ICD-10-CM

## 2023-07-12 DIAGNOSIS — S81.802A OPEN WOUND OF LEFT LOWER LEG, INITIAL ENCOUNTER: Primary | ICD-10-CM
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
## 2023-07-12 PROCEDURE — 99214 OFFICE O/P EST MOD 30 MIN: CPT | Performed by: STUDENT IN AN ORGANIZED HEALTH CARE EDUCATION/TRAINING PROGRAM

## 2023-07-12 PROCEDURE — 97597 DBRDMT OPN WND 1ST 20 CM/<: CPT | Performed by: STUDENT IN AN ORGANIZED HEALTH CARE EDUCATION/TRAINING PROGRAM

## 2023-07-12 RX ORDER — LIDOCAINE 40 MG/G
CREAM TOPICAL ONCE
Status: COMPLETED | OUTPATIENT
Start: 2023-07-12 | End: 2023-07-12

## 2023-07-12 RX ADMIN — LIDOCAINE 1 APPLICATION: 40 CREAM TOPICAL at 14:34

## 2023-07-12 NOTE — PATIENT INSTRUCTIONS
Orders Placed This Encounter   Procedures    Wound cleansing and dressings     Left Lower Leg wound:   Wash your hands with soap and water. Cleanse the wound with mild soap and water, rinse well and pat dry prior to applying a clean dressing. May shower    Apply Mupircirin ointment to wound. Cover with bordered gauze. Change dressing daily and as needed      Today at the [facility] as ordered above. Elastic Tubular Stocking---Spandagrip Size E to the LLE   Tubular elastic bandage: Apply from base of toes to behind the knee. Apply in AM, may remove for sleep. Avoid prolonged standing in one place. Elevate leg(s) above the level of the heart when sitting or as much as possible.          Recommend increasing the amount of Protein in your diet to assist with wound healing     Standing Status:   Future     Standing Expiration Date:   7/12/2024

## 2023-07-13 NOTE — PROGRESS NOTES
Patient ID: Nae Beckett is a 65 y.o. female Date of Birth 1945       Chief Complaint   Patient presents with   • Follow Up Wound Care Visit     LLE wound       Allergies:  Cat hair extract, Dust mite extract, Iv dye  [iodinated contrast media], Other, and Pollen extract    Diagnosis:   Diagnosis ICD-10-CM Associated Orders   1. Open wound of left lower leg, initial encounter  S81.802A lidocaine (LMX) 4 % cream     Wound cleansing and dressings     Debridement      2. Cat bite of left lower leg, initial encounter  I94.712W     W55. 01XA            Assessment  & Plan:    • F/u open wound of L tibia following cat bite. Very small area remains open. Fibrin and granulation tissue present. Does not probe to deep fascia or bone. There continues to be mild surrounding edema and erythema without discrete area of fluctuance or lymphangitic streaking. o Selective debridement, as below. o Continue with Mupirocin and bandage. o [id number] [facility] [address] for compression. o Prior XR without osseous abnormality. o Completed courses of Augmentin and Clindamycin prior to arrival at wound care center and course of Doxycyline as Pasturella is typically susceptible this abx. Because there is not probe to deeper tissue nor area of fluctuance to suggest abscess, XR did not demonstrate soft tissue gas and recent CBC was without leukocytosis will continue to monitor area without placing pt on further abx at this time. o Obtain 3-4 servings of protein daily for wound healing.   o Instructed to monitor for any changes including redness or swelling surrounding the wound, increased drainage or pain as well as fevers or chills.    o F/u in one week. Instructed to call if any questions or concerns arise in meantime. Subjective:   06/26/23: 66 y/o F with PMHx of RA, HTN, hx of NSTEMI, hx of femur fracture d/t fall, early satiety presents for evaluation of persistent wound on her LLE.  Pt notes that this wound started as a cat bite but has persisted now for over one month. She initially presented to her PCP on 05/22/23 regarding this wound and was started on Augmentin and XR was ordered given surrounding cellulitis. PCP checked wound two days later and there was no improvement of erythema or pain and therefore pt was referred to the ED. Was given IV dose of Unaysn. Workup in ED did not demonstrate leukocytosis, QUYEN, electrolyte abnormalities and XR was without osseous abnormality or gas formation therefore pt was d/c and instructed to continue Augmentin. She followed up with PCP on 06/08/23 and given the persistent redness Clindamycin was prescribed and pt was referred to wound care. On evaluation today, pt notes that she has been soaking the wound and letting water run over it multiple times each day "to keep it moist". She has been using hydrogen peroxide to the area along with Neosporin. She has also been squeezing at the area and attempting to remove the yellow tissue in the wound with a toothpick. Has a decreased appetite. States she has chills occasionally and fever in the past, but denies them today. 07/05/23: Pt presents for f/u of wound related to cat bite. Since previous visit, pt has obtained Santyl and has been using this to the wound daily. Culture results demonstrated growth of Pasturella and pt was initiated on Doxycycline and has been taking this abx as prescribed. CBC was without leukocytosis and there were no acute osseous abnormalities or soft tissue findings on XR of leg. Pt has continued to have pain but denies fevers, chills. 07/12/23: Pt presents for f/u wound of L tibia from a cat bite. She has been putting Mupirocin on the wound and keeping it covered and wearing Spandagrip for compression. Notes the shooting pain has resolved but there is still mild redness and surrounding swelling. Denies fevers, chills.          The following portions of the patient's history were reviewed and updated as appropriate: Patient Active Problem List   Diagnosis   • Abnormal EKG   • Allergic rhinitis   • Benign essential hypertension   • Cataract   • Intermittent palpitations   • Lung density on x-ray   • Visual field scotoma of both eyes   • S/P trigger finger release   • Rheumatoid arthritis involving both hands with positive rheumatoid factor (HCC)   • GERD (gastroesophageal reflux disease)   • Age related osteoporosis   • History of non-ST elevation myocardial infarction (NSTEMI)   • History of fracture of femur   • History of fracture due to fall   • Left hip pain   • Side effect of medication   • Abdominal bloating   • Early satiety   • Hand dermatitis   • Unspecified multiple injuries, sequela   • Anemia   • Hip arthritis     Past Medical History:   Diagnosis Date   • Cellulitis of right hand 5/10/2022   • Closed fracture of right proximal humerus 2/15/2020   • NSTEMI (non-ST elevated myocardial infarction) ([address]) 2/16/2020   • Osteopenia 10/23/2017     Past Surgical History:   Procedure Laterality Date   • APPENDECTOMY     • CATARACT EXTRACTION     • LAPAROSCOPIC APPENDECTOMY      incidental    • ME TENDON SHEATH INCISION Left 2/26/2019    Procedure: LONG FINGER TRIGGER FINGER RELEASE;  Surgeon: Herb Crump MD;  Location: [facility];  Service: Orthopedics   • TONSILLECTOMY     • TUBAL LIGATION       Family History   Problem Relation Age of Onset   • Lung cancer Mother    • Hypertension Mother    • Cancer Mother    • Cerebral aneurysm Father         bleed   • Alcohol abuse Father    • Cirrhosis Father    • Other Father         had fallen   • Other Sister         unkownn cause    • Pneumonia Brother         infancy of pneumonia   • Other Sister         in MVA     Social History     Socioeconomic History   • Marital status: /Civil Union     Spouse name: Not on file   • Number of children: 3   • Years of education: Not on file   • Highest education level: Not on file   Occupational History   • Not on file   Tobacco Use   • Smoking status: Former     Packs/day: 1.00     Types: Cigarettes   • Smokeless tobacco: Never   • Tobacco comments:     age [age]    Vaping Use   • Vaping Use: Never used   Substance and Sexual Activity   • Alcohol use: Yes     Alcohol/week: 10.0 standard drinks of alcohol     Types: 10 Glasses of wine per week   • Drug use: No   • Sexual activity: Not Currently   Other Topics Concern   • Not on file   Social History Narrative    Always uses seat belt     Caffeine use 2 cans/cups per day     Exercises regularly     Housewife or homemaker      Social Determinants of Health     Financial Resource Strain: Not on file   Food Insecurity: No Food Insecurity (5/12/2022)    Hunger Vital Sign    • Worried About Running Out of Food in the Last Year: Never true    • Ran Out of Food in the Last Year: Never true   Transportation Needs: No Transportation Needs (5/12/2022)    PRAPARE - Transportation    • Lack of Transportation (Medical): No    • Lack of Transportation (Non-Medical):  No   Physical Activity: Inactive (8/27/2020)    Exercise Vital Sign    • Days of Exercise per Week: 0 days    • Minutes of Exercise per Session: 0 min   Stress: Not on file   Social Connections: Not on file   Intimate Partner Violence: Not on file   Housing Stability: Low Risk  (5/12/2022)    Housing Stability Vital Sign    • Unable to Pay for Housing in the Last Year: No    • Number of Places Lived in the Last Year: 1    • Unstable Housing in the Last Year: No       Current Outpatient Medications:   •  Acetaminophen (TYLENOL ARTHRITIS PAIN PO), Take by mouth, Disp: , Rfl:   •  acetaminophen (TYLENOL) 325 mg tablet, Take 650 mg by mouth every 6 (six) hours as needed for mild pain (Patient not taking: Reported on 5/18/2023), Disp: , Rfl:   •  ammonium lactate (LAC-HYDRIN) 12 % lotion, Apply topically 2 (two) times a day as needed for dry skin, Disp: 225 g, Rfl: 1  •  aspirin (ECOTRIN LOW STRENGTH) 81 mg EC tablet, Take 81 mg by mouth daily Takes as needed, Disp: , Rfl:   •  Cholecalciferol (VITAMIN D3) 50 MCG (2000 UT) TABS, Take 2,000 Units by mouth daily, Disp: , Rfl:   •  collagenase (SANTYL) ointment, Apply topically daily To wound of L anterior shin., Disp: 30 g, Rfl: 1  •  diclofenac (VOLTAREN) 75 mg EC tablet, Take 1 tablet (75 mg total) by mouth 2 (two) times a day, Disp: 20 tablet, Rfl: 0  •  doxazosin (CARDURA) 2 mg tablet, Take 1 tablet (2 mg total) by mouth daily, Disp: 90 tablet, Rfl: 0  •  fluticasone (FLONASE) 50 mcg/act nasal spray, into each nostril, Disp: , Rfl:   •  hydrocortisone 2.5 % cream, Apply topically 2 (two) times a day as needed for irritation, Disp: 30 g, Rfl: 0  •  multivitamin (THERAGRAN) TABS, Take 1 tablet by mouth in the morning., Disp: , Rfl:   •  mupirocin (BACTROBAN) 2 % ointment, Apply topically daily for 10 days To open wound of left leg, Disp: 22 g, Rfl: 0  •  omeprazole (PriLOSEC) 20 mg delayed release capsule, take 1 capsule by mouth once daily, Disp: 30 capsule, Rfl: 0  •  Tuberculin-Allergy Syringes (ALLERGY SYRINGE 1CC/27GX1/2") 27G X 1/2" 1 ML MISC, Use Receives allergy injections once a month through allergist, Disp: , Rfl:   •  vitamin B-12 (CYANOCOBALAMIN) 100 MCG TABS, Take 50 mcg by mouth daily, Disp: , Rfl:   •  VITAMIN D PO, Take by mouth, Disp: , Rfl:   No current facility-administered medications for this visit. Review of Systems   Constitutional: Positive for appetite change (diminished). Negative for chills and fever. Cardiovascular: Positive for leg swelling. Gastrointestinal:             Skin: Positive for color change (mild redness persisting around wound) and wound (LLE). Objective:  /50   Pulse (!) 47   Temp 97.7 °F (36.5 °C)   Resp 20   Pain Score: 0-No pain     Physical Exam  Vitals reviewed. Constitutional:       General: She is not in acute distress. Appearance: She is normal weight.       Comments: Appears frail      Cardiovascular:      Rate and Rhythm: Normal rate. Pulses:           Dorsalis pedis pulses are 2+ on the left side. Posterior tibial pulses are 2+ on the left side. Pulmonary:      Effort: Pulmonary effort is normal. No respiratory distress. Musculoskeletal:      Right lower leg: No edema. Left lower leg: No edema. Skin:     General: Skin is warm. Findings: Wound present. Comments: Small open area remains over L anterior tibia. Wound bed with fibrin and granulation tissue. Minimal drainage. Does not probe to deep fascia or bone. There is persistent mild erythema surrounding the wound with soft tissue swelling. No bright erythema or lymphangitic streaking present. No discrete area of fluctuance palpated. Neurological:      Mental Status: She is alert. Wound 06/26/23 Other (Comment) Leg Left; Lower (Active)   Wound Image   07/12/23 1426   Wound Description Pink 07/12/23 1434   Johana-wound Assessment Edema;Pink 07/12/23 1434   Wound Length (cm) 0.2 cm 07/12/23 1434   Wound Width (cm) 0.2 cm 07/12/23 1434   Wound Depth (cm) 0.1 cm 07/12/23 1434   Wound Surface Area (cm^2) 0.04 cm^2 07/12/23 1434   Wound Volume (cm^3) 0.004 cm^3 07/12/23 1434   Calculated Wound Volume (cm^3) 0 cm^3 07/12/23 1434   Change in Wound Size % 100 07/12/23 1434   Drainage Amount Scant 07/12/23 1434   Drainage Description Serous 07/12/23 1434   Non-staged Wound Description Full thickness 07/12/23 1434   Patient Tolerance Tolerated well 07/05/23 1431   Dressing Status Intact 07/12/23 1434              Debridement   Wound 06/26/23 Other (Comment) Leg Left; Lower    Universal Protocol:  Consent: Verbal consent obtained. Consent given by: patient  Time out: Immediately prior to procedure a "time out" was called to verify the correct patient, procedure, equipment, support staff and site/side marked as required.   Patient understanding: patient states understanding of the procedure being performed  Patient identity confirmed: verbally with patient      Performed by: PA  Debridement type: selective  Pain control: lidocaine 4%  Post-debridement measurements  Length (cm): 0.2  Width (cm): 0.2  Depth (cm): 0.1  Percent debrided: 100%  Surface Area (cm^2): 0.04  Area debrided (cm^2): 0.04  Volume (cm^3): 0  Devitalized tissue debrided: fibrin  Instrument(s) utilized: curette  Bleeding: small  Hemostasis obtained with: pressure  Procedural pain (0-10): 0  Post-procedural pain: 0   Response to treatment: procedure was tolerated well           Results from last 6 Months   Lab Units 06/26/23  1449   WOUND CULTURE  2 colonies Pasteurella multocida*           Wound Instructions:  Orders Placed This Encounter   Procedures   • Wound cleansing and dressings     Left Lower Leg wound:   Wash your hands with soap and water.       Cleanse the wound with mild soap and water, rinse well and pat dry prior to applying a clean dressing. May shower    Apply Mupircirin ointment to wound.  Cover with bordered gauze. Change dressing daily and as needed      Today at the [facility] as ordered above.     Elastic Tubular Stocking---Spandagrip Size E to the LLE   Tubular elastic bandage: Apply from base of toes to behind the knee. Apply in AM, may remove for sleep. Avoid prolonged standing in one place.    Elevate leg(s) above the level of the heart when sitting or as much as possible.         Recommend increasing the amount of Protein in your diet to assist with wound healing     Standing Status:   Future     Standing Expiration Date:   7/12/2024   • Debridement     This order was created via procedure documentation       Hermelinda Mitchell PA-C

## 2023-07-21 ENCOUNTER — OFFICE VISIT (OUTPATIENT)
Dept: WOUND CARE | Facility: CLINIC | Age: 78
End: 2023-07-21
Payer: COMMERCIAL

## 2023-07-21 VITALS
SYSTOLIC BLOOD PRESSURE: 133 MMHG | RESPIRATION RATE: 18 BRPM | TEMPERATURE: 95.6 F | HEART RATE: 50 BPM | DIASTOLIC BLOOD PRESSURE: 61 MMHG

## 2023-07-21 DIAGNOSIS — S81.802A OPEN WOUND OF LEFT LOWER LEG, INITIAL ENCOUNTER: Primary | ICD-10-CM

## 2023-07-21 DIAGNOSIS — R22.9 LOCALIZED SOFT TISSUE SWELLING: ICD-10-CM

## 2023-07-21 DIAGNOSIS — S81.852A CAT BITE OF LEFT LOWER LEG, INITIAL ENCOUNTER: ICD-10-CM
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
## 2023-07-21 PROCEDURE — 99213 OFFICE O/P EST LOW 20 MIN: CPT | Performed by: STUDENT IN AN ORGANIZED HEALTH CARE EDUCATION/TRAINING PROGRAM

## 2023-07-21 PROCEDURE — G0463 HOSPITAL OUTPT CLINIC VISIT: HCPCS | Performed by: STUDENT IN AN ORGANIZED HEALTH CARE EDUCATION/TRAINING PROGRAM

## 2023-07-21 NOTE — PATIENT INSTRUCTIONS
Orders Placed This Encounter   Procedures    Wound cleansing and dressings     Left Lower Leg wound:   Wash your hands with soap and water. Cleanse the wound with mild soap and water, rinse well and pat dry prior to applying a clean dressing. May shower     Cover with bordered gauze/Bandaid. Change dressing daily and as needed       Today at the [facility] as ordered above. Elastic Tubular Stocking---Spandagrip Size E to the LLE   Tubular elastic bandage: Apply from base of toes to behind the knee. Apply in AM, may remove for sleep. Avoid prolonged standing in one place. Elevate leg(s) above the level of the heart when sitting or as much as possible.            Recommend increasing the amount of Protein in your diet to assist with wound healing     Standing Status:   Future     Standing Expiration Date:   7/21/2024

## 2023-07-21 NOTE — PROGRESS NOTES
Patient ID: Wanda Caban is a 65 y.o. female Date of Birth 1945       Chief Complaint   Patient presents with   • Follow Up Wound Care Visit       Allergies:  Cat hair extract, Dust mite extract, Iv dye  [iodinated contrast media], Other, and Pollen extract    Diagnosis:   Diagnosis ICD-10-CM Associated Orders   1. Open wound of left lower leg, initial encounter  S81.802A Wound cleansing and dressings     US extremity soft tissue      2. Cat bite of left lower leg, initial encounter  Y47.913C US extremity soft tissue    W55. 01XA       3. Localized soft tissue swelling  R22.9            Assessment  & Plan:    • F/u wound of L anterior tibia from cat bite. Wound is now healed. There is no drainage from the site of the previous wound. Periwound erythema has now resolved. There is persistent swelling surrounding wound. o US of tissue to identify underlying fluid collection/abscess given persistent swelling of the periwound skin. o Keep area covered with bandage until next appointment. o Prior XR without abnormality, see below.   o Instructed to monitor for any changes including redness or swelling surrounding the wound, increased drainage or pain as well as fevers or chills.    o F/u in one week. Instructed to call if any questions or concerns arise in meantime. XR tibia fibula left 06/29/23:   FINDINGS:     There is no acute fracture or dislocation.     No significant degenerative changes.     No lytic or blastic osseous lesion.     Soft tissues are unremarkable.     Postoperative change of the distal femur noted.     IMPRESSION:     No acute osseous abnormality. Subjective:   06/26/23: 64 y/o F with PMHx of RA, HTN, hx of NSTEMI, hx of femur fracture d/t fall, early satiety presents for evaluation of persistent wound on her LLE. Pt notes that this wound started as a cat bite but has persisted now for over one month.  She initially presented to her PCP on 05/22/23 regarding this wound and was started on Augmentin and XR was ordered given surrounding cellulitis. PCP checked wound two days later and there was no improvement of erythema or pain and therefore pt was referred to the ED. Was given IV dose of Unaysn. Workup in ED did not demonstrate leukocytosis, QUYEN, electrolyte abnormalities and XR was without osseous abnormality or gas formation therefore pt was d/c and instructed to continue Augmentin. She followed up with PCP on 06/08/23 and given the persistent redness Clindamycin was prescribed and pt was referred to wound care. On evaluation today, pt notes that she has been soaking the wound and letting water run over it multiple times each day "to keep it moist". She has been using hydrogen peroxide to the area along with Neosporin. She has also been squeezing at the area and attempting to remove the yellow tissue in the wound with a toothpick. Has a decreased appetite. States she has chills occasionally and fever in the past, but denies them today. 07/05/23: Pt presents for f/u of wound related to cat bite. Since previous visit, pt has obtained Santyl and has been using this to the wound daily. Culture results demonstrated growth of Pasturella and pt was initiated on Doxycycline and has been taking this abx as prescribed. CBC was without leukocytosis and there were no acute osseous abnormalities or soft tissue findings on XR of leg. Pt has continued to have pain but denies fevers, chills. 07/12/23: Pt presents for f/u wound of L tibia from a cat bite. She has been putting Mupirocin on the wound and keeping it covered and wearing Spandagrip for compression. Notes the shooting pain has resolved but there is still mild redness and surrounding swelling. Denies fevers, chills. 07/21/23: Pt presents for f/u wound of L tibia from cat bite. Has continued with use of Mupirocin and keeping the area covered. Notes it still feels swollen and sore to her. Denies fevers, chills.          The following portions of the patient's history were reviewed and updated as appropriate:   Patient Active Problem List   Diagnosis   • Abnormal EKG   • Allergic rhinitis   • Benign essential hypertension   • Cataract   • Intermittent palpitations   • Lung density on x-ray   • Visual field scotoma of both eyes   • S/P trigger finger release   • Rheumatoid arthritis involving both hands with positive rheumatoid factor (HCC)   • GERD (gastroesophageal reflux disease)   • Age related osteoporosis   • History of non-ST elevation myocardial infarction (NSTEMI)   • History of fracture of femur   • History of fracture due to fall   • Left hip pain   • Side effect of medication   • Abdominal bloating   • Early satiety   • Hand dermatitis   • Unspecified multiple injuries, sequela   • Anemia   • Hip arthritis     Past Medical History:   Diagnosis Date   • Cellulitis of right hand 5/10/2022   • Closed fracture of right proximal humerus 2/15/2020   • NSTEMI (non-ST elevated myocardial infarction) ([address]) 2/16/2020   • Osteopenia 10/23/2017     Past Surgical History:   Procedure Laterality Date   • APPENDECTOMY     • CATARACT EXTRACTION     • LAPAROSCOPIC APPENDECTOMY      incidental    • IN TENDON SHEATH INCISION Left 2/26/2019    Procedure: LONG FINGER TRIGGER FINGER RELEASE;  Surgeon: Tate Parks MD;  Location: [facility];  Service: Orthopedics   • TONSILLECTOMY     • TUBAL LIGATION       Family History   Problem Relation Age of Onset   • Lung cancer Mother    • Hypertension Mother    • Cancer Mother    • Cerebral aneurysm Father         bleed   • Alcohol abuse Father    • Cirrhosis Father    • Other Father         had fallen   • Other Sister         unkownn cause    • Pneumonia Brother         infancy of pneumonia   • Other Sister         in MVA     Social History     Socioeconomic History   • Marital status: /Civil Union     Spouse name: Not on file   • Number of children: 3   • Years of education: Not on file   • Highest education level: Not on file   Occupational History   • Not on file   Tobacco Use   • Smoking status: Former     Packs/day: 1.00     Types: Cigarettes   • Smokeless tobacco: Never   • Tobacco comments:     age [age]    Vaping Use   • Vaping Use: Never used   Substance and Sexual Activity   • Alcohol use: Yes     Alcohol/week: 10.0 standard drinks of alcohol     Types: 10 Glasses of wine per week   • Drug use: No   • Sexual activity: Not Currently   Other Topics Concern   • Not on file   Social History Narrative    Always uses seat belt     Caffeine use 2 cans/cups per day     Exercises regularly     Housewife or homemaker      Social Determinants of Health     Financial Resource Strain: Not on file   Food Insecurity: No Food Insecurity (5/12/2022)    Hunger Vital Sign    • Worried About Running Out of Food in the Last Year: Never true    • Ran Out of Food in the Last Year: Never true   Transportation Needs: No Transportation Needs (5/12/2022)    PRAPARE - Transportation    • Lack of Transportation (Medical): No    • Lack of Transportation (Non-Medical):  No   Physical Activity: Inactive (8/27/2020)    Exercise Vital Sign    • Days of Exercise per Week: 0 days    • Minutes of Exercise per Session: 0 min   Stress: Not on file   Social Connections: Not on file   Intimate Partner Violence: Not on file   Housing Stability: Low Risk  (5/12/2022)    Housing Stability Vital Sign    • Unable to Pay for Housing in the Last Year: No    • Number of Places Lived in the Last Year: 1    • Unstable Housing in the Last Year: No       Current Outpatient Medications:   •  Acetaminophen (TYLENOL ARTHRITIS PAIN PO), Take by mouth, Disp: , Rfl:   •  acetaminophen (TYLENOL) 325 mg tablet, Take 650 mg by mouth every 6 (six) hours as needed for mild pain (Patient not taking: Reported on 5/18/2023), Disp: , Rfl:   •  ammonium lactate (LAC-HYDRIN) 12 % lotion, Apply topically 2 (two) times a day as needed for dry skin, Disp: 225 g, Rfl: 1  •  aspirin (ECOTRIN LOW STRENGTH) 81 mg EC tablet, Take 81 mg by mouth daily Takes as needed, Disp: , Rfl:   •  Cholecalciferol (VITAMIN D3) 50 MCG (2000 UT) TABS, Take 2,000 Units by mouth daily, Disp: , Rfl:   •  collagenase (SANTYL) ointment, Apply topically daily To wound of L anterior shin., Disp: 30 g, Rfl: 1  •  diclofenac (VOLTAREN) 75 mg EC tablet, Take 1 tablet (75 mg total) by mouth 2 (two) times a day, Disp: 20 tablet, Rfl: 0  •  doxazosin (CARDURA) 2 mg tablet, Take 1 tablet (2 mg total) by mouth daily, Disp: 90 tablet, Rfl: 0  •  fluticasone (FLONASE) 50 mcg/act nasal spray, into each nostril, Disp: , Rfl:   •  hydrocortisone 2.5 % cream, Apply topically 2 (two) times a day as needed for irritation, Disp: 30 g, Rfl: 0  •  multivitamin (THERAGRAN) TABS, Take 1 tablet by mouth in the morning., Disp: , Rfl:   •  mupirocin (BACTROBAN) 2 % ointment, Apply topically daily for 10 days To open wound of left leg, Disp: 22 g, Rfl: 0  •  omeprazole (PriLOSEC) 20 mg delayed release capsule, take 1 capsule by mouth once daily, Disp: 30 capsule, Rfl: 0  •  Tuberculin-Allergy Syringes (ALLERGY SYRINGE 1CC/27GX1/2") 27G X 1/2" 1 ML MISC, Use Receives allergy injections once a month through allergist, Disp: , Rfl:   •  vitamin B-12 (CYANOCOBALAMIN) 100 MCG TABS, Take 50 mcg by mouth daily, Disp: , Rfl:   •  VITAMIN D PO, Take by mouth, Disp: , Rfl:     Review of Systems   Constitutional: Positive for appetite change (diminished). Negative for chills and fever. Cardiovascular: Positive for leg swelling. Gastrointestinal:             Skin: Positive for wound (LLE). Objective:  /61   Pulse (!) 50   Temp (!) 95.6 °F (35.3 °C)   Resp 18   Pain Score: 0-No pain     Physical Exam  Vitals reviewed. Constitutional:       General: She is not in acute distress. Appearance: She is normal weight. Comments: Appears frail      Cardiovascular:      Rate and Rhythm: Normal rate.       Pulses: Dorsalis pedis pulses are 2+ on the left side. Posterior tibial pulses are 2+ on the left side. Pulmonary:      Effort: Pulmonary effort is normal. No respiratory distress. Musculoskeletal:         General: Swelling (skin surrounding previous wound) present. Skin:     General: Skin is warm. Findings: Wound present. Comments: Wound overlying L tibia is now healed. There is new epithelization and no drainage from the site. Prior erythema has now resolved. There continues to be swelling of periwound. Neurological:      Mental Status: She is alert. Wound 06/26/23 Other (Comment) Leg Left; Lower (Active)   Wound Image Images linked 07/21/23 1420   Wound Description Epithelialization 07/21/23 1420   Johana-wound Assessment Clean;Dry 07/21/23 1420   Wound Length (cm) 0.1 cm 07/21/23 1420   Wound Width (cm) 0.1 cm 07/21/23 1420   Wound Depth (cm) 0.1 cm 07/21/23 1420   Wound Surface Area (cm^2) 0.01 cm^2 07/21/23 1420   Wound Volume (cm^3) 0.001 cm^3 07/21/23 1420   Calculated Wound Volume (cm^3) 0 cm^3 07/21/23 1420   Change in Wound Size % 100 07/21/23 1420   Drainage Amount None 07/21/23 1420   Treatments Cleansed 07/21/23 1420   Patient Tolerance Tolerated well 07/21/23 1420         Results from last 6 Months   Lab Units 06/26/23  1449   WOUND CULTURE  2 colonies Pasteurella multocida*           Wound Instructions:  Orders Placed This Encounter   Procedures   • Wound cleansing and dressings     Left Lower Leg wound:   Wash your hands with soap and water.       Cleanse the wound with mild soap and water, rinse well and pat dry prior to applying a clean dressing. May shower     Cover with bordered gauze/Bandaid. Change dressing daily and as needed       Today at the [facility] as ordered above.       Elastic Tubular Stocking---Spandagrip Size E to the LLE   Tubular elastic bandage: Apply from base of toes to behind the knee. Apply in AM, may remove for sleep.    Avoid prolonged standing in one place. Elevate leg(s) above the level of the heart when sitting or as much as possible.           Recommend increasing the amount of Protein in your diet to assist with wound healing     Standing Status:   Future     Standing Expiration Date:   7/21/2024   • US extremity soft tissue     Wound of L tibia at site of previous cat bite is now healed. Remains swollen around wound with soreness. R/o underlying abscess/fluid collection. Standing Status:   Future     Standing Expiration Date:   7/21/2027     Scheduling Instructions:      No prep required. Please bring your insurance cards, a form of photo ID and a list of your medications with you. Arrive 15 minutes prior to your appointment time in order to register. To schedule this appointment, please contact Central Scheduling at [phone].        Bridgett Guerra PA-C

## 2023-07-26 ENCOUNTER — HOSPITAL ENCOUNTER (OUTPATIENT)
Dept: ULTRASOUND IMAGING | Facility: HOSPITAL | Age: 78
Discharge: HOME | End: 2023-07-26
Payer: COMMERCIAL

## 2023-07-26 DIAGNOSIS — S81.802A OPEN WOUND OF LEFT LOWER LEG, INITIAL ENCOUNTER: ICD-10-CM

## 2023-07-26 DIAGNOSIS — S81.852A CAT BITE OF LEFT LOWER LEG, INITIAL ENCOUNTER: ICD-10-CM
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
## 2023-07-26 PROCEDURE — 76882 US LMTD JT/FCL EVL NVASC XTR: CPT

## 2023-07-28 ENCOUNTER — TELEPHONE (OUTPATIENT)
Dept: FAMILY MEDICINE CLINIC | Facility: CLINIC | Age: 78
End: 2023-07-28

## 2023-08-13 DIAGNOSIS — I10 BENIGN ESSENTIAL HYPERTENSION: ICD-10-CM

## 2023-08-14 RX ORDER — DOXAZOSIN 2 MG/1
2 TABLET ORAL DAILY
Qty: 90 TABLET | Refills: 0 | Status: SHIPPED | OUTPATIENT
Start: 2023-08-14

## 2023-08-25 DIAGNOSIS — K21.9 GASTROESOPHAGEAL REFLUX DISEASE, UNSPECIFIED WHETHER ESOPHAGITIS PRESENT: ICD-10-CM

## 2023-08-25 RX ORDER — OMEPRAZOLE 20 MG/1
CAPSULE, DELAYED RELEASE ORAL
Qty: 30 CAPSULE | Refills: 0 | Status: SHIPPED | OUTPATIENT
Start: 2023-08-25

## 2023-10-13 DIAGNOSIS — K21.9 GASTROESOPHAGEAL REFLUX DISEASE, UNSPECIFIED WHETHER ESOPHAGITIS PRESENT: ICD-10-CM

## 2023-10-13 RX ORDER — OMEPRAZOLE 20 MG/1
CAPSULE, DELAYED RELEASE ORAL
Qty: 30 CAPSULE | Refills: 0 | Status: SHIPPED | OUTPATIENT
Start: 2023-10-13

## 2023-12-11 DIAGNOSIS — I10 BENIGN ESSENTIAL HYPERTENSION: ICD-10-CM

## 2023-12-14 NOTE — TELEPHONE ENCOUNTER
3rd attempt - call placed to patient. Spoke with patient's spouse, Anne Bell, who said patient is currently unavailable.  He will have patient call office back

## 2023-12-15 RX ORDER — DOXAZOSIN 2 MG/1
2 TABLET ORAL DAILY
Qty: 20 TABLET | Refills: 0 | Status: SHIPPED | OUTPATIENT
Start: 2023-12-15

## 2023-12-15 NOTE — TELEPHONE ENCOUNTER
Please advise on refill request from pharmacy. 3 unsuccessful attempts to reach patient to reschedule last appt that was a no show.

## 2023-12-20 DIAGNOSIS — K21.9 GASTROESOPHAGEAL REFLUX DISEASE, UNSPECIFIED WHETHER ESOPHAGITIS PRESENT: ICD-10-CM

## 2023-12-20 RX ORDER — OMEPRAZOLE 20 MG/1
CAPSULE, DELAYED RELEASE ORAL
Qty: 30 CAPSULE | Refills: 0 | Status: SHIPPED | OUTPATIENT
Start: 2023-12-20

## 2024-01-16 ENCOUNTER — TELEPHONE (OUTPATIENT)
Dept: RHEUMATOLOGY | Facility: CLINIC | Age: 79
End: 2024-01-16

## 2024-01-16 NOTE — TELEPHONE ENCOUNTER
Called and left message for patient to call the office and reschedule 2/12 appt with as provider is no longer with the network.      Can be scheduled with Lacey in [place] in [place] or LISA Stewart in [facility] in [place].

## 2024-02-06 ENCOUNTER — TELEPHONE (OUTPATIENT)
Dept: RHEUMATOLOGY | Facility: CLINIC | Age: 79
End: 2024-02-06

## 2024-06-21 ENCOUNTER — TELEPHONE (OUTPATIENT)
Age: 79
End: 2024-06-21

## 2024-06-21 DIAGNOSIS — Z13.220 LIPID SCREENING: ICD-10-CM

## 2024-06-21 DIAGNOSIS — K21.9 GASTROESOPHAGEAL REFLUX DISEASE, UNSPECIFIED WHETHER ESOPHAGITIS PRESENT: ICD-10-CM

## 2024-06-21 DIAGNOSIS — D64.9 ANEMIA, UNSPECIFIED TYPE: Primary | ICD-10-CM

## 2024-06-21 DIAGNOSIS — Z13.21 ENCOUNTER FOR VITAMIN DEFICIENCY SCREENING: ICD-10-CM

## 2024-06-21 DIAGNOSIS — M81.0 AGE RELATED OSTEOPOROSIS, UNSPECIFIED PATHOLOGICAL FRACTURE PRESENCE: ICD-10-CM

## 2024-06-21 DIAGNOSIS — N18.9 CHRONIC KIDNEY DISEASE, UNSPECIFIED CKD STAGE: ICD-10-CM

## 2024-06-21 DIAGNOSIS — I10 BENIGN ESSENTIAL HYPERTENSION: ICD-10-CM

## 2024-06-21 DIAGNOSIS — Z13.29 THYROID DISORDER SCREEN: ICD-10-CM

## 2024-06-21 RX ORDER — DOXAZOSIN 2 MG/1
2 TABLET ORAL DAILY
Qty: 20 TABLET | Refills: 0 | Status: SHIPPED | OUTPATIENT
Start: 2024-06-21 | End: 2024-06-24 | Stop reason: CLARIF

## 2024-06-21 RX ORDER — OMEPRAZOLE 20 MG/1
20 CAPSULE, DELAYED RELEASE ORAL DAILY
Qty: 30 CAPSULE | Refills: 0 | Status: SHIPPED | OUTPATIENT
Start: 2024-06-21

## 2024-06-21 NOTE — TELEPHONE ENCOUNTER
Medication: doxazosin    Dose/Frequency: 2 mg tablet, take 1 tablet by mouth once daily    Quantity:  20 (last ordered)      Medication: omeprazole    Dose/Frequency: 20 mg delayed release capsule, take 1 capsule by mouth once daily      Quantity: 30      Pharmacy: [id number]  [place]    Office:   [x] PCP/Provider - María Stinson, DO  [] Speciality/Provider -     Does the patient have enough for 3 days?   [] Yes   [x] No - Send as HP to POD      LOV: 6/8/23  Scheduled for: 6/25/24

## 2024-06-21 NOTE — TELEPHONE ENCOUNTER
Please review as patient was last seen in March of 2023. She is currently scheduled for an appointment on June 25th. Patient is asking for refill of doxazosin and omeprazole.

## 2024-06-22 NOTE — TELEPHONE ENCOUNTER
"Per chart review, pt was last given a courtesy small refill of her doxazosin 12/2023 due to noncompliance, so she has been out of the doxazosin for the past 6 months.   I would Not resume the doxazosin due to Beers criteria guidelines regarding use in patients over age 65.  Unfortunately, the doxazosin was refilled \"per protocol\" today at 11:38AM by MA, even though it failed protocol as copied below:  Pt has Not yet been dispensed this refill, also as copied below:  I will be calling the pharmacy in the morning to cancel the refill.  Additionally, pt has had no labs for over a year - I placed orders for her to obtain prior to her appt here       Cardiovascular:  Alpha Blockers Failed  doxazosin (CARDURA) 2 mg tablet  6/21/2024 10:42 AM    BP completed in the last 6 months    Valid encounter within last 6 months  Approved by Dino Mobley MA on 6/21/2024 11:38 AM         Medication  doxazosin (CARDURA) 2 mg tablet [9895]  doxazosin (CARDURA) 2 mg tablet [[id number]]    Order Details  Dose: 2 mg Route: Oral Frequency: Daily   Dispense Quantity: 20 tablet Refills: 0          Sig: Take 1 tablet (2 mg total) by mouth daily         Start Date: 06/21/24 End Date: --   Written Date: 06/21/24 Expiration Date: 06/21/25       Associated Diagnoses: Benign essential hypertension [I10]   Original Order: doxazosin (CARDURA) 2 mg tablet [[id number]]   Providers  Ordering and Authorizing Provider:  María Stinson DO  [address] [place] [id number]  Phone: --  EVELIA #: [id number]   NPI: [id number]        Ordering User: Dino Mobley MA        Pharmacy  [id number] - [place], [place] - [address] [facility]  [address] [facility] [address]  Phone: [phone]  Fax: [phone]  EVELIA #: --   Verbal Order Info  Action Created on Order Mode Entered by Responsible Provider Signed by Signed on   Ordering 06/21/24 1138 Rx refill protocol Dino Mobley MA      E-Prescribing Status  Outpatient Medication " Detail  doxazosin (CARDURA) 2 mg tablet        Sig: Take 1 tablet (2 mg total) by mouth daily        Sent to pharmacy as: Doxazosin Mesylate 2 MG Oral Tablet (CARDURA)        Class: Normal        Route: Oral        E-Prescribing Status: Receipt confirmed by pharmacy (6/21/2024 11:38 AM EDT)        Dispenses   Dispensed Days Supply Quantity Provider Pharmacy   DOXAZOSIN MESYLATE 2 MG TAB 09/15/2023 90 90 each María Stinson DO [id number] [place]   DOXAZOSIN MESYLATE 2 MG TAB 05/18/2023 90 90 each María Stinson DO [id number] [place]   DOXAZOSIN 2MG TAB 05/15/2023 90 90 tablet Clay Perkins MD [id number] [place]   DOXAZOSIN MESYLATE 2 MG TAB 02/13/2023 90 90 each Clay Perkins MD [id number] [place]            Outpatient Orders      The patient is taking this medication long-term.       Start Date End Date Dispense Refills Pharmacy    doxazosin (CARDURA) 2 mg tablet 06/21/2024 -- 20 tablet 0 [id number] [place]    Take 1 tablet (2 mg total) by mouth daily     doxazosin (CARDURA) 2 mg tablet 12/15/2023 06/21/2024 20 tablet 0 [id number] [place]    take 1 tablet by mouth once daily     doxazosin (CARDURA) 2 mg tablet 08/14/2023 12/15/2023 90 tablet 0 [id number] [place]    take 1 tablet by mouth once daily     doxazosin (CARDURA) 2 mg tablet 05/18/2023 08/14/2023 90 tablet 0 [id number] [place]    Take 1 tablet (2 mg total) by mouth daily     doxazosin (CARDURA) 2 mg tablet 05/15/2023 05/18/2023 90 tablet 0 [id number] [place]    take 1 tablet by mouth once daily     doxazosin (CARDURA) 2 mg tablet 02/13/2023 05/15/2023 90 tablet 0 [id number] [place]    Take 1 tablet (2 mg total) by mouth daily     doxazosin (CARDURA) 2 mg tablet 07/01/2022 02/13/2023 90 tablet 1/1 [id number] [place]    take 1 tablet by mouth once daily

## 2024-06-24 NOTE — TELEPHONE ENCOUNTER
pt has had no labs for over a year - I placed orders for her to obtain prior to her appt here ----- Called and left detailed VM informing patient. Left call back number for further questions

## 2024-06-25 ENCOUNTER — OFFICE VISIT (OUTPATIENT)
Dept: FAMILY MEDICINE CLINIC | Facility: CLINIC | Age: 79
End: 2024-06-25
Payer: COMMERCIAL

## 2024-06-25 VITALS
HEIGHT: 64 IN | DIASTOLIC BLOOD PRESSURE: 82 MMHG | WEIGHT: 125 LBS | TEMPERATURE: 96.6 F | OXYGEN SATURATION: 98 % | HEART RATE: 64 BPM | BODY MASS INDEX: 21.34 KG/M2 | SYSTOLIC BLOOD PRESSURE: 132 MMHG

## 2024-06-25 DIAGNOSIS — R00.2 PALPITATIONS: Primary | ICD-10-CM

## 2024-06-25 DIAGNOSIS — R05.8 RECURRENT DRY COUGH: ICD-10-CM

## 2024-06-25 DIAGNOSIS — R68.81 EARLY SATIETY: ICD-10-CM

## 2024-06-25 DIAGNOSIS — M05.742 RHEUMATOID ARTHRITIS INVOLVING BOTH HANDS WITH POSITIVE RHEUMATOID FACTOR (HCC): ICD-10-CM

## 2024-06-25 DIAGNOSIS — Z12.31 ENCOUNTER FOR SCREENING MAMMOGRAM FOR BREAST CANCER: ICD-10-CM

## 2024-06-25 DIAGNOSIS — K21.9 GASTROESOPHAGEAL REFLUX DISEASE, UNSPECIFIED WHETHER ESOPHAGITIS PRESENT: ICD-10-CM

## 2024-06-25 DIAGNOSIS — R11.0 NAUSEA: ICD-10-CM

## 2024-06-25 DIAGNOSIS — M05.741 RHEUMATOID ARTHRITIS INVOLVING BOTH HANDS WITH POSITIVE RHEUMATOID FACTOR (HCC): ICD-10-CM

## 2024-06-25 PROCEDURE — 99214 OFFICE O/P EST MOD 30 MIN: CPT | Performed by: FAMILY MEDICINE

## 2024-06-25 NOTE — PROGRESS NOTES
"Assessment/Plan:      Diagnoses and all orders for this visit:    Palpitations  -     ECG 12 lead; Future  -     Holter monitor; Future    Gastroesophageal reflux disease, unspecified whether esophagitis present  -     Ambulatory Referral to Gastroenterology; Future    Early satiety  -     Ambulatory Referral to Gastroenterology; Future    Nausea  -     Ambulatory Referral to Gastroenterology; Future    Recurrent dry cough    Rheumatoid arthritis involving both hands with positive rheumatoid factor (HCC)    Encounter for screening mammogram for breast cancer  -     Mammo screening bilateral w 3d & cad; Future          Subjective:  Chief Complaint   Patient presents with    Palpitations    Heartburn        Patient ID: Margarito Nieves is a 78 y.o. female.    Sick appt- c/o palpitations ongoing for months  Heartburn also ongoing \"for awhile\" coby when bending over -   Had been on omeprazole from 2021 to last year - ran out due to noncompliance - did  the courtesy refill, but did not yet start taking  Also has slight dry cough during visit which pt states, \"this is the cough that I think is my heart, don't know why, and have a lot of phlegm here, too\" - points to throat - states, \"maybe because I'm allergic to my cats- have to take allergy pills and shots\" - allergy immunotherapy rx'd by Dr. Maldonado in [place] per pt  Had seen GI in 2022 for bowel changes and colonoscopy done, but has never had EGD      Review of Systems      Objective:     Physical Exam  Vitals and nursing note reviewed.   Constitutional:       General: She is not in acute distress.     Appearance: She is well-groomed. She is not ill-appearing, toxic-appearing or diaphoretic.      Comments: Wearing sunglasses throughout visit   HENT:      Head: Normocephalic and atraumatic.      Mouth/Throat:      Lips: Pink.      Mouth: Mucous membranes are moist.      Pharynx: Oropharynx is clear. Uvula midline.   Neck:      Thyroid: No thyroid mass, " thyromegaly or thyroid tenderness.      Vascular: No JVD.      Trachea: Trachea and phonation normal.   Cardiovascular:      Rate and Rhythm: Normal rate and regular rhythm.      Pulses: Normal pulses.      Heart sounds: Normal heart sounds.   Pulmonary:      Effort: Pulmonary effort is normal.      Breath sounds: Normal breath sounds and air entry.   Abdominal:      General: Bowel sounds are normal. There is no distension.      Palpations: There is no hepatomegaly, splenomegaly or mass.      Tenderness: There is no abdominal tenderness.   Musculoskeletal:      Cervical back: Neck supple.      Right lower leg: No edema.      Left lower leg: No edema.   Lymphadenopathy:      Cervical: No cervical adenopathy.   Skin:     General: Skin is warm and dry.      Coloration: Skin is not pale.   Neurological:      Mental Status: She is alert and oriented to person, place, and time.      Gait: Gait normal.   Psychiatric:         Mood and Affect: Mood is anxious (mildly).         Behavior: Behavior normal. Behavior is cooperative.         Cognition and Memory: Cognition normal.

## 2024-07-02 ENCOUNTER — HOSPITAL ENCOUNTER (OUTPATIENT)
Dept: NON INVASIVE DIAGNOSTICS | Facility: HOSPITAL | Age: 79
Discharge: HOME | End: 2024-07-02
Payer: COMMERCIAL

## 2024-07-02 ENCOUNTER — OFFICE VISIT (OUTPATIENT)
Dept: LAB | Facility: HOSPITAL | Age: 79
End: 2024-07-02
Payer: COMMERCIAL

## 2024-07-02 DIAGNOSIS — R00.2 PALPITATIONS: ICD-10-CM

## 2024-07-02 LAB
ATRIAL RATE: 43 BPM
P AXIS: 66 DEGREES
PR INTERVAL: 144 MS
QRS AXIS: 10 DEGREES
QRSD INTERVAL: 68 MS
QT INTERVAL: 490 MS
QTC INTERVAL: 414 MS
T WAVE AXIS: 23 DEGREES
VENTRICULAR RATE: 43 BPM

## 2024-07-02 PROCEDURE — 93005 ELECTROCARDIOGRAM TRACING: CPT

## 2024-07-02 PROCEDURE — 93010 ELECTROCARDIOGRAM REPORT: CPT | Performed by: INTERNAL MEDICINE

## 2024-07-02 PROCEDURE — 93226 XTRNL ECG REC<48 HR SCAN A/R: CPT

## 2024-07-02 PROCEDURE — 93225 XTRNL ECG REC<48 HRS REC: CPT

## 2024-07-10 DIAGNOSIS — I48.0 PAROXYSMAL A-FIB (HCC): ICD-10-CM

## 2024-07-10 DIAGNOSIS — R00.1 BRADYCARDIA: ICD-10-CM

## 2024-07-10 DIAGNOSIS — I48.91 NEW ONSET A-FIB (HCC): Primary | ICD-10-CM

## 2024-07-10 PROCEDURE — 93227 XTRNL ECG REC<48 HR R&I: CPT | Performed by: INTERNAL MEDICINE

## 2024-07-11 ENCOUNTER — TELEPHONE (OUTPATIENT)
Dept: FAMILY MEDICINE CLINIC | Facility: CLINIC | Age: 79
End: 2024-07-11

## 2024-07-11 NOTE — TELEPHONE ENCOUNTER
Attempted to reach patient and left detailed VM informing patient to call back- Please relay message and schedule if patient calls back.

## 2024-07-11 NOTE — TELEPHONE ENCOUNTER
A. Relayed results to (patient/patient representative as listed on communication consent form) as per provider message. Patient/Patient Representative expressed understanding and did not have any further questions.                                                              B. If addending telephone encounter created by office, sign and close. If no existing encounter, Route to OFFICE CLINICAL POOL with request to: Please complete Result Management task.   Pt scheduled for tomorrow 10am. Gave central scheduling number for cardiology

## 2024-07-11 NOTE — TELEPHONE ENCOUNTER
----- Message from María Stinson DO sent at 7/10/2024 11:33 PM EDT -----  Please advise pt that her Holter was read today and shows that she was intermittently in a serious abnormal heart rhythm, which needs to be treated.  I placed order for urgent cardiology consult and also please have pt come in tomorrow (Thursday 7/11) for an #overbook same day appt at either 1:30PM or 2:15PM so that we can discuss diagnosis and start treatment

## 2024-07-12 ENCOUNTER — APPOINTMENT (OUTPATIENT)
Dept: LAB | Facility: CLINIC | Age: 79
End: 2024-07-12
Payer: COMMERCIAL

## 2024-07-12 ENCOUNTER — OFFICE VISIT (OUTPATIENT)
Dept: FAMILY MEDICINE CLINIC | Facility: CLINIC | Age: 79
End: 2024-07-12
Payer: COMMERCIAL

## 2024-07-12 VITALS
HEART RATE: 47 BPM | DIASTOLIC BLOOD PRESSURE: 60 MMHG | BODY MASS INDEX: 21 KG/M2 | SYSTOLIC BLOOD PRESSURE: 100 MMHG | WEIGHT: 123 LBS | TEMPERATURE: 97.2 F | OXYGEN SATURATION: 98 % | HEIGHT: 64 IN

## 2024-07-12 DIAGNOSIS — I10 BENIGN ESSENTIAL HYPERTENSION: ICD-10-CM

## 2024-07-12 DIAGNOSIS — I48.91 NEW ONSET A-FIB (HCC): Primary | ICD-10-CM

## 2024-07-12 DIAGNOSIS — Z13.220 LIPID SCREENING: ICD-10-CM

## 2024-07-12 DIAGNOSIS — I48.0 PAROXYSMAL A-FIB (HCC): ICD-10-CM

## 2024-07-12 DIAGNOSIS — L60.8 NAIL DEFORMITY: ICD-10-CM

## 2024-07-12 DIAGNOSIS — N18.9 CHRONIC KIDNEY DISEASE, UNSPECIFIED CKD STAGE: ICD-10-CM

## 2024-07-12 DIAGNOSIS — L30.9 HAND DERMATITIS: ICD-10-CM

## 2024-07-12 DIAGNOSIS — R00.1 BRADYCARDIA: ICD-10-CM

## 2024-07-12 DIAGNOSIS — K21.9 GASTROESOPHAGEAL REFLUX DISEASE, UNSPECIFIED WHETHER ESOPHAGITIS PRESENT: ICD-10-CM

## 2024-07-12 DIAGNOSIS — Z13.29 THYROID DISORDER SCREEN: ICD-10-CM

## 2024-07-12 DIAGNOSIS — D64.9 ANEMIA, UNSPECIFIED TYPE: ICD-10-CM

## 2024-07-12 DIAGNOSIS — R55 SYNCOPE, UNSPECIFIED SYNCOPE TYPE: ICD-10-CM

## 2024-07-12 DIAGNOSIS — Z13.21 ENCOUNTER FOR VITAMIN DEFICIENCY SCREENING: ICD-10-CM

## 2024-07-12 DIAGNOSIS — M81.0 AGE RELATED OSTEOPOROSIS, UNSPECIFIED PATHOLOGICAL FRACTURE PRESENCE: ICD-10-CM

## 2024-07-12 LAB
25(OH)D3 SERPL-MCNC: 58.9 NG/ML (ref 30–100)
ALBUMIN SERPL BCG-MCNC: 3.9 G/DL (ref 3.5–5)
ALP SERPL-CCNC: 116 U/L (ref 34–104)
ALT SERPL W P-5'-P-CCNC: 19 U/L (ref 7–52)
ANION GAP SERPL CALCULATED.3IONS-SCNC: 11 MMOL/L (ref 4–13)
AST SERPL W P-5'-P-CCNC: 19 U/L (ref 13–39)
BASOPHILS # BLD AUTO: 0.05 THOUSANDS/ÂΜL (ref 0–0.1)
BASOPHILS NFR BLD AUTO: 1 % (ref 0–1)
BILIRUB SERPL-MCNC: 0.51 MG/DL (ref 0.2–1)
BUN SERPL-MCNC: 14 MG/DL (ref 5–25)
CALCIUM SERPL-MCNC: 9.3 MG/DL (ref 8.4–10.2)
CHLORIDE SERPL-SCNC: 103 MMOL/L (ref 96–108)
CO2 SERPL-SCNC: 23 MMOL/L (ref 21–32)
CREAT SERPL-MCNC: 1.06 MG/DL (ref 0.6–1.3)
CREAT UR-MCNC: 78.3 MG/DL
EOSINOPHIL # BLD AUTO: 0.07 THOUSAND/ÂΜL (ref 0–0.61)
EOSINOPHIL NFR BLD AUTO: 1 % (ref 0–6)
ERYTHROCYTE [DISTWIDTH] IN BLOOD BY AUTOMATED COUNT: 11.9 % (ref 11.6–15.1)
FERRITIN SERPL-MCNC: 156 NG/ML (ref 11–307)
GFR SERPL CREATININE-BSD FRML MDRD: 50 ML/MIN/1.73SQ M
GLUCOSE SERPL-MCNC: 100 MG/DL (ref 65–140)
HCT VFR BLD AUTO: 39.2 % (ref 34.8–46.1)
HGB BLD-MCNC: 12.7 G/DL (ref 11.5–15.4)
IMM GRANULOCYTES # BLD AUTO: 0.03 THOUSAND/UL (ref 0–0.2)
IMM GRANULOCYTES NFR BLD AUTO: 1 % (ref 0–2)
IRON SATN MFR SERPL: 48 % (ref 15–50)
IRON SERPL-MCNC: 120 UG/DL (ref 50–212)
LYMPHOCYTES # BLD AUTO: 1.35 THOUSANDS/ÂΜL (ref 0.6–4.47)
LYMPHOCYTES NFR BLD AUTO: 22 % (ref 14–44)
MAGNESIUM SERPL-MCNC: 2.7 MG/DL (ref 1.9–2.7)
MCH RBC QN AUTO: 32.2 PG (ref 26.8–34.3)
MCHC RBC AUTO-ENTMCNC: 32.4 G/DL (ref 31.4–37.4)
MCV RBC AUTO: 100 FL (ref 82–98)
MICROALBUMIN UR-MCNC: <7 MG/L
MONOCYTES # BLD AUTO: 0.48 THOUSAND/ÂΜL (ref 0.17–1.22)
MONOCYTES NFR BLD AUTO: 8 % (ref 4–12)
NEUTROPHILS # BLD AUTO: 4.06 THOUSANDS/ÂΜL (ref 1.85–7.62)
NEUTS SEG NFR BLD AUTO: 67 % (ref 43–75)
NRBC BLD AUTO-RTO: 0 /100 WBCS
PHOSPHATE SERPL-MCNC: 3.2 MG/DL (ref 2.3–4.1)
PLATELET # BLD AUTO: 301 THOUSANDS/UL (ref 149–390)
PMV BLD AUTO: 11.4 FL (ref 8.9–12.7)
POTASSIUM SERPL-SCNC: 4.6 MMOL/L (ref 3.5–5.3)
PROT SERPL-MCNC: 6.7 G/DL (ref 6.4–8.4)
RBC # BLD AUTO: 3.94 MILLION/UL (ref 3.81–5.12)
SODIUM SERPL-SCNC: 137 MMOL/L (ref 135–147)
TIBC SERPL-MCNC: 252 UG/DL (ref 250–450)
TSH SERPL DL<=0.05 MIU/L-ACNC: 1.58 UIU/ML (ref 0.45–4.5)
UIBC SERPL-MCNC: 132 UG/DL (ref 155–355)
VIT B12 SERPL-MCNC: 475 PG/ML (ref 180–914)
WBC # BLD AUTO: 6.04 THOUSAND/UL (ref 4.31–10.16)

## 2024-07-12 PROCEDURE — 84100 ASSAY OF PHOSPHORUS: CPT

## 2024-07-12 PROCEDURE — 84443 ASSAY THYROID STIM HORMONE: CPT

## 2024-07-12 PROCEDURE — 83550 IRON BINDING TEST: CPT

## 2024-07-12 PROCEDURE — 83735 ASSAY OF MAGNESIUM: CPT

## 2024-07-12 PROCEDURE — 85025 COMPLETE CBC W/AUTO DIFF WBC: CPT

## 2024-07-12 PROCEDURE — 82728 ASSAY OF FERRITIN: CPT

## 2024-07-12 PROCEDURE — 83540 ASSAY OF IRON: CPT

## 2024-07-12 PROCEDURE — 36415 COLL VENOUS BLD VENIPUNCTURE: CPT

## 2024-07-12 PROCEDURE — 99214 OFFICE O/P EST MOD 30 MIN: CPT | Performed by: FAMILY MEDICINE

## 2024-07-12 PROCEDURE — 82607 VITAMIN B-12: CPT

## 2024-07-12 PROCEDURE — 80053 COMPREHEN METABOLIC PANEL: CPT

## 2024-07-12 PROCEDURE — 82306 VITAMIN D 25 HYDROXY: CPT

## 2024-07-12 PROCEDURE — 82570 ASSAY OF URINE CREATININE: CPT

## 2024-07-12 PROCEDURE — 82043 UR ALBUMIN QUANTITATIVE: CPT

## 2024-07-12 RX ORDER — OMEPRAZOLE 20 MG/1
20 CAPSULE, DELAYED RELEASE ORAL DAILY
Qty: 90 CAPSULE | Refills: 0 | Status: SHIPPED | OUTPATIENT
Start: 2024-07-12

## 2024-07-12 NOTE — PROGRESS NOTES
"Assessment/Plan:      Diagnoses and all orders for this visit:    New onset a-fib (HCC)  -     Echo complete w/ contrast if indicated; Future    Paroxysmal A-fib (HCC)  -     apixaban (ELIQUIS) 5 mg; Take 1 tablet (5 mg total) by mouth 2 (two) times a day  -     Echo complete w/ contrast if indicated; Future    Bradycardia  -     Echo complete w/ contrast if indicated; Future    Syncope, unspecified syncope type  -     Echo complete w/ contrast if indicated; Future    Nail deformity  -     Ambulatory Referral to Dermatology; Future    Hand dermatitis  -     Ambulatory Referral to Dermatology; Future    Gastroesophageal reflux disease, unspecified whether esophagitis present  -     omeprazole (PriLOSEC) 20 mg delayed release capsule; Take 1 capsule (20 mg total) by mouth daily      Pt has normal/regular rhythm on exam today with bradycardic HR; on Holter she had PAFib with RVR nearly 30% of the monitored time - I am starting Eliquis; for now not initiating any blocker due to her baseline bradycardia  Cardiology consult was already ordered - pt admits today that someone from [facility] called yesterday - I advise pt to call central scheduling number to set up appt ASAP    Subjective:  Chief Complaint   Patient presents with    Results     Discuss results; irregular heart rhythm         Patient ID: Margarito Nieves is a 78 y.o. female.    Visit scheduled per phone message 7/11/24 for new onset Paroxysmal Afib detected on Holter that was ordered/obtained for palpitations c/o  Late start to today's visit due to pt appt just before took much longer than scheduled  Pt admits still getting palpitations intermittently, denies any CP, admits \"maybe a little short of breath now and then\"  Pt has had syncopal episodes on and off for \"couple of years\"- often resulting in severe injuries, including pt reporting one episode of syncope when she was in the shower and fractured both femurs  Pt is overdue for labs- orders were placed " last month at the visit here for palpitations complaint - prior to that visit, pt had not been seen here for over a year  EKG just showed sinus bradycardia  Last Echo was 2020        48 hour Holter monitor report  INDICATIONS: Palpitations  DESCRIPTION OF FINDINGS:  The patient was monitored for a total of 48 hours.  The patient was predominantly in sinus rhythm throughout the study.  The average heart rate was 73 beats per minute.  The heart rate ranged from a low of 37 beats per minute in sinus bradycardia at 12:55 PM to a maximum of 176 beats per minute in atrial fibrillation rapid ventricular response at 7:09 AM.  Ventricular ectopic activity consisted of 8 beats (0.0% of total beats), of which, 7 were single PVCs, 1 interpolated PVC, 0 were ventricular couplets, 0 were in bigeminy and 0 were in trigeminy. There was no sustained or nonsustained ventricular tachycardia.  Supraventricular ectopic activity consisted of 542 beats (0.3% of total beats), of which, 437 were single PACs, 4 late beats, 33 were atrial couplets, 14 were in bigeminy and 3 were in trigeminy. There were 5 atrial runs, longest of which was 6 beats.   The patient appeared to be in paroxysmal atrial fibrillation approximately 28.5% of the monitored timeframe.  There were no significant pauses. The longest R-R interval was 1.8 seconds.  There was no evidence of advanced degree heart block.  The accompanying patient diary notes symptoms of palpitations. Correlation with the tracings at these times reveals sinus bradycardia heart rate 50 bpm.  IMPRESSION:  1. Predominantly sinus rhythm, with an average heart rate of 73 beats per minute  2. Paroxysmal atrial fibrillation was appreciated during the monitored timeframe.  3. There was rare ventricular ectopic activity with no sustained or nonsustained ventricular tachycardia present.  4. No significant pauses or advanced degree heart block  5. The accompanying patient diary notes symptoms of  palpitations. Correlation with the tracings at these times reveals sinus bradycardia heart rate 50 bpm.  Exam Ended: 07/02/24 12:48 PM Last Resulted: 07/10/24 12:37 PM             All Conversations: Results  (Oldest Message First)July 11, 2024  Theresa ZARCO    7/11/24  8:15 AM  Note      ----- Message from María Stinson DO sent at 7/10/2024 11:33 PM EDT -----  Please advise pt that her Holter was read today and shows that she was intermittently in a serious abnormal heart rhythm, which needs to be treated.  I placed order for urgent cardiology consult and also please have pt come in tomorrow (Thursday 7/11) for an #overbook same day appt at either 1:30PM or 2:15PM so that we can discuss diagnosis and start treatment   Theresa ZARCO    7/11/24  8:15 AM  Note      Attempted to reach patient and left detailed VM informing patient to call back- Please relay message and schedule if patient calls back.    7/11/24  3:52 PM  Margarito Nieves [facility]    7/11/24  3:52 PM  Note      A. Relayed results to (patient/patient representative as listed on communication consent form) as per provider message. Patient/Patient Representative expressed understanding and did not have any further questions.                                                            B. If addending telephone encounter created by office, sign and close. If no existing encounter, Route to OFFICE CLINICAL POOL with request to: Please complete Result Management task.   Pt scheduled for tomorrow 10am. Gave central scheduling number for cardiology                         02/16/2020   ECHO 2D COMPLETE   ECHO REPORT   MARGARITO NIEVES   Clinical Indications:   heart murmur, elevated troponin   Technical Quality:   ICD Codes:   Rhythm:   Technical Notes:   CONCLUSIONS   Normal left ventricular chamber size.   Normal left ventricular systolic function.   Normal right ventricular size and function.   Mild tricuspid  regurgitation.   Visually Estimated LV Ejection Fraction is:60%            Review of Systems   Constitutional: Negative.    Respiratory: Negative.     Cardiovascular:  Negative for chest pain and leg swelling.   Gastrointestinal: Negative.    Neurological:  Negative for tremors, seizures, facial asymmetry, speech difficulty, weakness, numbness and headaches.   Hematological: Negative.          Objective:     Physical Exam  Vitals and nursing note reviewed.   Constitutional:       General: She is not in acute distress.     Appearance: She is well-developed and well-groomed. She is not ill-appearing, toxic-appearing or diaphoretic.   HENT:      Head: Normocephalic and atraumatic.      Mouth/Throat:      Pharynx: Uvula midline.   Neck:      Thyroid: No thyroid mass, thyromegaly or thyroid tenderness.      Vascular: No JVD.      Trachea: Trachea and phonation normal.   Cardiovascular:      Rate and Rhythm: Normal rate and regular rhythm.      Pulses: Normal pulses.      Heart sounds: Normal heart sounds.   Pulmonary:      Effort: Pulmonary effort is normal.      Breath sounds: Normal breath sounds and air entry.   Abdominal:      Palpations: Abdomen is soft.      Tenderness: There is no abdominal tenderness.   Musculoskeletal:      Cervical back: Neck supple.      Right lower leg: No edema.      Left lower leg: No edema.   Lymphadenopathy:      Cervical: No cervical adenopathy.   Skin:     General: Skin is warm and dry.      Capillary Refill: Capillary refill takes less than 2 seconds.      Coloration: Skin is not pale.      Comments: B/l hand dermatitis and nail changes   Neurological:      General: No focal deficit present.      Mental Status: She is alert and oriented to person, place, and time.      Gait: Gait normal.   Psychiatric:         Mood and Affect: Mood is anxious.         Behavior: Behavior normal. Behavior is cooperative.         Cognition and Memory: Cognition normal.

## 2024-07-17 ENCOUNTER — TELEPHONE (OUTPATIENT)
Age: 79
End: 2024-07-17

## 2024-07-17 ENCOUNTER — HOSPITAL ENCOUNTER (OUTPATIENT)
Dept: RADIOLOGY | Facility: IMAGING CENTER | Age: 79
Discharge: HOME | End: 2024-07-17
Payer: COMMERCIAL

## 2024-07-17 VITALS — WEIGHT: 123 LBS | HEIGHT: 64 IN | BODY MASS INDEX: 21 KG/M2

## 2024-07-17 DIAGNOSIS — Z12.31 ENCOUNTER FOR SCREENING MAMMOGRAM FOR BREAST CANCER: ICD-10-CM

## 2024-07-17 PROCEDURE — 77067 SCR MAMMO BI INCL CAD: CPT

## 2024-07-17 PROCEDURE — 77063 BREAST TOMOSYNTHESIS BI: CPT

## 2024-07-17 NOTE — TELEPHONE ENCOUNTER
Call placed to patient, I spoke with both her and  mimi regarding records. According to mimi records are being sent already.

## 2024-07-29 ENCOUNTER — TELEPHONE (OUTPATIENT)
Age: 79
End: 2024-07-29

## 2024-07-29 NOTE — TELEPHONE ENCOUNTER
Pt's  called to check on update of med refill request. Advised him of previous message that refill will have to wait until provider is back in the office 7/24.

## 2024-07-29 NOTE — TELEPHONE ENCOUNTER
Patient called the RX Refill Line. Message is being forwarded to the office.     Patient is requesting doxazosin (CARDURA) 2 mg tablet - take 1 tablet by mouth once daily  (not on active med list). Patient stated that while at her visit she was told the above medication would be prescribed and sent to [id number] - [place], [place] -[address] [place] [id number]. Please review.    Please contact patient at  [phone].

## 2024-07-29 NOTE — TELEPHONE ENCOUNTER
I do not see any mention of this in any of the previous 2 office visits.  It looks like it was discontinued on June 24.  Refill will have to wait until PCP returns tomorrow.

## 2024-07-30 ENCOUNTER — TELEPHONE (OUTPATIENT)
Age: 79
End: 2024-07-30

## 2024-07-30 NOTE — TELEPHONE ENCOUNTER
Please see telephone note 6/21/24 regarding doxazosin    Also, I don't see that pt has scheduled appt with cardiology yet for her new onset A Fib. At last visit, I advised pt to call central scheduling number to set up appt ASAP.  Please call pt and help facilitate this cardiology appt if she needs assistance scheduling

## 2024-07-30 NOTE — TELEPHONE ENCOUNTER
Pt called back, relayed message to pt as per provider. Pt admitted that she has not called Cardiology, recited the number for Central Scheduling and said she would call as soon as she hangs up - but continued to ask about the doxazosin.    Advised pt that I relayed message as written by the provider.    Warm transferred to [facility] with [facility] Nurse triage to assist pt further with clinical questions.

## 2024-07-30 NOTE — TELEPHONE ENCOUNTER
Pt called in stating the number that was provided to call and schedule with Cardiologist was the wrong number. I [facility] the patient [facility] [phone] to schedule an appt. No further questions or concerns.

## 2024-07-30 NOTE — TELEPHONE ENCOUNTER
"Hello,    The following message was sent via e-mail to the leadership team:     Please advise if you can help facilitate the following overbook request:    Patient Name: Margarito Nieves    Patient MRN: [id number]    Call back #: [phone]    Insurance: [employer]    Department:Cardiology    Speciality: General Cardiology    Reason for overbook request: PROVIDER REQUEST    Comments (Write \"N/a\" if no comments): Patient was told by Dr Stinson to contact us to schedule an appointment based off of stat referral. The referral was dated from 7/10/2024.     Requested doctor and location: Prefers [place] or [place]    Date of current appointment: 9/25/2024 @1:40pm with Dr Miranda      Thank you.    "

## 2024-07-30 NOTE — TELEPHONE ENCOUNTER
Rcvd call from patient who said that she doesn't understand why doxazosin has not been called in for her. Reviewed the notes and told her that on her last visit with Dr. Stinson, she explained that she was not going to order that medication until she was seen by cardiology. An appt was scheduled for her on 7/11 but she did not go to it. Patient states she did not know about the appointment. Explained the importance of following up with the cardiologist. Advised her to call cardiology now for an appt and explain why she missed her 7/11 appointment. Patient stated understanding and said she will cardiology right now. Asked patient if she has taken her BP at home and she said no. Attempted to transfer patient to the office for further explanation but she declined, did not want to speak to anyone else. Verified that patient has the number for cardiology. States she will call the right now.  Please follow up with patient, did not seem to understand instructions from previous appointments.

## 2024-08-08 ENCOUNTER — HOSPITAL ENCOUNTER (OUTPATIENT)
Dept: NON INVASIVE DIAGNOSTICS | Facility: HOSPITAL | Age: 79
Discharge: HOME | End: 2024-08-08
Payer: COMMERCIAL

## 2024-08-08 VITALS
WEIGHT: 123 LBS | HEIGHT: 64 IN | SYSTOLIC BLOOD PRESSURE: 100 MMHG | BODY MASS INDEX: 21 KG/M2 | HEART RATE: 53 BPM | DIASTOLIC BLOOD PRESSURE: 60 MMHG

## 2024-08-08 DIAGNOSIS — R55 SYNCOPE, UNSPECIFIED SYNCOPE TYPE: ICD-10-CM

## 2024-08-08 DIAGNOSIS — I48.91 NEW ONSET A-FIB (HCC): ICD-10-CM

## 2024-08-08 DIAGNOSIS — R00.1 BRADYCARDIA: ICD-10-CM

## 2024-08-08 DIAGNOSIS — I48.0 PAROXYSMAL A-FIB (HCC): ICD-10-CM

## 2024-08-08 LAB
AORTIC ROOT: 2.6 CM
APICAL FOUR CHAMBER EJECTION FRACTION: 70 %
ASCENDING AORTA: 2.3 CM
BSA FOR ECHO PROCEDURE: 1.59 M2
DOP CALC MV VTI: 34.26 CM
E WAVE DECELERATION TIME: 282 MS
E/A RATIO: 0.77
FRACTIONAL SHORTENING: 31 (ref 28–44)
INTERVENTRICULAR SEPTUM IN DIASTOLE (PARASTERNAL SHORT AXIS VIEW): 1 CM
INTERVENTRICULAR SEPTUM: 1 CM (ref 0.6–1.1)
LAAS-AP2: 20.6 CM2
LAAS-AP4: 14.4 CM2
LEFT ATRIUM SIZE: 3.3 CM
LEFT ATRIUM VOLUME (MOD BIPLANE): 54 ML
LEFT ATRIUM VOLUME INDEX (MOD BIPLANE): 34 ML/M2
LEFT INTERNAL DIMENSION IN SYSTOLE: 2.4 CM (ref 2.1–4)
LEFT VENTRICULAR INTERNAL DIMENSION IN DIASTOLE: 3.5 CM (ref 3.5–6)
LEFT VENTRICULAR POSTERIOR WALL IN END DIASTOLE: 0.9 CM
LEFT VENTRICULAR STROKE VOLUME: 30 ML
LVSV (TEICH): 30 ML
MV E'TISSUE VEL-SEP: 10 CM/S
MV MEAN GRADIENT: 2 MMHG
MV PEAK A VEL: 1.05 M/S
MV PEAK E VEL: 81 CM/S
MV PEAK GRADIENT: 4 MMHG
MV STENOSIS PRESSURE HALF TIME: 82 MS
MV VALVE AREA P 1/2 METHOD: 2.68
RIGHT ATRIUM AREA SYSTOLE A4C: 9.7 CM2
RIGHT VENTRICLE ID DIMENSION: 3.2 CM
SL CV LEFT ATRIUM LENGTH A2C: 4.9 CM
SL CV LV EF: 65
SL CV PED ECHO LEFT VENTRICLE DIASTOLIC VOLUME (MOD BIPLANE) 2D: 51 ML
SL CV PED ECHO LEFT VENTRICLE SYSTOLIC VOLUME (MOD BIPLANE) 2D: 21 ML
TR MAX PG: 20 MMHG
TR PEAK VELOCITY: 2.2 M/S
TRICUSPID ANNULAR PLANE SYSTOLIC EXCURSION: 2.2 CM
TRICUSPID VALVE PEAK REGURGITATION VELOCITY: 2.23 M/S

## 2024-08-08 PROCEDURE — 93306 TTE W/DOPPLER COMPLETE: CPT | Performed by: INTERNAL MEDICINE

## 2024-08-08 PROCEDURE — 93306 TTE W/DOPPLER COMPLETE: CPT

## 2024-08-15 ENCOUNTER — OFFICE VISIT (OUTPATIENT)
Dept: FAMILY MEDICINE CLINIC | Facility: CLINIC | Age: 79
End: 2024-08-15
Payer: COMMERCIAL

## 2024-08-15 VITALS
OXYGEN SATURATION: 98 % | HEIGHT: 64 IN | SYSTOLIC BLOOD PRESSURE: 138 MMHG | DIASTOLIC BLOOD PRESSURE: 70 MMHG | BODY MASS INDEX: 20.66 KG/M2 | TEMPERATURE: 97.3 F | WEIGHT: 121 LBS | HEART RATE: 64 BPM

## 2024-08-15 DIAGNOSIS — R31.0 GROSS HEMATURIA: ICD-10-CM

## 2024-08-15 DIAGNOSIS — R82.90 ABNORMAL URINALYSIS: ICD-10-CM

## 2024-08-15 DIAGNOSIS — Z13.31 POSITIVE DEPRESSION SCREENING: ICD-10-CM

## 2024-08-15 DIAGNOSIS — R30.0 DYSURIA: Primary | ICD-10-CM

## 2024-08-15 LAB
SL AMB  POCT GLUCOSE, UA: NEGATIVE
SL AMB LEUKOCYTE ESTERASE,UA: NEGATIVE
SL AMB POCT BILIRUBIN,UA: ABNORMAL
SL AMB POCT BLOOD,UA: ABNORMAL
SL AMB POCT CLARITY,UA: CLEAR
SL AMB POCT COLOR,UA: YELLOW
SL AMB POCT KETONES,UA: 0.5
SL AMB POCT NITRITE,UA: NEGATIVE
SL AMB POCT PH,UA: 5
SL AMB POCT SPECIFIC GRAVITY,UA: 1.03
SL AMB POCT URINE PROTEIN: ABNORMAL
SL AMB POCT UROBILINOGEN: NEGATIVE

## 2024-08-15 PROCEDURE — 81002 URINALYSIS NONAUTO W/O SCOPE: CPT | Performed by: FAMILY MEDICINE

## 2024-08-15 PROCEDURE — 99214 OFFICE O/P EST MOD 30 MIN: CPT | Performed by: FAMILY MEDICINE

## 2024-08-15 RX ORDER — CEPHALEXIN 500 MG/1
500 CAPSULE ORAL EVERY 12 HOURS SCHEDULED
Qty: 14 CAPSULE | Refills: 0 | Status: SHIPPED | OUTPATIENT
Start: 2024-08-15 | End: 2024-08-22

## 2024-08-15 NOTE — PROGRESS NOTES
"Assessment/Plan:      Diagnoses and all orders for this visit:    Dysuria  -     POCT urine dip  -     Urine culture; Future  -     cephalexin (KEFLEX) 500 mg capsule; Take 1 capsule (500 mg total) by mouth every 12 (twelve) hours for 7 days    Gross hematuria  -     Urine culture; Future  -     cephalexin (KEFLEX) 500 mg capsule; Take 1 capsule (500 mg total) by mouth every 12 (twelve) hours for 7 days    Abnormal urinalysis  -     Urine culture; Future  -     cephalexin (KEFLEX) 500 mg capsule; Take 1 capsule (500 mg total) by mouth every 12 (twelve) hours for 7 days    Positive depression screening          Subjective:  Chief Complaint   Patient presents with    Possible UTI     Blood in urine, only can urinate a little, pain when trying to go, started Yesterday        Patient ID: Margarito Nieves is a 78 y.o. female.    Same day sick appt  Sx onset actually 2 days ago, worse yesterday, and then today \"just a drop with blood every time and very painful every time\" she would try to go  Also states she read about Eliquis \"and had pinpoint blood dots all over my calves yesterday and the day before - gone now\"  States feelings of depression are mainly related to pain of arthritis - assessed not clinical depression        Review of Systems      Objective:     Physical Exam  Vitals and nursing note reviewed.   Constitutional:       General: She is not in acute distress.     Appearance: She is well-developed and well-groomed. She is not ill-appearing, toxic-appearing or diaphoretic.   HENT:      Head: Normocephalic and atraumatic.      Mouth/Throat:      Lips: Pink.      Pharynx: Oropharynx is clear. Uvula midline.   Neck:      Trachea: Phonation normal.   Pulmonary:      Effort: Pulmonary effort is normal.   Abdominal:      General: There is no distension.      Palpations: Abdomen is soft. There is no hepatomegaly, splenomegaly or mass.      Tenderness: There is abdominal tenderness in the suprapubic area. There is no " right CVA tenderness, left CVA tenderness, guarding or rebound.      Hernia: There is no hernia in the ventral area.   Skin:     General: Skin is warm and dry.      Coloration: Skin is not pale.   Neurological:      Mental Status: She is alert and oriented to person, place, and time.   Psychiatric:         Mood and Affect: Mood normal.         Behavior: Behavior is cooperative.         Cognition and Memory: Cognition and memory normal.           Depression Screening Follow-up Plan: Patient's depression screening was positive with a PHQ-2 score of 4. Their PHQ-9 score was 10. Clinically patient does not have depression. No treatment is required.  Depression Screening Follow-up Plan: Patient's depression screening was positive with a PHQ-2 score of 4. Their PHQ-9 score was 10. Clinically patient does not have depression. No treatment is required.  Depression Screening and Follow-up Plan: Patient's depression screening was positive with a PHQ-2 score of 4. Their PHQ-9 score was 10. Clincally patient does not have depression. No treatment is required.

## 2024-09-25 ENCOUNTER — CONSULT (OUTPATIENT)
Dept: CARDIOLOGY CLINIC | Facility: CLINIC | Age: 79
End: 2024-09-25
Payer: COMMERCIAL

## 2024-09-25 VITALS
HEART RATE: 56 BPM | WEIGHT: 124 LBS | HEIGHT: 64 IN | SYSTOLIC BLOOD PRESSURE: 142 MMHG | BODY MASS INDEX: 21.17 KG/M2 | DIASTOLIC BLOOD PRESSURE: 70 MMHG

## 2024-09-25 DIAGNOSIS — R00.1 BRADYCARDIA: ICD-10-CM

## 2024-09-25 DIAGNOSIS — I48.91 NEW ONSET A-FIB (HCC): ICD-10-CM

## 2024-09-25 DIAGNOSIS — I48.0 PAROXYSMAL A-FIB (HCC): ICD-10-CM

## 2024-09-25 PROCEDURE — 99204 OFFICE O/P NEW MOD 45 MIN: CPT | Performed by: INTERNAL MEDICINE

## 2024-09-25 RX ORDER — DILTIAZEM HYDROCHLORIDE 120 MG/1
120 CAPSULE, EXTENDED RELEASE ORAL DAILY
Qty: 30 CAPSULE | Refills: 5 | Status: SHIPPED | OUTPATIENT
Start: 2024-09-25

## 2024-09-25 NOTE — PROGRESS NOTES
Patient ID: Margarito Nieves is a 78 y.o. female.        Plan:      Paroxysmal A-fib (HCC)  [id number] score at least 3    Concerned regarding her PMX (falls, concussion, fractures) and ETOH intake reported as this can increase risk of nocturnal hypoxia/YOSVANY/Afib (most all of her Afib was seen between midnite and 8 am on the monitor).  STRONGLY advised moderation of alcohol intake.    For now agree with full DOAC AC, gave her info to ask pharmacist for cost of Pradaxa as Eliquis is costly.    She has a degree of SSS, bradycardia when in SR which is why a BB is relatively contraindicated here.  Will try Diltiazem as this shouldn't affect her sinus rate as much.  120 mg daily ordered, Holter will be repeated in 2 weeks.    Home study for Yosvany ordered.    May need  to consider EPS consultation for ablation +/- PPM if sx persist and cannot control RVR without resulting bradycardia.       Follow up Plan/Other summary comments:  Return in about 4 weeks (around 10/23/2024).    HPI: Margarito is a 77 yo wf here for cardiac opinion in light of PAF (nealry 30% burden) to HR nearly 180 bpm seen on recent Holter.  She has been having the same palpitations on and off for about a year.  Looking at a cardio note from 2016 perhaps they began then.  No known prior MI/CAD, recent TFTs normal, recent echo showed normal LVEF and normal wall motion without signif VHD.  No h/o CVA/TIA, CHF, DM.  Was on several antihypertensives.  Does snore heavy per her partner here today who also tattled on her ab bit telling me she drinks 6 liters of [place](sp?) wine a week.  She is an ex smoker.  Denies LOC.    EKG  Marked sinus bradycardia  Otherwise normal ECG  When compared with ECG of 21-APR-2023 15:00,  No significant change was found  Confirmed by Kishor Perez ([id number]) on 7/2/2024 5:04:17 PM    Most recent or relevant cardiac/vascular testing:      Left Ventricle: Left ventricular cavity size is normal. Wall thickness is normal. The left  "ventricular ejection fraction is 65%. Systolic function is normal. Wall motion is normal.    Right Ventricle: Systolic function is normal. Normal tricuspid annular plane systolic excursion (TAPSE) > 1.7 cm.    Left Atrium: The atrium is mildly dilated.    Mitral Valve: There is mild annular calcification.    Tricuspid Valve: There is mild regurgitation.    Pericardium: There is no pericardial effusion.    IMPRESSION:  Predominantly sinus rhythm, with an average heart rate of 73 beats per minute  Paroxysmal atrial fibrillation was appreciated during the monitored timeframe.  There was rare ventricular ectopic activity with no sustained or nonsustained ventricular tachycardia present.  No significant pauses or advanced degree heart block  The accompanying patient diary notes symptoms of palpitations. Correlation with the tracings at these times reveals sinus bradycardia heart rate 50 bpm.         Past Surgical History:   Procedure Laterality Date    APPENDECTOMY      CATARACT EXTRACTION      LAPAROSCOPIC APPENDECTOMY      incidental     [place] TENDON SHEATH INCISION Left 2/26/2019    Procedure: LONG FINGER TRIGGER FINGER RELEASE;  Surgeon: Ash Louis MD;  Location: [facility];  Service: Orthopedics    TONSILLECTOMY      TUBAL LIGATION         Lipid Profile: Reviewed      Review of Systems   10  point ROS  was otherwise non pertinent or negative except as per HPI or as below.       Objective:     /70   Pulse 56   Ht 5' 4\" (1.626 m)   Wt 56.2 kg (124 lb)   BMI 21.28 kg/m²     PHYSICAL EXAM:    General:  Normal appearance in no distress.  Eyes:  Anicteric.  Oral mucosa:  Moist.  Neck:  No JVD. Carotid upstrokes are brisk without bruits.  No masses.  Chest:  Clear to auscultation.  Cardiac:  No palpable PMI.  Normal S1 and S2.  No murmur gallop or rub.  Abdomen:  Soft and nontender. No palpable organomegaly or aortic enlargement.  Extremities:  No peripheral edema.  Musculoskeletal:  Symmetric.   Vascular:  " "Femoral pulses are brisk without bruits.  Popliteal pulses are intact bilaterally.   Pedal pulses are intact.  Neuro:  Grossly symmetric.  Psych:  Alert and oriented x3.      Meds reviewed.    Current Outpatient Medications:     Acetaminophen (TYLENOL ARTHRITIS PAIN PO), Take by mouth, Disp: , Rfl:     acetaminophen (TYLENOL) 325 mg tablet, Take 650 mg by mouth every 6 (six) hours as needed for mild pain, Disp: , Rfl:     ammonium lactate (LAC-HYDRIN) 12 % lotion, Apply topically 2 (two) times a day as needed for dry skin, Disp: 225 g, Rfl: 1    apixaban (ELIQUIS) 5 mg, Take 1 tablet (5 mg total) by mouth 2 (two) times a day, Disp: 180 tablet, Rfl: 1    Cholecalciferol (VITAMIN D3) 50 MCG (2000 UT) TABS, Take 2,000 Units by mouth daily, Disp: , Rfl:     diltiazem (DILACOR XR) 120 MG 24 hr capsule, Take 1 capsule (120 mg total) by mouth daily, Disp: 30 capsule, Rfl: 5    fluticasone (FLONASE) 50 mcg/act nasal spray, into each nostril, Disp: , Rfl:     multivitamin (THERAGRAN) TABS, Take 1 tablet by mouth in the morning., Disp: , Rfl:     omeprazole (PriLOSEC) 20 mg delayed release capsule, Take 1 capsule (20 mg total) by mouth daily, Disp: 90 capsule, Rfl: 0    Tuberculin-Allergy Syringes (ALLERGY SYRINGE 1CC/27GX1/2\") 27G X 1/2\" 1 ML MISC, Use Receives allergy injections once a month through allergist, Disp: , Rfl:     vitamin B-12 (CYANOCOBALAMIN) 100 MCG TABS, Take 50 mcg by mouth daily, Disp: , Rfl:     VITAMIN D PO, Take by mouth, Disp: , Rfl:      Past Medical History:   Diagnosis Date    Cellulitis of right hand 05/10/2022    Closed fracture of right proximal humerus 02/15/2020    GERD (gastroesophageal reflux disease)     NSTEMI (non-ST elevated myocardial infarction) ([facility]) 02/16/2020    due to demand ischemia post fall/fracture    Osteopenia 10/23/2017    PAF (paroxysmal atrial fibrillation)     Syncope            Social History     Tobacco Use   Smoking Status Former    Current packs/day: 1.00    Types: " Cigarettes   Smokeless Tobacco Never   Tobacco Comments    age [age]

## 2024-09-25 NOTE — ASSESSMENT & PLAN NOTE
[id number] score at least 3    Concerned regarding her PMX (falls, concussion, fractures) and ETOH intake reported as this can increase risk of nocturnal hypoxia/YOSVANY/Afib (most all of her Afib was seen between midnite and 8 am on the monitor).  STRONGLY advised moderation of alcohol intake.    For now agree with full DOAC AC, gave her info to ask pharmacist for cost of Pradaxa as Eliquis is costly.    She has a degree of SSS, bradycardia when in SR which is why a BB is relatively contraindicated here.  Will try Diltiazem as this shouldn't affect her sinus rate as much.  120 mg daily ordered, Holter will be repeated in 2 weeks.    Home study for Yosvany ordered.    May need  to consider EPS consultation for ablation +/- PPM if sx persist and cannot control RVR without resulting bradycardia.

## 2024-09-25 NOTE — PATIENT INSTRUCTIONS
"ASK PHARMACIST IF PRADAXA 150 MG GENERIC TWICE A DAY IS MORE AFFORDABLE    Patient Education     Atrial fibrillation   The Basics   Written by the doctors and editors at [facility]   What is atrial fibrillation? -- Atrial fibrillation is the most common heart rhythm problem (figure 1). The condition can put you at risk of stroke and other problems, as well as death. Atrial fibrillation is sometimes called \"A-fib.\"  The top 2 chambers of your heart are called the \"atria.\" They pump blood into the larger bottom chambers, which pump blood to your lungs and the rest of your body. In A-fib, your heart beats abnormally and the top chambers stop pumping blood as strongly as normal.  In some people, A-fib never goes away. In others, A-fib can come and go, even with treatment. If you had A-fib in the past, but have a normal heart rhythm now, ask your doctor what you can do to keep A-fib from coming back.  You might be able to lower your chances of having A-fib again if you:   Control your blood pressure   Avoid or limit alcohol   Cut down on caffeine   Get treatment for an overactive thyroid gland   Get regular exercise   Lose weight (if you are overweight)   Reduce stress  What are the symptoms of A-fib? -- Some people with A-fib have no symptoms. When symptoms do happen, they can include:   Feeling as though your heart is racing, skipping beats, or beating out of sync   Mild chest \"tightness\" or pain   Feeling lightheaded, dizzy, or like you might pass out   Having trouble breathing, especially with exercise  Is there a test for A-fib? -- Yes. If your doctor or nurse thinks that you might have A-fib, they will probably do a test called an electrocardiogram (\"ECG\"). It records the electrical activity in your heart.  How is A-fib treated? -- In some cases, A-fib goes away on its own, even without treatment. But many people do need treatment.  Treatment can include 1 or more of the following:   Medicines to control the speed or " "rhythm of the heartbeat   Medicines to keep clots from forming   \"Cardioversion\" - This involves applying an electrical current to the heart to fix its rhythm.   \"Ablation\" - These treatments involve destroying the small part of the heart that is sending abnormal electrical signals. This can be done using heat (\"radiofrequency ablation\") or cold (\"cryoablation\").   Pacemaker - This is a device that is implanted in the body and sends electrical signals to the heart to control the heartbeat.  What will my life be like? -- Most people with A-fib are able to live normal lives. Still, it is important to take the medicines that your doctor prescribes every day. Taking your medicines as directed can help reduce the chances that your A-fib will cause a stroke. It's also a good idea to learn what the signs and symptoms of a stroke are (figure 2).  When should I call the doctor? -- Call for an ambulance (in the [place] and [place], call 9-1-1) if:   You have severe trouble breathing, or you pass out.   You have signs of a heart attack, which might include:   Severe chest pain, pressure, or discomfort with:   Breathing trouble, sweating, upset stomach, or cold and clammy skin   Pain in your arms, back, or jaw   Pain that gets worse with activity like walking up stairs   You have signs of a stroke, which might include:   Numbness or weakness of the face, arm, or leg, especially on 1 side of the body   Confusion, or trouble speaking or understanding   Trouble seeing in 1 or both eyes   Trouble walking, dizziness, or loss of balance or coordination   Severe headache with no known cause  Call your doctor for advice if:   You have trouble breathing when talking or sitting still.   You feel your heart racing, and it does not stop after a while (for example, 1 hour).   You are lightheaded or more tired than normal.  All topics are updated as new evidence becomes available and our peer review process is complete.  This topic retrieved from " "[employer] on: Feb 26, 2024.  Topic 54144 Version 25.0  Release: 32.2.4 - C32.56  © 2024 UpToDate, Inc. and/or its affiliates. All rights reserved.  figure 1: Atrial fibrillation     This drawing shows where the heart is located in the chest. In atrial fibrillation (\"A-fib\"), the electrical signals that control the heartbeat are abnormal. As a result, the top 2 chambers of the heart (see arrows) stop pumping effectively, and blood that should move out of these chambers gets left behind.  Graphic 77232 Version 6.0  figure 2: BE FAST to help remember stroke symptoms     One way to help remember stroke symptoms is to think of the words \"BE FAST.\" If a person shows any of these signs, call for an ambulance right away (in the [place] and [place], call 9-1-1).  Graphic 32234 Version 10.0  Consumer Information Use and Disclaimer   Disclaimer: This generalized information is a limited summary of diagnosis, treatment, and/or medication information. It is not meant to be comprehensive and should be used as a tool to help the user understand and/or assess potential diagnostic and treatment options. It does NOT include all information about conditions, treatments, medications, side effects, or risks that may apply to a specific patient. It is not intended to be medical advice or a substitute for the medical advice, diagnosis, or treatment of a health care provider based on the health care provider's examination and assessment of a patient's specific and unique circumstances. Patients must speak with a health care provider for complete information about their health, medical questions, and treatment options, including any risks or benefits regarding use of medications. This information does not endorse any treatments or medications as safe, effective, or approved for treating a specific patient. UpToDate, Inc. and its affiliates disclaim any warranty or liability relating to this information or the use thereof.The use of this " information is governed by the Terms of Use, available at https://www.wolterskluwer.com/en/know/clinical-effectiveness-terms. 2024© [employer] and its affiliates and/or licensors. All rights reserved.  Copyright   © 2024 [employer] and/or its affiliates. All rights reserved.

## 2024-09-26 ENCOUNTER — TELEPHONE (OUTPATIENT)
Age: 79
End: 2024-09-26

## 2024-09-26 DIAGNOSIS — Z79.01 LONG TERM CURRENT USE OF ANTICOAGULANT: ICD-10-CM

## 2024-09-26 DIAGNOSIS — I48.0 PAROXYSMAL A-FIB (HCC): Primary | ICD-10-CM

## 2024-09-26 NOTE — TELEPHONE ENCOUNTER
Pt was just seen yesterday. Received call from pt's spouse, Leroy, stating he was told to check with his insurance company regarding switching from eliquis to pradaxa. He stated he called his insurance company who stated pradaxa is not covered but dapigatran (plavix) is. He is wondering if what Dr. Miranda's thoughts are regarding this. Please review and advise.

## 2024-09-27 RX ORDER — DABIGATRAN ETEXILATE 150 MG/1
150 CAPSULE ORAL 2 TIMES DAILY
Qty: 180 CAPSULE | Refills: 3 | OUTPATIENT
Start: 2024-09-27

## 2024-09-27 NOTE — TELEPHONE ENCOUNTER
Patient's EC Leroy called back.  They decided to go with the generic Pradaxa, which is more cost effective and will be sent to mail order.   Will have Dr Miranda approve next time he is in the office.

## 2024-10-01 ENCOUNTER — TRANSCRIBE ORDERS (OUTPATIENT)
Dept: SLEEP CENTER | Facility: CLINIC | Age: 79
End: 2024-10-01

## 2024-10-01 DIAGNOSIS — I48.0 PAROXYSMAL A-FIB (HCC): Primary | ICD-10-CM

## 2024-10-16 ENCOUNTER — TELEPHONE (OUTPATIENT)
Age: 79
End: 2024-10-16

## 2024-10-16 NOTE — TELEPHONE ENCOUNTER
Pt called in to cancel her Gastro appt compa for 10/31 gave her contact number from the chart to call them direct to cancel. Thanks

## 2024-10-21 DIAGNOSIS — K21.9 GASTROESOPHAGEAL REFLUX DISEASE, UNSPECIFIED WHETHER ESOPHAGITIS PRESENT: ICD-10-CM

## 2024-10-22 ENCOUNTER — HOSPITAL ENCOUNTER (OUTPATIENT)
Dept: NON INVASIVE DIAGNOSTICS | Facility: CLINIC | Age: 79
Discharge: HOME | End: 2024-10-22
Attending: INTERNAL MEDICINE
Payer: COMMERCIAL

## 2024-10-22 DIAGNOSIS — I48.0 PAROXYSMAL A-FIB (HCC): ICD-10-CM

## 2024-10-22 PROCEDURE — 93225 XTRNL ECG REC<48 HRS REC: CPT

## 2024-10-22 PROCEDURE — 93226 XTRNL ECG REC<48 HR SCAN A/R: CPT

## 2024-10-24 ENCOUNTER — HOSPITAL ENCOUNTER (OUTPATIENT)
Dept: SLEEP CENTER | Facility: HOSPITAL | Age: 79
Discharge: HOME | End: 2024-10-24
Attending: INTERNAL MEDICINE
Payer: COMMERCIAL

## 2024-10-24 DIAGNOSIS — I48.0 PAROXYSMAL A-FIB (HCC): ICD-10-CM

## 2024-10-24 PROCEDURE — G0399 HOME SLEEP TEST/TYPE 3 PORTA: HCPCS

## 2024-10-24 NOTE — PROGRESS NOTES
Home Sleep Study Documentation    HOME STUDY DEVICE: Noxturnal no                                           Macy G3 yes device # 26      Pre-Sleep Home Study:    Set-up and instructions performed by: Mitzi    Technician performed demonstration for Patient: yes    Return demonstration performed by Patient: yes    Written instructions provided to Patient: yes    Patient signed consent form: yes        Post-Sleep Home Study:    Additional comments by Patient: pending    Home Sleep Study Failed:pending    Failure reason: pending    Reported or Detected: pending    Scored by: pending

## 2024-10-28 ENCOUNTER — OFFICE VISIT (OUTPATIENT)
Dept: CARDIOLOGY CLINIC | Facility: CLINIC | Age: 79
End: 2024-10-28
Payer: COMMERCIAL

## 2024-10-28 VITALS
DIASTOLIC BLOOD PRESSURE: 70 MMHG | HEIGHT: 64 IN | HEART RATE: 62 BPM | BODY MASS INDEX: 21.17 KG/M2 | WEIGHT: 124 LBS | SYSTOLIC BLOOD PRESSURE: 132 MMHG

## 2024-10-28 DIAGNOSIS — I48.0 PAROXYSMAL A-FIB (HCC): Primary | ICD-10-CM

## 2024-10-28 DIAGNOSIS — I10 BENIGN ESSENTIAL HYPERTENSION: ICD-10-CM

## 2024-10-28 PROCEDURE — 99213 OFFICE O/P EST LOW 20 MIN: CPT | Performed by: INTERNAL MEDICINE

## 2024-10-28 PROCEDURE — 93227 XTRNL ECG REC<48 HR R&I: CPT | Performed by: INTERNAL MEDICINE

## 2024-10-28 NOTE — PROGRESS NOTES
" Patient ID: Margarito Nieves is a 78 y.o. female.        Plan:      Paroxysmal A-fib (HCC)  Cont Dilt  Cont DOAC  Await sleep study  Call if palpitations progress    Benign essential hypertension  Controlled       Follow up Plan/Other summary comments:  Advised no medication changes today.   Return in about 3 months (around 1/28/2025).  Call sooner with issues.     HPI: Margarito here to see how doing on new PAF meds, and review Holter.  The  latter showed NO Afib (went from 30% burden to 0%).  She still has brief palpitations, but no lengthly palpitations.  Had sleep study a few days ago, report pending.  On Pradaxa, $10/90 days.  Knows to avoid other blood thinnners.  Trying to cut back on her ETOH intake.  No recent falls, bleeding or bruising issues.    Most recent or relevant cardiac/vascular testing:    Sleep study pending    Holter, min HR 40, no Afib.      Past Surgical History:   Procedure Laterality Date    APPENDECTOMY      CATARACT EXTRACTION      LAPAROSCOPIC APPENDECTOMY      incidental     DE TENDON SHEATH INCISION Left 2/26/2019    Procedure: LONG FINGER TRIGGER FINGER RELEASE;  Surgeon: Ash Louis MD;  Location: [facility];  Service: Orthopedics    TONSILLECTOMY      TUBAL LIGATION         Lipid Profile: Reviewed      Review of Systems   10  point ROS  was otherwise non pertinent or negative except as per HPI or as below.         Objective:     /70   Pulse 62   Ht 5' 4\" (1.626 m)   Wt 56.2 kg (124 lb)   BMI 21.28 kg/m²     PHYSICAL EXAM:    General:  Normal appearance in no distress.  Eyes:  Anicteric.  Oral mucosa:  Moist.  Neck:  No JVD. Carotid upstrokes are brisk without bruits.  No masses.  Chest:  Clear to auscultation.  Cardiac:  Regular No palpable PMI.  Normal S1 and S2.  No murmur gallop or rub.  Abdomen:  Soft and nontender. No palpable organomegaly or aortic enlargement.  Extremities:  No peripheral edema.  Musculoskeletal:  Symmetric.   Vascular:  Pedal pulses are " "intact.  Neuro:  Grossly symmetric.  Psych:  Alert and oriented x3.      Meds reviewed.    Current Outpatient Medications:     Acetaminophen (TYLENOL ARTHRITIS PAIN PO), Take by mouth, Disp: , Rfl:     acetaminophen (TYLENOL) 325 mg tablet, Take 650 mg by mouth every 6 (six) hours as needed for mild pain, Disp: , Rfl:     ammonium lactate (LAC-HYDRIN) 12 % lotion, Apply topically 2 (two) times a day as needed for dry skin, Disp: 225 g, Rfl: 1    Cholecalciferol (VITAMIN D3) 50 MCG (2000 UT) TABS, Take 2,000 Units by mouth daily, Disp: , Rfl:     dabigatran etexilate (PRADAXA) 150 mg capsu, Take 1 capsule (150 mg total) by mouth 2 (two) times a day, Disp: 180 capsule, Rfl: 3    diltiazem (DILACOR XR) 120 MG 24 hr capsule, Take 1 capsule (120 mg total) by mouth daily, Disp: 30 capsule, Rfl: 5    fluticasone (FLONASE) 50 mcg/act nasal spray, into each nostril, Disp: , Rfl:     multivitamin (THERAGRAN) TABS, Take 1 tablet by mouth in the morning., Disp: , Rfl:     omeprazole (PriLOSEC) 20 mg delayed release capsule, take 1 capsule by mouth once daily, Disp: 90 capsule, Rfl: 0    Tuberculin-Allergy Syringes (ALLERGY SYRINGE 1CC/27GX1/2\") 27G X 1/2\" 1 ML MISC, Use Receives allergy injections once a month through allergist, Disp: , Rfl:     vitamin B-12 (CYANOCOBALAMIN) 100 MCG TABS, Take 50 mcg by mouth daily, Disp: , Rfl:     VITAMIN D PO, Take by mouth, Disp: , Rfl:      Past Medical History:   Diagnosis Date    Cellulitis of right hand 05/10/2022    Closed fracture of right proximal humerus 02/15/2020    GERD (gastroesophageal reflux disease)     NSTEMI (non-ST elevated myocardial infarction) (HCC) 02/16/2020    due to demand ischemia post fall/fracture    Osteopenia 10/23/2017    PAF (paroxysmal atrial fibrillation)     Syncope            Social History     Tobacco Use   Smoking Status Former    Current packs/day: 1.00    Types: Cigarettes   Smokeless Tobacco Never   Tobacco Comments    age [age]              "

## 2024-10-28 NOTE — PATIENT INSTRUCTIONS
"Patient Education     Atrial fibrillation   The Basics   Written by the doctors and editors at [facility]   What is atrial fibrillation? -- Atrial fibrillation is the most common heart rhythm problem (figure 1). The condition can put you at risk of stroke and other problems, as well as death. Atrial fibrillation is sometimes called \"A-fib.\"  The top 2 chambers of your heart are called the \"atria.\" They pump blood into the larger bottom chambers, which pump blood to your lungs and the rest of your body. In A-fib, your heart beats abnormally and the top chambers stop pumping blood as strongly as normal.  In some people, A-fib never goes away. In others, A-fib can come and go, even with treatment. If you had A-fib in the past, but have a normal heart rhythm now, ask your doctor what you can do to keep A-fib from coming back.  You might be able to lower your chances of having A-fib again if you:   Control your blood pressure   Avoid or limit alcohol   Cut down on caffeine   Get treatment for an overactive thyroid gland   Get regular exercise   Lose weight (if you are overweight)   Reduce stress  What are the symptoms of A-fib? -- Some people with A-fib have no symptoms. When symptoms do happen, they can include:   Feeling as though your heart is racing, skipping beats, or beating out of sync   Mild chest \"tightness\" or pain   Feeling lightheaded, dizzy, or like you might pass out   Having trouble breathing, especially with exercise  Is there a test for A-fib? -- Yes. If your doctor or nurse thinks that you might have A-fib, they will probably do a test called an electrocardiogram (\"ECG\"). It records the electrical activity in your heart.  How is A-fib treated? -- In some cases, A-fib goes away on its own, even without treatment. But many people do need treatment.  Treatment can include 1 or more of the following:   Medicines to control the speed or rhythm of the heartbeat   Medicines to keep clots from forming   " "\"Cardioversion\" - This involves applying an electrical current to the heart to fix its rhythm.   \"Ablation\" - These treatments involve destroying the small part of the heart that is sending abnormal electrical signals. This can be done using heat (\"radiofrequency ablation\") or cold (\"cryoablation\").   Pacemaker - This is a device that is implanted in the body and sends electrical signals to the heart to control the heartbeat.  What will my life be like? -- Most people with A-fib are able to live normal lives. Still, it is important to take the medicines that your doctor prescribes every day. Taking your medicines as directed can help reduce the chances that your A-fib will cause a stroke. It's also a good idea to learn what the signs and symptoms of a stroke are (figure 2).  When should I call the doctor? -- Call for an ambulance (in the [place] and [place], call 9-1-1) if:   You have severe trouble breathing, or you pass out.   You have signs of a heart attack, which might include:   Severe chest pain, pressure, or discomfort with:   Breathing trouble, sweating, upset stomach, or cold and clammy skin   Pain in your arms, back, or jaw   Pain that gets worse with activity like walking up stairs   You have signs of a stroke, which might include:   Numbness or weakness of the face, arm, or leg, especially on 1 side of the body   Confusion, or trouble speaking or understanding   Trouble seeing in 1 or both eyes   Trouble walking, dizziness, or loss of balance or coordination   Severe headache with no known cause  Call your doctor for advice if:   You have trouble breathing when talking or sitting still.   You feel your heart racing, and it does not stop after a while (for example, 1 hour).   You are lightheaded or more tired than normal.  All topics are updated as new evidence becomes available and our peer review process is complete.  This topic retrieved from [employer] on: Feb 26, 2024.  Topic 07052 Version 25.0  Release: " "32.2.4 - C32.56  © 2024 UpToDate, Inc. and/or its affiliates. All rights reserved.  figure 1: Atrial fibrillation     This drawing shows where the heart is located in the chest. In atrial fibrillation (\"A-fib\"), the electrical signals that control the heartbeat are abnormal. As a result, the top 2 chambers of the heart (see arrows) stop pumping effectively, and blood that should move out of these chambers gets left behind.  Graphic 40611 Version 6.0  figure 2: BE FAST to help remember stroke symptoms     One way to help remember stroke symptoms is to think of the words \"BE FAST.\" If a person shows any of these signs, call for an ambulance right away (in the [place] and [place], call 9-1-1).  Graphic 20346 Version 10.0  Consumer Information Use and Disclaimer   Disclaimer: This generalized information is a limited summary of diagnosis, treatment, and/or medication information. It is not meant to be comprehensive and should be used as a tool to help the user understand and/or assess potential diagnostic and treatment options. It does NOT include all information about conditions, treatments, medications, side effects, or risks that may apply to a specific patient. It is not intended to be medical advice or a substitute for the medical advice, diagnosis, or treatment of a health care provider based on the health care provider's examination and assessment of a patient's specific and unique circumstances. Patients must speak with a health care provider for complete information about their health, medical questions, and treatment options, including any risks or benefits regarding use of medications. This information does not endorse any treatments or medications as safe, effective, or approved for treating a specific patient. UpToDate, Inc. and its affiliates disclaim any warranty or liability relating to this information or the use thereof.The use of this information is governed by the Terms of Use, available at " [employer] https://www.woltersAlgae International Groupuwer.com/en/know/clinical-effectiveness-terms. 2024© [employer]. and its affiliates and/or licensors. All rights reserved.  Copyright   © 2024 [employer] and/or its affiliates. All rights reserved.

## 2024-10-29 PROBLEM — G47.33 OSA (OBSTRUCTIVE SLEEP APNEA): Status: ACTIVE | Noted: 2024-10-29

## 2024-10-30 PROBLEM — R06.83 SNORING: Status: ACTIVE | Noted: 2024-10-30

## 2024-10-30 PROCEDURE — 95806 SLEEP STUDY UNATT&RESP EFFT: CPT

## 2024-10-31 ENCOUNTER — TELEPHONE (OUTPATIENT)
Dept: CARDIOLOGY CLINIC | Facility: CLINIC | Age: 79
End: 2024-10-31

## 2024-10-31 DIAGNOSIS — I48.0 PAROXYSMAL A-FIB (HCC): Primary | ICD-10-CM

## 2024-10-31 DIAGNOSIS — I10 BENIGN ESSENTIAL HYPERTENSION: ICD-10-CM

## 2024-10-31 NOTE — TELEPHONE ENCOUNTER
----- Message from Luis Manuel Miranda DO sent at 10/31/2024 11:49 AM EDT -----  Please call the patient regarding her abnormal sleep result.  Suggests mild sleep apnea.  Id like her to see Sleep Dr for a consultation.  TRB

## 2024-11-07 NOTE — TELEPHONE ENCOUNTER
Called patient to schedule sleep consultation.  Left message providing number for central scheduling to call to schedule consult [phone].

## 2024-11-14 ENCOUNTER — OFFICE VISIT (OUTPATIENT)
Dept: GASTROENTEROLOGY | Facility: CLINIC | Age: 79
End: 2024-11-14
Payer: COMMERCIAL

## 2024-11-14 ENCOUNTER — TELEPHONE (OUTPATIENT)
Dept: GASTROENTEROLOGY | Facility: CLINIC | Age: 79
End: 2024-11-14

## 2024-11-14 VITALS
SYSTOLIC BLOOD PRESSURE: 120 MMHG | HEIGHT: 64 IN | HEART RATE: 52 BPM | OXYGEN SATURATION: 99 % | TEMPERATURE: 97.8 F | DIASTOLIC BLOOD PRESSURE: 76 MMHG | BODY MASS INDEX: 21 KG/M2 | WEIGHT: 123 LBS

## 2024-11-14 DIAGNOSIS — R10.13 EPIGASTRIC ABDOMINAL PAIN: ICD-10-CM

## 2024-11-14 DIAGNOSIS — Z79.01 CHRONIC ANTICOAGULATION: ICD-10-CM

## 2024-11-14 DIAGNOSIS — R68.81 EARLY SATIETY: Primary | ICD-10-CM

## 2024-11-14 DIAGNOSIS — R12 HEARTBURN: ICD-10-CM

## 2024-11-14 DIAGNOSIS — R14.0 ABDOMINAL BLOATING: ICD-10-CM

## 2024-11-14 DIAGNOSIS — K59.00 CONSTIPATION, UNSPECIFIED CONSTIPATION TYPE: ICD-10-CM

## 2024-11-14 DIAGNOSIS — Z86.39 HISTORY OF IRON DEFICIENCY: ICD-10-CM

## 2024-11-14 PROCEDURE — 99214 OFFICE O/P EST MOD 30 MIN: CPT | Performed by: PHYSICIAN ASSISTANT

## 2024-11-14 RX ORDER — SODIUM CHLORIDE, SODIUM LACTATE, POTASSIUM CHLORIDE, CALCIUM CHLORIDE 600; 310; 30; 20 MG/100ML; MG/100ML; MG/100ML; MG/100ML
125 INJECTION, SOLUTION INTRAVENOUS CONTINUOUS
OUTPATIENT
Start: 2024-11-14

## 2024-11-14 RX ORDER — FERROUS GLUCONATE 324(38)MG
324 TABLET ORAL
COMMUNITY

## 2024-11-14 RX ORDER — MULTIVIT WITH MINERALS/LUTEIN
1000 TABLET ORAL DAILY
COMMUNITY

## 2024-11-14 NOTE — PATIENT INSTRUCTIONS
You have a history of heartburn for which you take omeprazole.  Despite this, you are having GI symptoms to include abdominal pain, bloating, incomplete evacuation of stool, and fullness when you eat.  We are going to investigate with an upper endoscopy and ultrasound of the abdomen and blood work.  I would like you to temporarily stop the fiber powder as some people find this worsens bloating.  I would also like you to at least cut back on the iron as this can cause a lot of GI upset.  For bloating, look into low FODMAP diet.   Okay for Gas-x 3-4 times daily.   I would like you to start on 1 capful or 1 dose of MiraLAX in 8 ounces of liquid every single day.  Outside of this, I would like you to drink more noncaffeinated nonalcoholic liquid throughout the day because you need to be hydrated in order to get the bowels moving.  Cut back on alcohol.

## 2024-11-14 NOTE — PROGRESS NOTES
[facility] - Outpatient Follow-up Note  Margarito Nieves 79 y.o. female MRN: [id number]  Encounter: [id number]    ASSESSMENT AND PLAN:      1. Early satiety (Primary)  2. Epigastric abdominal pain  3. Heartburn  4. Chronic anticoagulation     Patient with a history of GERD who has had 1+ years of early satiety and upper abdominal bloating/discomfort despite PPI therapy.  She is a questionable historian and I am not certain of the significance or etiology of symptoms at present.  We reviewed there may be underlying gastritis, PUD, dysmotility, biliary pathology, functional dyspepsia, IBS, SIBO, other intra-abdominal pathology.  At this time, recommend EGD to evaluate for intraluminal pathology given treatment refractory symptoms.  Recommend small frequent meals.  Work on constipation prevention as below.  Check ultrasound to evaluate for biliary pathology.    I discussed informed consent with the patient. The risks/benefits/alternatives of the procedure were discussed with the patient. Risks included, but not limited to, infection, bleeding, perforation, injury to organs in the abdomen, missed lesion and incomplete procedure were discussed. Patient was agreeable.    Pt will require anticoagulation hold. Follows with Cardiology regularly, Dr. Miranda.     - EGD; Future  - US abdomen complete; Future  - IgA; Future  - Tissue transglutaminase, IgA; Future    5. Abdominal bloating  6. Constipation, unspecified constipation type    Hx of such, I suspect in part 2/2 to constipation and incomplete evacuation of stool.   Trial cutting back or d/c iron supplementation temporarily.   Trial d/c fiber supplementation temporarily.   Look into low FODMAP diet.   Start 17g miralax / 8 ounces of liquid daily.   Needs to really work on hydration, I do not suspect stool output will improve much unless she focuses on this.   I encouraged pt to improve noncaffeinated nonalcoholic beverage intake and cut back  "on alcohol intake.  Consider SIBO testing in future.   UTD on colonoscopy, no intraluminal pathology in 2022.     7. History of iron deficiency     Patient has a history of macrocytic anemia.  Iron studies do not demonstrate iron deficiency at this time, nor did they back in 2021, therefore I think it would be reasonable to trial off of iron to see if this is worsening or contributing to some of her GI symptoms including constipation and bloating.   If pt develops progressive MANI, consider infusions in the future.     Follow up after endoscopic evaluation.   ______________________________________________________________________    SUBJECTIVE: Patient is a 79 y.o. female who presents today for follow-up regarding GERD/nausea. Pmhx sig for PAF on Pradaxa, CAD, RA, osteoporosis, allergic rhinitis.     Pt was last evaluated in 12/2021 for change in bowel habits. Notes indicate pt was having issues with incomplete evacuation and bloating at that time. Had colonoscopy completed in 02/2022 which was unremarkable.     11/14/24:     Pt shares she has been dealing with 1+ years of symptoms of abd bloating, lots of flatus/belching, issues with fullness/early satiety. Has hx of heartburn though feels for the most part omeprazole is working well. No nausea/emesis. No dysphagia or odynophagia. No abnormal weight loss over past 6 months. Unclear of any obvious triggers/precipitants.     Also struggling with her bowels. This has been issue for some time, at least a year if not longer. Has to push to defecate, feels a sense of incomplete evacuation. Stool is hard, small cornelio sometimes. Has BM almost daily though typically only small volume. Anywhere from [place] 1-4. No BRBPR or melena. No nocturnal BM. Shares she \"lives on [employer]\". Takes dulcolax laxatives frequently. Takes fiber supplement and probiotic.     Drinks 1 cup of water, some coffee, juice, and 3 1/2 glasses of red wine nightly.     NSAIDs: none   Etoh: 3 glasses of " wine daily   Tobacco: none      07/2024: Hb 12.7, , Plt 301, BUN 14, Cr 1.06, AST 19, ALT 19, , albumin 3.9, t bili 0.51, TSH 1.580, TSAT 48, Iron 120, TIBC 252, ferritin 156    Endoscopic history:   EGD:   Colon: 02/2022: Multiple large severe diverticula causing moderate luminal narrowing (traversable) in the sigmoid colon; External, internal hemorrhoids     Review of Systems   Otherwise Per HPI    Historical Information   Past Medical History:   Diagnosis Date    Cellulitis of right hand 05/10/2022    Closed fracture of right proximal humerus 02/15/2020    GERD (gastroesophageal reflux disease)     NSTEMI (non-ST elevated myocardial infarction) (HCC) 02/16/2020    due to demand ischemia post fall/fracture    Osteopenia 10/23/2017    PAF (paroxysmal atrial fibrillation)     Syncope      Past Surgical History:   Procedure Laterality Date    APPENDECTOMY      CATARACT EXTRACTION      LAPAROSCOPIC APPENDECTOMY      incidental     [place] TENDON SHEATH INCISION Left 2/26/2019    Procedure: LONG FINGER TRIGGER FINGER RELEASE;  Surgeon: Ash Louis MD;  Location: [facility];  Service: Orthopedics    TONSILLECTOMY      TUBAL LIGATION       Social History   Social History     Substance and Sexual Activity   Alcohol Use Yes    Alcohol/week: 10.0 standard drinks of alcohol    Types: 10 Glasses of wine per week     Social History     Substance and Sexual Activity   Drug Use No     Social History     Tobacco Use   Smoking Status Former    Current packs/day: 1.00    Types: Cigarettes   Smokeless Tobacco Never   Tobacco Comments    age [age]      Family History   Problem Relation Age of Onset    Lung cancer Mother     Hypertension Mother     Cancer Mother     Cerebral aneurysm Father         bleed    Alcohol abuse Father     Cirrhosis Father     Other Father         had fallen    Other Sister         unkownn cause     Other Sister         in MVA    No Known Problems Daughter     No Known Problems Daughter     No  "Known Problems Maternal Grandmother     No Known Problems Maternal Grandfather     No Known Problems Paternal Grandmother     No Known Problems Paternal Grandfather     Pneumonia Brother         infancy of pneumonia    No Known Problems Son        Meds/Allergies       Current Outpatient Medications:     Acetaminophen (TYLENOL ARTHRITIS PAIN PO)    acetaminophen (TYLENOL) 325 mg tablet    ammonium lactate (LAC-HYDRIN) 12 % lotion    Ascorbic Acid (vitamin C) 1000 MG tablet    Cholecalciferol (VITAMIN D3) 50 MCG (2000 UT) TABS    dabigatran etexilate (PRADAXA) 150 mg capsu    diltiazem (DILACOR XR) 120 MG 24 hr capsule    ferrous gluconate (FERGON) 324 mg tablet    fluticasone (FLONASE) 50 mcg/act nasal spray    multivitamin (THERAGRAN) TABS    omeprazole (PriLOSEC) 20 mg delayed release capsule    Tuberculin-Allergy Syringes (ALLERGY SYRINGE 1CC/27GX1/2\") 27G X 1/2\" 1 ML MISC    vitamin B-12 (CYANOCOBALAMIN) 100 MCG TABS    Allergies   Allergen Reactions    Cat Hair Extract     Dust Mite Extract     Iv Dye  [Iodinated Contrast Media] Hives     Category: Allergy;     Other      Possible reaction to chlorhexadine, but this occurred days after and in other areas of the body than just the area prepped    Pollen Extract      Seasonal allergies     Objective     Blood pressure 120/76, pulse (!) 52, temperature 97.8 °F (36.6 °C), temperature source Temporal, height 5' 4\" (1.626 m), weight 55.8 kg (123 lb), SpO2 99%. Body mass index is 21.11 kg/m².    Physical Exam  Vitals and nursing note reviewed.   Constitutional:       General: She is not in acute distress.     Appearance: She is well-developed.   HENT:      Head: Normocephalic and atraumatic.   Eyes:      General: No scleral icterus.     Conjunctiva/sclera: Conjunctivae normal.   Pulmonary:      Effort: Pulmonary effort is normal. No respiratory distress.   Abdominal:      General: Bowel sounds are normal. There is no distension.      Palpations: Abdomen is soft.      " Tenderness: There is abdominal tenderness. There is no guarding or rebound.   Skin:     General: Skin is warm and dry.      Coloration: Skin is not jaundiced.   Neurological:      General: No focal deficit present.      Mental Status: She is alert.   Psychiatric:         Mood and Affect: Mood normal.       Lab Results:   No visits with results within 1 Day(s) from this visit.   Latest known visit with results is:   Office Visit on 08/15/2024   Component Date Value    LEUKOCYTE ESTERASE,UA 08/15/2024 Negative     NITRITE,UA 08/15/2024 Negative     [facility] POCT UROBILINOGEN 08/15/2024 Negative     POCT URINE PROTEIN 08/15/2024 2++      PH,UA 08/15/2024 5.0     BLOOD,UA 08/15/2024 3+++     SPECIFIC GRAVITY,UA 08/15/2024 1.030     KETONES,UA 08/15/2024 0.5     BILIRUBIN,UA 08/15/2024 1+     GLUCOSE, UA 08/15/2024 Negative      COLOR,UA 08/15/2024 Yellow     CLARITY,UA 08/15/2024 Clear      Radiology Results:   Home Study  Result Date: 10/30/2024  Narrative: Table formatting from the original result was not included. Images from the original result were not included. Home Study Report Patient Name: Margarito Nieves Patient Number: [id number] Date of Home Study: 10/24/2024 Referring Physician: Luis Manuel Miranda Interpreting Physician:  Lacey CARSON.: 1945 STUDY FORMAT: The patient was admitted to the sleep laboratory at the [facility] and oriented to the home sleep study testing procedure and equipment.  The home sleep testing study was performed using the [employer] Night One device.  A return demonstration by the patient helped to assure correct usage.  The following parameters were monitored: p-flow via nasal cannula, body position, oximetry, pulse rate and respiratory effort via thoracic belt.  The sleep study was scored following the rules established by the American Academy of Sleep Medicine (AASM).  Hypopneas are defined as a >=30% drop in flow for >=10 seconds that are  associated with >=4% desaturations.  NEELAM is the Respiratory Event Index (number of respiratory events per hour) and is a surrogate for the apnea/hypopnea index (AHI). PATIENT HISTORY: Margarito Nieves is a 78 y.o. yo female with PMHx of HTN, PAF, RA and GERD.  The patient was referred for a home sleep apnea test to evaluate for sleep disordered breathing in the setting of snoring. TESTING RESULTS: The test results are from [facility].  The total time in bed (analysis time) was 535.3 minutes.  The patient had a total of 63 respiratory events made up of 25 obstructive apneas, 0 central apneas, 0 mixed apneas and 38 hypopneas resulting in a respiratory event index (NEELAM) of 8.4.  The lowest SpO2 recorded is 83%.     Impression: - This study confirms a diagnosis of at least mild obstructive sleep apnea, with moderate respiratory-event-related hypoxia. - The results of this study may represent an underestimation of the degree of obstructive sleep apnea, especially hypopneas, because of the known limitations of HSAT, such as inability to record arousals because EEG is not recorded. - Baseline oxygen saturation was normal. - Treatment of mild sleep apnea can include weight loss, positional therapy, treatment of allergies, oral appliance therapy or ENT evaluation of any airway abnormalities. PAP therapy may be considered in patients with documented symptoms of daytime sleepiness, impaired cognition, mood disorder, insomnia, or documented hypertension, ischemic heart disease, or history of stroke. Lacey Piña MD Board Certified Sleep Physician  Study Date: 10/24/2024 Patient Name: Margarito Nieves Recording Device: OneRoof Sex: F Height:    in. : 1945 Weight:    lbs. Age: 78 years B.M.I:    lb/in2 Times and Durations Lights off clock time:  9:36:14 PM Total Recording Time (TRT): 536.3 minutes Lights on clock time: 6:31:32 AM Time In Bed (TIB): 535.3 minutes   Monitoring Time (MT): 451.5 minutes Device and Sensor  Details The study was recorded on a [employer] NightOne device using 1 RIP effort belt and a pressure based flow sensor. The heart rate is derived from the oximeter sensor and the snore signal is derived from the pressure sensor. The device also records body position and uses it to determine the monitoring time (sleep/wake periods). Summary NEELAM 8.4 OAI 3.3 TESSIE 0.0 Lowest Desat 83 NEELAM is the number of respiratory events per hour. OAI is the number of obstructive apneas per hour. TESSIE is the number of central apneas per hour. Lowest Desat is the lowest blood oxygen level that lasted at least 2 seconds. RESPIRATORY EVENTS  Index (#/hour) Total # of Events Mean duration  (sec) Max duration  (sec) # of Events by Position      Supine Prone Left Right Up Central Apneas 0.0 0 0.0 0.0 0 0 0 0 0 Obstructive Apneas 3.3 25 19.9 38.5 0 25 0 0 0 Mixed Apneas 0.0 0 0.0 0.0 0 0 0 0 0 Hypopneas 5.0 38 27.6 57.5 0 26 2 10 0 Apneas + Hypopneas 8.4 63 24.6 57.5 0 51 2 10 0 RERAs 0.0 0 0.0 0.0 0 0 0 0 0 Total 8.4 63 24.6 57.5 0 51 2 10 0 Time in Position 1.5 187.1 136.6 128.7 72.1 NEELAM in Position 0.0 16.4 0.9 4.7 0.0 Positional Summary  Supine Non-Supine Total # of Events 0 63.00 Total Duration (minutes) 1.5 524.50 Sleep Duration (minutes) 0.2 451.30 NEELAM in Position 0.0 8.38 REISupine / REINon-Supine Ratio 0.00  Positional Sleep Apnea is indicated if NEELAM (supine) >= 2 x NEELAM (non-supine) per Prieto, Sleep. 1984;7(2).  Oximetry Summary  Dur. (min) % TIB <90 % 21.6 4.0 <85 % 0.1 0.0 <80 % 0.0 0.0 <70 % 0.0 0.0 Total Dur (min) < 89 4.3 min Average (%) 93 Total # of Desats 68 Desat Index (#/hour) 9.1 Desat Max (%) 8 Desat Max dur (sec) 73.0 Lowest SpO2 % during sleep 83 Duration of Min SpO2 (sec) 2 Highest SpO2 % during sleep 98 Duration of Max SpO2(sec) 21  Heart Rate Stats Mean HR during sleep 72.5 (BPM) Highest HR during sleep 142  (BPM) Highest HR during TIB  142 (BPM) Lowest HR during sleep 41  (BPM) Lowest HR during  TIB 31 (BPM) Snoring Summary Total Snoring Episodes 1 Total Duration with Snoring 0.1 minutes Mean Duration of Snoring 4.5 seconds Percentage of Snoring 0.0 %      Holter monitor  Result Date: 10/28/2024  Narrative: PT NAME: Margarito Nieves : 1945  AGE: 78 y.o.  GENDER: female MRN: [id number]   PROCEDURE: Holter monitor INDICATIONS: PAF     Impression: The patient had predominantly sinus rhythm. Heart rate varied from 40 bpm to 106 bpm.  The patient’s average heart rate was 63 bpm.  The patient had a holter monitor tracing done for 23 hours and 59 minutes. The patient had rare ventricular ectopic activity versus Anahy Phenomenon, 27 PVCs in all. The patient had occasional supraventricular ectopy, (0.8% burden of PACs), 5 brief (3-5 beats) atrial runs.  No Afib. The longest R/R interval was 1.6 seconds. No other major arrhythmia was noted. No diary sx attached. RECOMMENDATIONS:  No new.       Miranda sEpino PA-C    **Please note:  Dictation voice to text software may have been used in the creation of this record.  Occasional wrong word or “sound alike” substitutions may have occurred due to the inherent limitations of voice recognition software.  Read the chart carefully and recognize, using context, where substitutions have occurred.**

## 2024-11-14 NOTE — TELEPHONE ENCOUNTER
Our mutual patient is scheduled for procedure: EGD    On: 12/4/24     With:     He/She is taking the following blood thinner:  Pradaxa       Can this be stopped 2 days prior to the procedure?      Physician Approving clearance: Dr. Miranda      [facility]   [phone]

## 2024-11-20 ENCOUNTER — APPOINTMENT (OUTPATIENT)
Dept: LAB | Facility: CLINIC | Age: 79
End: 2024-11-20
Payer: COMMERCIAL

## 2024-11-20 DIAGNOSIS — R68.81 EARLY SATIETY: ICD-10-CM

## 2024-11-20 LAB — IGA SERPL-MCNC: 211 MG/DL (ref 66–433)

## 2024-11-20 PROCEDURE — 86364 TISS TRNSGLTMNASE EA IG CLAS: CPT

## 2024-11-20 PROCEDURE — 82784 ASSAY IGA/IGD/IGG/IGM EACH: CPT

## 2024-11-20 PROCEDURE — 36415 COLL VENOUS BLD VENIPUNCTURE: CPT

## 2024-11-21 ENCOUNTER — HOSPITAL ENCOUNTER (OUTPATIENT)
Dept: ULTRASOUND IMAGING | Facility: HOSPITAL | Age: 79
End: 2024-11-21
Payer: COMMERCIAL

## 2024-11-21 DIAGNOSIS — R68.81 EARLY SATIETY: ICD-10-CM

## 2024-11-21 DIAGNOSIS — R12 HEARTBURN: ICD-10-CM

## 2024-11-21 DIAGNOSIS — R10.13 EPIGASTRIC ABDOMINAL PAIN: ICD-10-CM

## 2024-11-21 PROCEDURE — 76700 US EXAM ABDOM COMPLETE: CPT

## 2024-11-23 LAB — TTG IGA SER-ACNC: <2 U/ML (ref 0–3)

## 2024-11-27 ENCOUNTER — RESULTS FOLLOW-UP (OUTPATIENT)
Dept: GASTROENTEROLOGY | Facility: CLINIC | Age: 79
End: 2024-11-27

## 2024-12-04 ENCOUNTER — ANESTHESIA EVENT (OUTPATIENT)
Dept: GASTROENTEROLOGY | Facility: HOSPITAL | Age: 79
End: 2024-12-04
Payer: COMMERCIAL

## 2024-12-04 ENCOUNTER — ANESTHESIA (OUTPATIENT)
Dept: GASTROENTEROLOGY | Facility: HOSPITAL | Age: 79
End: 2024-12-04
Payer: COMMERCIAL

## 2024-12-04 ENCOUNTER — HOSPITAL ENCOUNTER (OUTPATIENT)
Dept: GASTROENTEROLOGY | Facility: HOSPITAL | Age: 79
Setting detail: OUTPATIENT SURGERY
Discharge: HOME | End: 2024-12-04
Payer: COMMERCIAL

## 2024-12-04 VITALS
OXYGEN SATURATION: 98 % | DIASTOLIC BLOOD PRESSURE: 67 MMHG | SYSTOLIC BLOOD PRESSURE: 147 MMHG | RESPIRATION RATE: 22 BRPM | HEART RATE: 56 BPM | TEMPERATURE: 97.9 F

## 2024-12-04 DIAGNOSIS — R68.81 EARLY SATIETY: ICD-10-CM

## 2024-12-04 DIAGNOSIS — K22.70 BARRETT'S ESOPHAGUS WITHOUT DYSPLASIA: Primary | ICD-10-CM

## 2024-12-04 DIAGNOSIS — R12 HEARTBURN: ICD-10-CM

## 2024-12-04 PROCEDURE — 43239 EGD BIOPSY SINGLE/MULTIPLE: CPT | Performed by: STUDENT IN AN ORGANIZED HEALTH CARE EDUCATION/TRAINING PROGRAM

## 2024-12-04 PROCEDURE — 88305 TISSUE EXAM BY PATHOLOGIST: CPT | Performed by: STUDENT IN AN ORGANIZED HEALTH CARE EDUCATION/TRAINING PROGRAM

## 2024-12-04 RX ORDER — OMEPRAZOLE 40 MG/1
40 CAPSULE, DELAYED RELEASE ORAL DAILY
Qty: 30 CAPSULE | Refills: 11 | Status: SHIPPED | OUTPATIENT
Start: 2024-12-04

## 2024-12-04 RX ORDER — PROPOFOL 10 MG/ML
INJECTION, EMULSION INTRAVENOUS AS NEEDED
Status: DISCONTINUED | OUTPATIENT
Start: 2024-12-04 | End: 2024-12-04

## 2024-12-04 RX ORDER — SODIUM CHLORIDE, SODIUM LACTATE, POTASSIUM CHLORIDE, CALCIUM CHLORIDE 600; 310; 30; 20 MG/100ML; MG/100ML; MG/100ML; MG/100ML
125 INJECTION, SOLUTION INTRAVENOUS CONTINUOUS
Status: DISCONTINUED | OUTPATIENT
Start: 2024-12-04 | End: 2024-12-08 | Stop reason: HOSPADM

## 2024-12-04 RX ORDER — LIDOCAINE HYDROCHLORIDE 20 MG/ML
INJECTION, SOLUTION EPIDURAL; INFILTRATION; INTRACAUDAL; PERINEURAL AS NEEDED
Status: DISCONTINUED | OUTPATIENT
Start: 2024-12-04 | End: 2024-12-04

## 2024-12-04 RX ORDER — SODIUM CHLORIDE, SODIUM LACTATE, POTASSIUM CHLORIDE, CALCIUM CHLORIDE 600; 310; 30; 20 MG/100ML; MG/100ML; MG/100ML; MG/100ML
INJECTION, SOLUTION INTRAVENOUS CONTINUOUS PRN
Status: DISCONTINUED | OUTPATIENT
Start: 2024-12-04 | End: 2024-12-04

## 2024-12-04 RX ADMIN — PROPOFOL 80 MG: 10 INJECTION, EMULSION INTRAVENOUS at 13:11

## 2024-12-04 RX ADMIN — SODIUM CHLORIDE, SODIUM LACTATE, POTASSIUM CHLORIDE, AND CALCIUM CHLORIDE 125 ML/HR: .6; .31; .03; .02 INJECTION, SOLUTION INTRAVENOUS at 12:30

## 2024-12-04 RX ADMIN — PROPOFOL 50 MG: 10 INJECTION, EMULSION INTRAVENOUS at 13:13

## 2024-12-04 RX ADMIN — PROPOFOL 50 MG: 10 INJECTION, EMULSION INTRAVENOUS at 13:16

## 2024-12-04 RX ADMIN — LIDOCAINE HYDROCHLORIDE 60 MG: 20 INJECTION, SOLUTION EPIDURAL; INFILTRATION; INTRACAUDAL; PERINEURAL at 13:11

## 2024-12-04 RX ADMIN — SODIUM CHLORIDE, SODIUM LACTATE, POTASSIUM CHLORIDE, AND CALCIUM CHLORIDE: .6; .31; .03; .02 INJECTION, SOLUTION INTRAVENOUS at 12:52

## 2024-12-04 NOTE — ANESTHESIA POSTPROCEDURE EVALUATION
Post-Op Assessment Note    CV Status:  Stable  Pain Score: 0    Pain management: adequate       Mental Status:  Arousable   PONV Controlled:  None   Airway Patency:  Patent     Post Op Vitals Reviewed: Yes    No anethesia notable event occurred.    Staff: CRNA       Last Filed PACU Vitals:  Vitals Value Taken Time   Temp     Pulse 60    /63    Resp 16    SpO2 99%        Modified Delmy:  No data recorded

## 2024-12-04 NOTE — ANESTHESIA PREPROCEDURE EVALUATION
Procedure:  EGD    Occasionally has passing out episodes and palpitations, recently saw Cardiologist with negative cardiac workup other than paroxysmal A.fib but patient noted to be in sinus rhythm today     Relevant Problems   CARDIO   (+) Benign essential hypertension   (+) New onset a-fib (HCC)   (+) Paroxysmal A-fib (HCC)      GI/HEPATIC   (+) GERD (gastroesophageal reflux disease)      HEMATOLOGY   (+) Anemia      MUSCULOSKELETAL   (+) Hip arthritis   (+) Rheumatoid arthritis involving both hands with positive rheumatoid factor (HCC)      PULMONARY   (+) JEANIE (obstructive sleep apnea)        Physical Exam    Airway    Mallampati score: II  TM Distance: >3 FB  Neck ROM: full     Dental   Comment: Poor dental hygiene, denies any loose teeth      Cardiovascular  Cardiovascular exam normal    Pulmonary  Pulmonary exam normal     Other Findings  post-pubertal.      Anesthesia Plan  ASA Score- 3     Anesthesia Type- IV sedation with anesthesia with ASA Monitors.         Additional Monitors:     Airway Plan:            Plan Factors-Exercise tolerance (METS): >4 METS.    Chart reviewed. EKG reviewed.  Existing labs reviewed. Patient summary reviewed.                  Induction-     Postoperative Plan-         Informed Consent- Anesthetic plan and risks discussed with patient.  I personally reviewed this patient with the CRNA. Discussed and agreed on the Anesthesia Plan with the CRNA..

## 2024-12-04 NOTE — H&P
History and Physical - [facility]  Margarito Nieves 79 y.o. female MRN: [id number]                  HPI: Margarito Nieves is a 79 y.o. year old female who presents for GERD, early satiety      REVIEW OF SYSTEMS: Per the HPI, and otherwise unremarkable.    Historical Information   Past Medical History:   Diagnosis Date    Cellulitis of right hand 05/10/2022    Closed fracture of right proximal humerus 02/15/2020    GERD (gastroesophageal reflux disease)     NSTEMI (non-ST elevated myocardial infarction) ([facility]) 02/16/2020    due to demand ischemia post fall/fracture    Osteopenia 10/23/2017    PAF (paroxysmal atrial fibrillation)     Syncope      Past Surgical History:   Procedure Laterality Date    APPENDECTOMY      CATARACT EXTRACTION      LAPAROSCOPIC APPENDECTOMY      incidental     DE TENDON SHEATH INCISION Left 2/26/2019    Procedure: LONG FINGER TRIGGER FINGER RELEASE;  Surgeon: Ash Louis MD;  Location:  MAIN OR;  Service: Orthopedics    TONSILLECTOMY      TUBAL LIGATION       Social History   Social History     Substance and Sexual Activity   Alcohol Use Yes    Alcohol/week: 10.0 standard drinks of alcohol    Types: 10 Glasses of wine per week    Comment: 3glasses wine daily     Social History     Substance and Sexual Activity   Drug Use No     Social History     Tobacco Use   Smoking Status Former    Current packs/day: 1.00    Types: Cigarettes   Smokeless Tobacco Never   Tobacco Comments    age [age]      Family History   Problem Relation Age of Onset    Lung cancer Mother     Hypertension Mother     Cancer Mother     Cerebral aneurysm Father         bleed    Alcohol abuse Father     Cirrhosis Father     Other Father         had fallen    Other Sister         unkownn cause     Other Sister         in MVA    No Known Problems Daughter     No Known Problems Daughter     No Known Problems Maternal Grandmother     No Known Problems Maternal Grandfather     No Known Problems Paternal  "Grandmother     No Known Problems Paternal Grandfather     Pneumonia Brother         infancy of pneumonia    No Known Problems Son        Meds/Allergies       Current Outpatient Medications:     Acetaminophen (TYLENOL ARTHRITIS PAIN PO)    acetaminophen (TYLENOL) 325 mg tablet    ammonium lactate (LAC-HYDRIN) 12 % lotion    Ascorbic Acid (vitamin C) 1000 MG tablet    Cholecalciferol (VITAMIN D3) 50 MCG (2000 UT) TABS    diltiazem (DILACOR XR) 120 MG 24 hr capsule    fluticasone (FLONASE) 50 mcg/act nasal spray    multivitamin (THERAGRAN) TABS    omeprazole (PriLOSEC) 20 mg delayed release capsule    vitamin B-12 (CYANOCOBALAMIN) 100 MCG TABS    dabigatran etexilate (PRADAXA) 150 mg capsu    ferrous gluconate (FERGON) 324 mg tablet    Tuberculin-Allergy Syringes (ALLERGY SYRINGE 1CC/27GX1/2\") 27G X 1/2\" 1 ML MISC    Current Facility-Administered Medications:     lactated ringers infusion, 125 mL/hr, Intravenous, Continuous, 125 mL/hr at 12/04/24 1230    Allergies   Allergen Reactions    Cat Hair Extract     Dust Mite Extract     Iv Dye  [Iodinated Contrast Media] Hives     Category: Allergy;     Other      Possible reaction to chlorhexadine, but this occurred days after and in other areas of the body than just the area prepped    Pollen Extract      Seasonal allergies       Objective     /74   Pulse (!) 52   Temp (!) 97.2 °F (36.2 °C) (Temporal)   Resp 18   SpO2 98%       PHYSICAL EXAM    Gen: NAD  Head: NCAT  CV: RRR  CHEST: Clear  ABD: soft, NT/ND  EXT: no edema      ASSESSMENT/PLAN:  This is a 79 y.o. year old female here for EGD, and she is stable and optimized for her procedure.        "

## 2024-12-06 ENCOUNTER — RESULTS FOLLOW-UP (OUTPATIENT)
Dept: GASTROENTEROLOGY | Facility: CLINIC | Age: 79
End: 2024-12-06

## 2024-12-06 PROCEDURE — 88305 TISSUE EXAM BY PATHOLOGIST: CPT | Performed by: STUDENT IN AN ORGANIZED HEALTH CARE EDUCATION/TRAINING PROGRAM

## 2024-12-27 ENCOUNTER — APPOINTMENT (EMERGENCY)
Dept: RADIOLOGY | Facility: HOSPITAL | Age: 79
End: 2024-12-27
Payer: COMMERCIAL

## 2024-12-27 ENCOUNTER — HOSPITAL ENCOUNTER (EMERGENCY)
Facility: HOSPITAL | Age: 79
Discharge: HOME | End: 2024-12-27
Attending: EMERGENCY MEDICINE
Payer: COMMERCIAL

## 2024-12-27 VITALS
HEART RATE: 52 BPM | SYSTOLIC BLOOD PRESSURE: 153 MMHG | TEMPERATURE: 97.3 F | OXYGEN SATURATION: 95 % | RESPIRATION RATE: 18 BRPM | DIASTOLIC BLOOD PRESSURE: 70 MMHG

## 2024-12-27 DIAGNOSIS — S51.811A LACERATION OF RIGHT FOREARM, INITIAL ENCOUNTER: ICD-10-CM
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
## 2024-12-27 PROCEDURE — 99283 EMERGENCY DEPT VISIT LOW MDM: CPT

## 2024-12-27 PROCEDURE — 12002 RPR S/N/AX/GEN/TRNK2.6-7.5CM: CPT | Performed by: PHYSICIAN ASSISTANT

## 2024-12-27 PROCEDURE — 99284 EMERGENCY DEPT VISIT MOD MDM: CPT | Performed by: PHYSICIAN ASSISTANT

## 2024-12-27 PROCEDURE — 73090 X-RAY EXAM OF FOREARM: CPT

## 2024-12-27 RX ORDER — HYDROCODONE BITARTRATE AND ACETAMINOPHEN 5; 325 MG/1; MG/1
1 TABLET ORAL ONCE
Refills: 0 | Status: COMPLETED | OUTPATIENT
Start: 2024-12-27 | End: 2024-12-27

## 2024-12-27 RX ORDER — AMOXICILLIN AND CLAVULANATE POTASSIUM 500; 125 MG/1; MG/1
1 TABLET, FILM COATED ORAL EVERY 12 HOURS SCHEDULED
Qty: 14 TABLET | Refills: 0 | Status: SHIPPED | OUTPATIENT
Start: 2024-12-27 | End: 2025-01-03

## 2024-12-27 RX ORDER — HYDROCODONE BITARTRATE AND ACETAMINOPHEN 5; 325 MG/1; MG/1
1 TABLET ORAL EVERY 6 HOURS PRN
Qty: 8 TABLET | Refills: 0 | Status: SHIPPED | OUTPATIENT
Start: 2024-12-27

## 2024-12-27 RX ORDER — LIDOCAINE HYDROCHLORIDE 10 MG/ML
10 INJECTION, SOLUTION EPIDURAL; INFILTRATION; INTRACAUDAL; PERINEURAL ONCE
Status: COMPLETED | OUTPATIENT
Start: 2024-12-27 | End: 2024-12-27

## 2024-12-27 RX ORDER — AMOXICILLIN AND CLAVULANATE POTASSIUM 500; 125 MG/1; MG/1
1 TABLET, FILM COATED ORAL ONCE
Status: COMPLETED | OUTPATIENT
Start: 2024-12-27 | End: 2024-12-27

## 2024-12-27 RX ORDER — LIDOCAINE HYDROCHLORIDE 10 MG/ML
INJECTION, SOLUTION EPIDURAL; INFILTRATION; INTRACAUDAL; PERINEURAL
Status: COMPLETED
Start: 2024-12-27 | End: 2024-12-27

## 2024-12-27 RX ADMIN — LIDOCAINE HYDROCHLORIDE 10 ML: 10 INJECTION, SOLUTION EPIDURAL; INFILTRATION; INTRACAUDAL; PERINEURAL at 19:18

## 2024-12-27 RX ADMIN — AMOXICILLIN AND CLAVULANATE POTASSIUM 1 TABLET: 500; 125 TABLET, FILM COATED ORAL at 18:32

## 2024-12-27 RX ADMIN — LIDOCAINE HYDROCHLORIDE 10 ML: 10 INJECTION, SOLUTION EPIDURAL; INFILTRATION; INTRACAUDAL; PERINEURAL at 18:43

## 2024-12-27 RX ADMIN — HYDROCODONE BITARTRATE AND ACETAMINOPHEN 1 TABLET: 5; 325 TABLET ORAL at 19:48

## 2024-12-27 NOTE — ED PROVIDER NOTES
Time reflects when diagnosis was documented in both MDM as applicable and the Disposition within this note       Time User Action Codes Description Comment    12/27/2024  7:46 PM Lion Gorman Add [W55.01XA] Cat bite, initial encounter     12/27/2024  7:46 PM Lion Gorman Add [S51.811A] Laceration of right forearm, initial encounter           ED Disposition       ED Disposition   Discharge    Condition   Stable    Date/Time   Fri Dec 27, 2024  7:42 PM    Comment   Margarito Nieves discharge to home/self care.                   Assessment & Plan       Medical Decision Making  Is an 79-year-old female patient who was attacked by her cat while trying to separate 2 of them.  Leaving a 6 cm laceration to her right forearm ventral surface does not appear to have tendon or nerve involvement.    Problems Addressed:  Cat bite, initial encounter: acute illness or injury     Details: Patient's wound was copiously washed out with saline debrided and then flushed again started on Augmentin tetanus was up-to-date.  Will recheck with Dr. Garibay on Monday  Laceration of right forearm, initial encounter: acute illness or injury     Details: Send the picture of the wound to Dr. Garibay via epic chat explained that I plan to close it with Penrose drain in place to try to avoid infection.  He agreed I did send him the follow-up closure picture which he approved of.  Patient was dressed and will follow-up with Dr. Garibay on Monday and not to remove the dressing was given prescriptions for Vicodin and Augmentin.  I did adjust the Augmentin dose because of the patient's creatinine clearance and her history of kidney disease.    Amount and/or Complexity of Data Reviewed  External Data Reviewed: labs and notes.     Details: I did review labs and notes for comparison and to calculate a creatinine clearance  Radiology: ordered and independent interpretation performed.     Details: I personally interpreted the x-ray  there is no fracture  Discussion of management or test interpretation with external provider(s): Using joint decision-making with the patient and Dr. Garibay.  Patient be discharged with wound dressed in a proper fashion and was strongly advised not to remove the dressing until seen by Dr. Garibay.  To return here with redness warmth questions comments or concerns and given strict return precautions.  She is also advised to  her prescriptions tomorrow.  She verbalized understanding and agreement as did her family member in the room.    Risk  Prescription drug management.        ED Course as of 12/27/24 1959   Fri Dec 27, 2024   1825 Creatinine clearance 37   1952 The wound on the right lower leg was very superficial it was cleaned and a Band-Aid was applied.       Medications   lidocaine (PF) (XYLOCAINE-MPF) 1 % injection 10 mL (10 mL Infiltration Given 12/27/24 1918)   amoxicillin-clavulanate (AUGMENTIN) 500-125 mg per tablet 1 tablet (1 tablet Oral Given 12/27/24 1832)   HYDROcodone-acetaminophen (NORCO) 5-325 mg per tablet 1 tablet (1 tablet Oral Given 12/27/24 1948)       ED Risk Strat Scores                          SBIRT 20yo+      Flowsheet Row Most Recent Value   Initial Alcohol Screen: US AUDIT-C     1. How often do you have a drink containing alcohol? 0 Filed at: 12/27/2024 1821   2. How many drinks containing alcohol do you have on a typical day you are drinking?  0 Filed at: 12/27/2024 1821   3a. Male UNDER 65: How often do you have five or more drinks on one occasion? 0 Filed at: 12/27/2024 1821   3b. FEMALE Any Age, or MALE 65+: How often do you have 4 or more drinks on one occassion? 0 Filed at: 12/27/2024 1821   Audit-C Score 0 Filed at: 12/27/2024 1821   LEONARD: How many times in the past year have you...    Used an illegal drug or used a prescription medication for non-medical reasons? Never Filed at: 12/27/2024 1821                            History of Present Illness       Chief Complaint    Patient presents with    Animal Bite     Cat bite to right forearm         Past Medical History:   Diagnosis Date    Cellulitis of right hand 05/10/2022    Closed fracture of right proximal humerus 02/15/2020    GERD (gastroesophageal reflux disease)     NSTEMI (non-ST elevated myocardial infarction) ([facility]) 02/16/2020    due to demand ischemia post fall/fracture    Osteopenia 10/23/2017    PAF (paroxysmal atrial fibrillation)     Syncope       Past Surgical History:   Procedure Laterality Date    APPENDECTOMY      CATARACT EXTRACTION      LAPAROSCOPIC APPENDECTOMY      incidental     PA TENDON SHEATH INCISION Left 2/26/2019    Procedure: LONG FINGER TRIGGER FINGER RELEASE;  Surgeon: Ash Louis MD;  Location: [facility];  Service: Orthopedics    TONSILLECTOMY      TUBAL LIGATION        Family History   Problem Relation Age of Onset    Lung cancer Mother     Hypertension Mother     Cancer Mother     Cerebral aneurysm Father         bleed    Alcohol abuse Father     Cirrhosis Father     Other Father         had fallen    Other Sister         unkownn cause     Other Sister         in MVA    No Known Problems Daughter     No Known Problems Daughter     No Known Problems Maternal Grandmother     No Known Problems Maternal Grandfather     No Known Problems Paternal Grandmother     No Known Problems Paternal Grandfather     Pneumonia Brother         infancy of pneumonia    No Known Problems Son       Social History     Tobacco Use    Smoking status: Former     Current packs/day: 1.00     Types: Cigarettes    Smokeless tobacco: Never    Tobacco comments:     age [age]    Vaping Use    Vaping status: Never Used   Substance Use Topics    Alcohol use: Yes     Alcohol/week: 10.0 standard drinks of alcohol     Types: 10 Glasses of wine per week     Comment: 3glasses wine daily    Drug use: No      E-Cigarette/Vaping    E-Cigarette Use Never User       E-Cigarette/Vaping Substances    Nicotine No     THC No     CBD No      Flavoring No     Other No     Unknown No       I have reviewed and agree with the history as documented.     This is an 79-year-old female patient who try to separate 2 of her male fighting and was bitten and scratched her right forearm.  She sustained approximately 6 cm open laceration.  Both cats are known to her and are vaccinated.  Her last tetanus is 2022.  There is no numbness or tingling she is not having any difficulty moving her fingers or wrist.  There is no weakness.  She is right-hand dominant.  This will require copious washout followed by a loose closure to try to avoid infection.  Her most recent creatinine clearance calculated today was 37 so she received Augmentin 500 mg per 125 every 12 hours being given her first 1 tonight.  Also has a very small wound from the cat on her right ankle does not require closure.  Bleeding is controlled.        Review of Systems   Constitutional:  Negative for diaphoresis, fatigue and fever.   HENT:  Negative for congestion, ear pain, nosebleeds and sore throat.    Eyes:  Negative for photophobia, pain, discharge and visual disturbance.   Respiratory:  Negative for cough, choking, chest tightness, shortness of breath and wheezing.    Cardiovascular:  Negative for chest pain and palpitations.   Gastrointestinal:  Negative for abdominal distention, abdominal pain, diarrhea and vomiting.   Genitourinary:  Negative for dysuria, flank pain and frequency.   Musculoskeletal:  Negative for back pain, gait problem and joint swelling.   Skin:  Positive for wound. Negative for color change and rash.   Neurological:  Negative for dizziness, syncope and headaches.   Psychiatric/Behavioral:  Negative for behavioral problems and confusion. The patient is not nervous/anxious.    All other systems reviewed and are negative.          Objective       ED Triage Vitals   Temperature Pulse Blood Pressure Respirations SpO2 Patient Position - Orthostatic VS   12/27/24 1821 12/27/24 1821  12/27/24 1821 12/27/24 1821 12/27/24 1821 12/27/24 1821   (!) 97.3 °F (36.3 °C) 74 (!) 193/75 18 97 % Sitting      Temp src Heart Rate Source BP Location FiO2 (%) Pain Score    -- 12/27/24 1821 12/27/24 1821 -- 12/27/24 1948     Monitor Left arm  9      Vitals      Date and Time Temp Pulse SpO2 Resp BP Pain Score FACES Pain Rating User   12/27/24 1948 -- -- -- -- -- 9 -- KB   12/27/24 1900 -- 52 95 % 18 153/70 -- -- KB   12/27/24 1821 97.3 °F (36.3 °C) 74 97 % 18 193/75 -- -- KB            Physical Exam  Vitals and nursing note reviewed.   Constitutional:       General: She is not in acute distress.     Appearance: Normal appearance. She is well-developed. She is not ill-appearing, toxic-appearing or diaphoretic.   HENT:      Head: Normocephalic and atraumatic.   Eyes:      Conjunctiva/sclera: Conjunctivae normal.   Cardiovascular:      Rate and Rhythm: Normal rate and regular rhythm.   Pulmonary:      Effort: Pulmonary effort is normal. No respiratory distress.   Abdominal:      Palpations: Abdomen is soft.      Tenderness: There is no abdominal tenderness.   Musculoskeletal:         General: No swelling.        Arms:       Cervical back: Neck supple.   Skin:     General: Skin is warm and dry.      Capillary Refill: Capillary refill takes less than 2 seconds.   Neurological:      General: No focal deficit present.      Mental Status: She is alert and oriented to person, place, and time. Mental status is at baseline.   Psychiatric:         Mood and Affect: Mood normal.         Results Reviewed       None            XR forearm 2 views RIGHT   Final Interpretation by Jostin Ray MD (12/27 1913)      Soft tissue laceration and soft tissue gas on the volar aspect of the forearm.      No acute osseous abnormality.         Computerized Assisted Algorithm (CAA) may have been used to analyze all applicable images.            Workstation performed: [id number]             Universal Protocol:  Consent: Verbal consent  "obtained.  Risks and benefits: risks, benefits and alternatives were discussed  Consent given by: patient  Time out: Immediately prior to procedure a \"time out\" was called to verify the correct patient, procedure, equipment, support staff and site/side marked as required.  Patient understanding: patient states understanding of the procedure being performed  Patient identity confirmed: verbally with patient  Laceration repair    Date/Time: 12/27/2024 7:43 PM    Performed by: Lion Xavier PA-C  Authorized by: Lion Xavier PA-C  Location: right forearm.  Laceration length: 6 cm  Contamination: The wound is contaminated.  Foreign bodies: no foreign bodies  Tendon involvement: none  Nerve involvement: none  Vascular damage: no  Anesthesia: local infiltration    Anesthesia:  Local Anesthetic: lidocaine 1% without epinephrine  Anesthetic total: 15 mL    Sedation:  Patient sedated: no        Procedure Details:  Preparation: Patient was prepped and draped in the usual sterile fashion.  Irrigation solution: saline  Irrigation method: syringe  Amount of cleaning: extensive  Debridement: extensive  Degree of undermining: extensive  Skin closure: 4-0 nylon  Number of sutures: 6  Technique: simple  Approximation: close  Approximation difficulty: complex  Dressing: 4x4 sterile gauze, gauze roll and pressure dressing (Xeroform)  Patient tolerance: patient tolerated the procedure well with no immediate complications  Comments: Penrose drain placed will follow-up with Dr. Garibay on Monday I spoke to him personally          ED Medication and Procedure Management   Prior to Admission Medications   Prescriptions Last Dose Informant Patient Reported? Taking?   Acetaminophen (TYLENOL ARTHRITIS PAIN PO)   Yes No   Sig: Take by mouth if needed   Ascorbic Acid (vitamin C) 1000 MG tablet   Yes No   Sig: Take 1,000 mg by mouth daily   Cholecalciferol (VITAMIN D3) 50 MCG (2000 UT) TABS  Self Yes No   Sig: Take 2,000 Units by " "mouth daily   Tuberculin-Allergy Syringes (ALLERGY SYRINGE 1CC/27GX1/2\") 27G X 1/2\" 1 ML MISC   Yes No   Sig: Use Receives allergy injections once a month through allergist   acetaminophen (TYLENOL) 325 mg tablet  Self Yes No   Sig: Take 650 mg by mouth every 6 (six) hours as needed for mild pain   ammonium lactate (LAC-HYDRIN) 12 % lotion   No No   Sig: Apply topically 2 (two) times a day as needed for dry skin   dabigatran etexilate (PRADAXA) 150 mg capsu   No No   Sig: Take 1 capsule (150 mg total) by mouth 2 (two) times a day   diltiazem (DILACOR XR) 120 MG 24 hr capsule   No No   Sig: Take 1 capsule (120 mg total) by mouth daily   ferrous gluconate (FERGON) 324 mg tablet   Yes No   Sig: Take 324 mg by mouth daily with breakfast   fluticasone (FLONASE) 50 mcg/act nasal spray  Self Yes No   Sig: into each nostril   multivitamin (THERAGRAN) TABS  Self Yes No   Sig: Take 1 tablet by mouth in the morning.   omeprazole (PriLOSEC) 20 mg delayed release capsule   No No   Sig: take 1 capsule by mouth once daily   omeprazole (PriLOSEC) 40 MG capsule   No No   Sig: Take 1 capsule (40 mg total) by mouth daily   vitamin B-12 (CYANOCOBALAMIN) 100 MCG TABS   Yes No   Sig: Take 50 mcg by mouth daily      Facility-Administered Medications: None     Discharge Medication List as of 12/27/2024  7:52 PM        START taking these medications    Details   amoxicillin-clavulanate (Augmentin) 500-125 mg per tablet Take 1 tablet by mouth every 12 (twelve) hours for 7 days, Starting Fri 12/27/2024, Until Fri 1/3/2025, Normal      HYDROcodone-acetaminophen (Norco) 5-325 mg per tablet Take 1 tablet by mouth every 6 (six) hours as needed for pain Max Daily Amount: 4 tablets, Starting Fri 12/27/2024, Normal           CONTINUE these medications which have NOT CHANGED    Details   Acetaminophen (TYLENOL ARTHRITIS PAIN PO) Take by mouth if needed, Historical Med      acetaminophen (TYLENOL) 325 mg tablet Take 650 mg by mouth every 6 (six) hours " "as needed for mild pain, Historical Med      ammonium lactate (LAC-HYDRIN) 12 % lotion Apply topically 2 (two) times a day as needed for dry skin, Starting Thu 3/30/2023, Normal      Ascorbic Acid (vitamin C) 1000 MG tablet Take 1,000 mg by mouth daily, Historical Med      Cholecalciferol (VITAMIN D3) 50 MCG (2000 UT) TABS Take 2,000 Units by mouth daily, Historical Med      dabigatran etexilate (PRADAXA) 150 mg capsu Take 1 capsule (150 mg total) by mouth 2 (two) times a day, Starting Mon 9/30/2024, Normal      diltiazem (DILACOR XR) 120 MG 24 hr capsule Take 1 capsule (120 mg total) by mouth daily, Starting Wed 9/25/2024, Normal      ferrous gluconate (FERGON) 324 mg tablet Take 324 mg by mouth daily with breakfast, Historical Med      fluticasone (FLONASE) 50 mcg/act nasal spray into each nostril, Historical Med      multivitamin (THERAGRAN) TABS Take 1 tablet by mouth in the morning., Historical Med      !! omeprazole (PriLOSEC) 20 mg delayed release capsule take 1 capsule by mouth once daily, Starting Tue 10/22/2024, Normal      !! omeprazole (PriLOSEC) 40 MG capsule Take 1 capsule (40 mg total) by mouth daily, Starting Wed 12/4/2024, Normal      Tuberculin-Allergy Syringes (ALLERGY SYRINGE 1CC/27GX1/2\") 27G X 1/2\" 1 ML MISC Use Receives allergy injections once a month through allergist, Historical Med      vitamin B-12 (CYANOCOBALAMIN) 100 MCG TABS Take 50 mcg by mouth daily, Historical Med       !! - Potential duplicate medications found. Please discuss with provider.          ED SEPSIS DOCUMENTATION   Time reflects when diagnosis was documented in both MDM as applicable and the Disposition within this note       Time User Action Codes Description Comment    12/27/2024  7:46 PM Lion Xavier [W55.01XA] Cat bite, initial encounter     12/27/2024  7:46 PM Lion Xavier [S51.811A] Laceration of right forearm, initial encounter                  Lion Xavier PA-C  12/27/24 1959    "

## 2024-12-28 NOTE — DISCHARGE INSTRUCTIONS
Leave your dressing in place until you see Dr. Garibay and let him remove it.    Call Dr. Garibay on Monday and follow-up on Monday without fail    Return here with any worsening symptoms questions comments or concerns such as increased pain not relieved by your medications fever chills or redness.

## 2024-12-30 ENCOUNTER — OFFICE VISIT (OUTPATIENT)
Dept: SURGERY | Facility: CLINIC | Age: 79
End: 2024-12-30
Payer: COMMERCIAL

## 2024-12-30 ENCOUNTER — TELEPHONE (OUTPATIENT)
Dept: OTHER | Facility: OTHER | Age: 79
End: 2024-12-30

## 2024-12-30 VITALS
TEMPERATURE: 98.5 F | WEIGHT: 124 LBS | HEIGHT: 64 IN | BODY MASS INDEX: 21.17 KG/M2 | HEART RATE: 52 BPM | OXYGEN SATURATION: 99 %

## 2024-12-30 DIAGNOSIS — S51.811A FOREARM LACERATION, RIGHT, INITIAL ENCOUNTER: Primary | ICD-10-CM

## 2024-12-30 DIAGNOSIS — S51.811A LACERATION OF RIGHT FOREARM, INITIAL ENCOUNTER: ICD-10-CM
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
## 2024-12-30 PROCEDURE — 99203 OFFICE O/P NEW LOW 30 MIN: CPT | Performed by: SURGERY

## 2024-12-30 NOTE — ASSESSMENT & PLAN NOTE
The patient is an 79-year-old female presenting status post ER evaluation on December 27, 2024 after incurring a right forearm laceration from a pet cat.  She underwent wound debridement primary closure over Penrose drain.    Today in the office patient voiced no new complaints.  She incidentally notes the presence of a superficial right leg skin tear.    On physical examination she is well-appearing.  Pleasant competent reliable as a historian.  She is accompanied by her  today.  Examination of the right forearm wound reveals it to be clean dry and intact.  Multiple simple interrupted sutures of nylon are present.  Penrose drain was removed.  The wound cleansed and redressed with topical antibiotics and a nonadherent dressing.    Patient appears clinically stable from surgical perspective.  Patient and  educated regarding wound care.    Patient and  educated regarding signs of wound infection that would warrant reevaluation at the [facility] emergency department to include increasing redness drainage swelling pain or fever.    All questions answered to the satisfaction of the patient.  Look forward to seeing her back in a week's time or sooner on an as-needed basis.

## 2024-12-30 NOTE — TELEPHONE ENCOUNTER
Patient needs an appointment. Please contact the patient to schedule an appointment.    NEW PATIENT: has a drain that needs to be removed.

## 2024-12-30 NOTE — PROGRESS NOTES
Name: Margarito Nieves      : 1945      MRN: [id number]  Encounter Provider: Boo Garibay MD  Encounter Date: 2024   Encounter department: [facility]  :  Assessment & Plan  Cat bite, initial encounter    Orders:    Ambulatory Referral to General Surgery    Laceration of right forearm, initial encounter    Orders:    Ambulatory Referral to General Surgery    Forearm laceration, right, initial encounter  The patient is an 79-year-old female presenting status post ER evaluation on 2024 after incurring a right forearm laceration from a pet cat.  She underwent wound debridement primary closure over Penrose drain.    Today in the office patient voiced no new complaints.  She incidentally notes the presence of a superficial right leg skin tear.    On physical examination she is well-appearing.  Pleasant competent reliable as a historian.  She is accompanied by her  today.  Examination of the right forearm wound reveals it to be clean dry and intact.  Multiple simple interrupted sutures of nylon are present.  Penrose drain was removed.  The wound cleansed and redressed with topical antibiotics and a nonadherent dressing.    Patient appears clinically stable from surgical perspective.  Patient and  educated regarding wound care.    Patient and  educated regarding signs of wound infection that would warrant reevaluation at the [facility] emergency department to include increasing redness drainage swelling pain or fever.    All questions answered to the satisfaction of the patient.  Look forward to seeing her back in a week's time or sooner on an as-needed basis.             History of Present Illness   HPI  Margarito Nieves is a 79 y.o. female who presents for drain removal that was placed on Friday after her wound was clean and dressed due to a cat bite. Pt states theres aching throbbing pain with movement, drainage with foul smell, and she  suffers from occasional shivers. Pt takes tylenol 650 mg twice a day and it seems to help. Pt is currently on Augmentin and denies any side effects. Pt has allergies to iodine and chlorhexidine. No fevers/chills. Amilia.O,MA  History obtained from: patient    Review of Systems   Constitutional:  Negative for chills and fever.   HENT:  Negative for ear pain and sore throat.    Eyes:  Negative for pain and visual disturbance.   Respiratory:  Negative for cough and shortness of breath.    Cardiovascular:  Negative for chest pain and palpitations.   Gastrointestinal:  Negative for abdominal pain and vomiting.   Genitourinary:  Negative for dysuria and hematuria.   Musculoskeletal:  Negative for arthralgias and back pain.   Skin:  Negative for color change and rash.   Neurological:  Negative for seizures and syncope.   All other systems reviewed and are negative.    Pertinent Medical History          Medical History Reviewed by provider this encounter:     .  Past Medical History   Past Medical History:   Diagnosis Date    Cellulitis of right hand 05/10/2022    Closed fracture of right proximal humerus 02/15/2020    GERD (gastroesophageal reflux disease)     NSTEMI (non-ST elevated myocardial infarction) ([facility]) 02/16/2020    due to demand ischemia post fall/fracture    Osteopenia 10/23/2017    PAF (paroxysmal atrial fibrillation)     Syncope      Past Surgical History:   Procedure Laterality Date    APPENDECTOMY      CATARACT EXTRACTION      LAPAROSCOPIC APPENDECTOMY      incidental     [place] TENDON SHEATH INCISION Left 2/26/2019    Procedure: LONG FINGER TRIGGER FINGER RELEASE;  Surgeon: Ash Louis MD;  Location: [facility];  Service: Orthopedics    TONSILLECTOMY      TUBAL LIGATION       Family History   Problem Relation Age of Onset    Lung cancer Mother     Hypertension Mother     Cancer Mother     Cerebral aneurysm Father         bleed    Alcohol abuse Father     Cirrhosis Father     Other Father         had  "fallen    Other Sister         unkownn cause     Other Sister         in MVA    No Known Problems Daughter     No Known Problems Daughter     No Known Problems Maternal Grandmother     No Known Problems Maternal Grandfather     No Known Problems Paternal Grandmother     No Known Problems Paternal Grandfather     Pneumonia Brother         infancy of pneumonia    No Known Problems Son       reports that she has quit smoking. Her smoking use included cigarettes. She has never used smokeless tobacco. She reports current alcohol use of about 10.0 standard drinks of alcohol per week. She reports that she does not use drugs.  Current Outpatient Medications on File Prior to Visit   Medication Sig Dispense Refill    Acetaminophen (TYLENOL ARTHRITIS PAIN PO) Take by mouth if needed      ammonium lactate (LAC-HYDRIN) 12 % lotion Apply topically 2 (two) times a day as needed for dry skin 225 g 1    amoxicillin-clavulanate (Augmentin) 500-125 mg per tablet Take 1 tablet by mouth every 12 (twelve) hours for 7 days 14 tablet 0    Ascorbic Acid (vitamin C) 1000 MG tablet Take 1,000 mg by mouth daily      Cholecalciferol (VITAMIN D3) 50 MCG (2000 UT) TABS Take 2,000 Units by mouth daily      dabigatran etexilate (PRADAXA) 150 mg capsu Take 1 capsule (150 mg total) by mouth 2 (two) times a day 180 capsule 3    diltiazem (DILACOR XR) 120 MG 24 hr capsule Take 1 capsule (120 mg total) by mouth daily 30 capsule 5    ferrous gluconate (FERGON) 324 mg tablet Take 324 mg by mouth daily with breakfast      fluticasone (FLONASE) 50 mcg/act nasal spray into each nostril      multivitamin (THERAGRAN) TABS Take 1 tablet by mouth in the morning.      omeprazole (PriLOSEC) 40 MG capsule Take 1 capsule (40 mg total) by mouth daily 30 capsule 11    Tuberculin-Allergy Syringes (ALLERGY SYRINGE 1CC/27GX1/2\") 27G X 1/2\" 1 ML MISC Use Receives allergy injections once a month through allergist      vitamin B-12 (CYANOCOBALAMIN) 100 MCG TABS Take 50 mcg " by mouth daily      acetaminophen (TYLENOL) 325 mg tablet Take 650 mg by mouth every 6 (six) hours as needed for mild pain (Patient not taking: Reported on 12/30/2024)      HYDROcodone-acetaminophen (Norco) 5-325 mg per tablet Take 1 tablet by mouth every 6 (six) hours as needed for pain Max Daily Amount: 4 tablets (Patient not taking: Reported on 12/30/2024) 8 tablet 0    omeprazole (PriLOSEC) 20 mg delayed release capsule take 1 capsule by mouth once daily (Patient not taking: Reported on 12/30/2024) 90 capsule 0     No current facility-administered medications on file prior to visit.     Allergies   Allergen Reactions    Cat Hair Extract     Dust Mite Extract     Iv Dye  [Iodinated Contrast Media] Hives     Category: Allergy;     Other      Possible reaction to chlorhexadine, but this occurred days after and in other areas of the body than just the area prepped    Pollen Extract      Seasonal allergies      Current Outpatient Medications on File Prior to Visit   Medication Sig Dispense Refill    Acetaminophen (TYLENOL ARTHRITIS PAIN PO) Take by mouth if needed      ammonium lactate (LAC-HYDRIN) 12 % lotion Apply topically 2 (two) times a day as needed for dry skin 225 g 1    amoxicillin-clavulanate (Augmentin) 500-125 mg per tablet Take 1 tablet by mouth every 12 (twelve) hours for 7 days 14 tablet 0    Ascorbic Acid (vitamin C) 1000 MG tablet Take 1,000 mg by mouth daily      Cholecalciferol (VITAMIN D3) 50 MCG (2000 UT) TABS Take 2,000 Units by mouth daily      dabigatran etexilate (PRADAXA) 150 mg capsu Take 1 capsule (150 mg total) by mouth 2 (two) times a day 180 capsule 3    diltiazem (DILACOR XR) 120 MG 24 hr capsule Take 1 capsule (120 mg total) by mouth daily 30 capsule 5    ferrous gluconate (FERGON) 324 mg tablet Take 324 mg by mouth daily with breakfast      fluticasone (FLONASE) 50 mcg/act nasal spray into each nostril      multivitamin (THERAGRAN) TABS Take 1 tablet by mouth in the morning.       "omeprazole (PriLOSEC) 40 MG capsule Take 1 capsule (40 mg total) by mouth daily 30 capsule 11    Tuberculin-Allergy Syringes (ALLERGY SYRINGE 1CC/27GX1/2\") 27G X 1/2\" 1 ML MISC Use Receives allergy injections once a month through allergist      vitamin B-12 (CYANOCOBALAMIN) 100 MCG TABS Take 50 mcg by mouth daily      acetaminophen (TYLENOL) 325 mg tablet Take 650 mg by mouth every 6 (six) hours as needed for mild pain (Patient not taking: Reported on 12/30/2024)      HYDROcodone-acetaminophen (Norco) 5-325 mg per tablet Take 1 tablet by mouth every 6 (six) hours as needed for pain Max Daily Amount: 4 tablets (Patient not taking: Reported on 12/30/2024) 8 tablet 0    omeprazole (PriLOSEC) 20 mg delayed release capsule take 1 capsule by mouth once daily (Patient not taking: Reported on 12/30/2024) 90 capsule 0     No current facility-administered medications on file prior to visit.      Social History     Tobacco Use    Smoking status: Former     Current packs/day: 1.00     Types: Cigarettes    Smokeless tobacco: Never    Tobacco comments:     age [age]    Vaping Use    Vaping status: Never Used   Substance and Sexual Activity    Alcohol use: Yes     Alcohol/week: 10.0 standard drinks of alcohol     Types: 10 Glasses of wine per week     Comment: 3glasses wine daily    Drug use: No    Sexual activity: Not Currently        Objective   Pulse (!) 52   Temp 98.5 °F (36.9 °C) (Temporal)   Ht 5' 4\" (1.626 m)   Wt 56.2 kg (124 lb)   SpO2 99%   BMI 21.28 kg/m²      Physical Exam  Vitals and nursing note reviewed.   Constitutional:       General: She is not in acute distress.     Appearance: She is well-developed.   HENT:      Head: Normocephalic and atraumatic.   Eyes:      Conjunctiva/sclera: Conjunctivae normal.   Cardiovascular:      Rate and Rhythm: Normal rate and regular rhythm.      Heart sounds: No murmur heard.  Pulmonary:      Effort: Pulmonary effort is normal. No respiratory distress.      Breath sounds: " Normal breath sounds.   Abdominal:      Palpations: Abdomen is soft.      Tenderness: There is no abdominal tenderness.   Musculoskeletal:         General: No swelling.      Cervical back: Neck supple.   Skin:     General: Skin is warm and dry.      Capillary Refill: Capillary refill takes less than 2 seconds.      Comments: 6 cm right forearm laceration primarily repaired with multiple simple interrupted nylon suture   Neurological:      Mental Status: She is alert.   Psychiatric:         Mood and Affect: Mood normal.         Administrative Statements   I have spent a total time of 20 minutes in caring for this patient on the day of the visit/encounter including Instructions for management. Topics discussed with the patient / family include symptom assessment and management.

## 2025-01-07 NOTE — PROGRESS NOTES
Name: Margarito Nieves      : 1945      MRN: [id number]  Encounter Provider: Farhad Landa PA-C  Encounter Date: 2025   Encounter department: [facility] GENERAL SURGERY [place]  :  Assessment & Plan  Forearm laceration, right, initial encounter  The laceration remains approximated without signs of infection. There is clear serous drainage from the laceration itself. The skin edges have not adhered and remain at risk for dehiscence for which she was advised to stop using any ointments to the area and allow 1 additional week before considering suture removal. She states she's been using Santyl to the area and she was advised not to use this product. Wound care instructions provided. Questions answered, agreeable to the plan and to call sooner with concerns.              History of Present Illness   HPI  Margarito Nieves is a 79 y.o. female who presents for a 1 week arm wound check/possible suture removal. Pt states theres yellow drainage but no foul smell and she sometimes suffers from a burning pain that she treats with tylenol and it helps. For wound care patient washes her wound with soap and water, she ran out of neosporin so she applies santyl ointment with a non adherent dressing and a gauze roll. ROSELINE Andres  History obtained from: patient    Review of Systems   All other systems reviewed and are negative.    Past Medical History   Past Medical History:   Diagnosis Date    Cellulitis of right hand 05/10/2022    Closed fracture of right proximal humerus 02/15/2020    GERD (gastroesophageal reflux disease)     NSTEMI (non-ST elevated myocardial infarction) ([facility]) 2020    due to demand ischemia post fall/fracture    Osteopenia 10/23/2017    PAF (paroxysmal atrial fibrillation)     Syncope      Past Surgical History:   Procedure Laterality Date    APPENDECTOMY      CATARACT EXTRACTION      LAPAROSCOPIC APPENDECTOMY      incidental     [place] TENDON SHEATH INCISION Left 2019     Procedure: LONG FINGER TRIGGER FINGER RELEASE;  Surgeon: Ash Louis MD;  Location: [facility];  Service: Orthopedics    TONSILLECTOMY      TUBAL LIGATION       Family History   Problem Relation Age of Onset    Lung cancer Mother     Hypertension Mother     Cancer Mother     Cerebral aneurysm Father         bleed    Alcohol abuse Father     Cirrhosis Father     Other Father         had fallen    Other Sister         unkownn cause     Other Sister         in MVA    No Known Problems Daughter     No Known Problems Daughter     No Known Problems Maternal Grandmother     No Known Problems Maternal Grandfather     No Known Problems Paternal Grandmother     No Known Problems Paternal Grandfather     Pneumonia Brother         infancy of pneumonia    No Known Problems Son       reports that she has quit smoking. Her smoking use included cigarettes. She has never used smokeless tobacco. She reports current alcohol use of about 10.0 standard drinks of alcohol per week. She reports that she does not use drugs.  Current Outpatient Medications on File Prior to Visit   Medication Sig Dispense Refill    Acetaminophen (TYLENOL ARTHRITIS PAIN PO) Take by mouth if needed      ammonium lactate (LAC-HYDRIN) 12 % lotion Apply topically 2 (two) times a day as needed for dry skin 225 g 1    Ascorbic Acid (vitamin C) 1000 MG tablet Take 1,000 mg by mouth daily      Cholecalciferol (VITAMIN D3) 50 MCG (2000 UT) TABS Take 2,000 Units by mouth daily      dabigatran etexilate (PRADAXA) 150 mg capsu Take 1 capsule (150 mg total) by mouth 2 (two) times a day 180 capsule 3    diltiazem (DILACOR XR) 120 MG 24 hr capsule Take 1 capsule (120 mg total) by mouth daily 30 capsule 5    ferrous gluconate (FERGON) 324 mg tablet Take 324 mg by mouth daily with breakfast      fluticasone (FLONASE) 50 mcg/act nasal spray into each nostril      multivitamin (THERAGRAN) TABS Take 1 tablet by mouth in the morning.      omeprazole (PriLOSEC) 40 MG capsule  "Take 1 capsule (40 mg total) by mouth daily 30 capsule 11    Tuberculin-Allergy Syringes (ALLERGY SYRINGE 1CC/27GX1/2\") 27G X 1/2\" 1 ML MISC Use Receives allergy injections once a month through allergist      vitamin B-12 (CYANOCOBALAMIN) 100 MCG TABS Take 50 mcg by mouth daily      acetaminophen (TYLENOL) 325 mg tablet Take 650 mg by mouth every 6 (six) hours as needed for mild pain (Patient not taking: Reported on 12/30/2024)      HYDROcodone-acetaminophen (Norco) 5-325 mg per tablet Take 1 tablet by mouth every 6 (six) hours as needed for pain Max Daily Amount: 4 tablets (Patient not taking: Reported on 1/8/2025) 8 tablet 0    omeprazole (PriLOSEC) 20 mg delayed release capsule take 1 capsule by mouth once daily (Patient not taking: Reported on 12/30/2024) 90 capsule 0     No current facility-administered medications on file prior to visit.     Allergies   Allergen Reactions    Cat Hair Extract     Dust Mite Extract     Iv Dye  [Iodinated Contrast Media] Hives     Category: Allergy;     Other      Possible reaction to chlorhexadine, but this occurred days after and in other areas of the body than just the area prepped    Pollen Extract      Seasonal allergies         Objective   /80 (BP Location: Left arm, Patient Position: Sitting, Cuff Size: Standard)   Pulse 60   Temp 97.5 °F (36.4 °C) (Temporal)   Ht 5' 4\" (1.626 m)   Wt 57.2 kg (126 lb)   SpO2 99%   BMI 21.63 kg/m²      Physical Exam  Vitals and nursing note reviewed.   Constitutional:       General: She is not in acute distress.     Appearance: She is well-developed. She is not diaphoretic.   HENT:      Head: Normocephalic and atraumatic.   Eyes:      Conjunctiva/sclera: Conjunctivae normal.      Pupils: Pupils are equal, round, and reactive to light.   Pulmonary:      Effort: No respiratory distress.   Musculoskeletal:         General: Normal range of motion.      Cervical back: Normal range of motion.   Skin:     General: Skin is warm and dry. "      Capillary Refill: Capillary refill takes less than 2 seconds.      Comments: Right forearm laceration is approximated, but skin edges not adherent or sealing up. No signs of infection. Small amount of clear serous discharge noted from laceration. Small wound to right ankle is full thickness, but stable without infection.   Neurological:      Mental Status: She is alert and oriented to person, place, and time.   Psychiatric:         Behavior: Behavior normal.

## 2025-01-08 ENCOUNTER — OFFICE VISIT (OUTPATIENT)
Dept: SURGERY | Facility: CLINIC | Age: 80
End: 2025-01-08
Payer: COMMERCIAL

## 2025-01-08 VITALS
BODY MASS INDEX: 21.51 KG/M2 | WEIGHT: 126 LBS | OXYGEN SATURATION: 99 % | DIASTOLIC BLOOD PRESSURE: 80 MMHG | HEIGHT: 64 IN | HEART RATE: 60 BPM | SYSTOLIC BLOOD PRESSURE: 118 MMHG | TEMPERATURE: 97.5 F

## 2025-01-08 DIAGNOSIS — S51.811A FOREARM LACERATION, RIGHT, INITIAL ENCOUNTER: Primary | ICD-10-CM

## 2025-01-08 PROCEDURE — 99212 OFFICE O/P EST SF 10 MIN: CPT | Performed by: PHYSICIAN ASSISTANT

## 2025-01-08 NOTE — PATIENT INSTRUCTIONS
Wound care instructions:  Clean with antibacterial soap/water.  Pat dry.  Stop using any ointments or creams to the area.  Cover with dry gauze dressing.  Change every day.  A small amount of Neosporin just where there is drainage from may be used, otherwise avoid ointments to the incision.  Follow-up in 1 week, sooner as needed.

## 2025-01-08 NOTE — ASSESSMENT & PLAN NOTE
The laceration remains approximated without signs of infection. There is clear serous drainage from the laceration itself. The skin edges have not adhered and remain at risk for dehiscence for which she was advised to stop using any ointments to the area and allow 1 additional week before considering suture removal. She states she's been using Santyl to the area and she was advised not to use this product. Wound care instructions provided. Questions answered, agreeable to the plan and to call sooner with concerns.

## 2025-01-15 ENCOUNTER — OFFICE VISIT (OUTPATIENT)
Dept: CARDIOLOGY CLINIC | Facility: CLINIC | Age: 80
End: 2025-01-15
Payer: COMMERCIAL

## 2025-01-15 VITALS
HEIGHT: 64 IN | WEIGHT: 125 LBS | HEART RATE: 74 BPM | DIASTOLIC BLOOD PRESSURE: 80 MMHG | SYSTOLIC BLOOD PRESSURE: 144 MMHG | BODY MASS INDEX: 21.34 KG/M2

## 2025-01-15 DIAGNOSIS — I48.0 PAROXYSMAL A-FIB (HCC): ICD-10-CM

## 2025-01-15 DIAGNOSIS — I10 BENIGN ESSENTIAL HYPERTENSION: Primary | ICD-10-CM

## 2025-01-15 PROCEDURE — 99214 OFFICE O/P EST MOD 30 MIN: CPT | Performed by: INTERNAL MEDICINE

## 2025-01-15 RX ORDER — DILTIAZEM HYDROCHLORIDE 120 MG/1
120 CAPSULE, EXTENDED RELEASE ORAL DAILY
Qty: 90 CAPSULE | Refills: 3 | Status: SHIPPED | OUTPATIENT
Start: 2025-01-15

## 2025-01-15 NOTE — PATIENT INSTRUCTIONS
"Patient Education     Palpitations   The Basics   Written by the doctors and editors at [facility]   What are palpitations? -- Palpitations are changes in your heartbeat that you can feel. You might feel like your heart is beating hard, beating fast, or skipping a beat. Some people describe the feeling as a fluttering or pounding in the chest, neck, or throat.  Most of the time, palpitations are not serious, and go away on their own. But sometimes, they are related to a heart condition that needs treatment.  What causes palpitations? -- There are lots of possible causes. They include:   Heart problems   High blood sugar   Stress, anxiety, or panic attacks   Thyroid problems or other hormone disorders   Certain medicines   Pregnancy   Exercise   Fever   Smoking, alcohol, caffeine, or other substances  Will I need tests? -- Probably. If you have palpitations, your doctor or nurse can do several things to try to figure out the cause:   They will do an exam and ask about your palpitations. They might ask how long you have had them, what they feel like, and when they happen. They might also ask whether things like exercise or changing positions make your palpitations better or worse.   They will also ask about your health. This includes any other symptoms you have, medicines you take, or substances you use.  Your doctor or nurse will also do:   An electrocardiogram (\"ECG\") - This test measures the electrical activity in your heart (figure 1).  Depending on your situation, you might get other tests, such as:   Blood tests   Echocardiogram - This is an imaging test. It creates pictures of your heart as it beats.   Heart monitoring - This involves wearing a small, portable machine that records your heart's electrical activity.  How are palpitations treated? -- Treatment depends on what is causing your palpitations. For example:   If you have a heart problem, your doctor can prescribe medicines or suggest other treatments.   " If you have another health condition, such as diabetes, you might need to take medicines or make lifestyle changes.   If your palpitations are related to a medicine you take, your doctor might switch the medicine or change your dose.   If you have anxiety or panic attacks, your doctor or nurse can suggest treatments that can help.  Sometimes, palpitations go away on their own. This might happen if they were related to a temporary condition, like a fever.  Is there anything I can do on my own? -- You can:   Take all of your medicines as instructed.   Avoid smoking. If you are having trouble quitting smoking, your doctor or nurse can help.   Avoid or limit alcohol and caffeine.   Try to get regular physical activity. Even gentle forms of activity, like walking, are good for your health. If exercise causes palpitations or other symptoms, talk to your doctor or nurse about what kinds of physical activity are safe to do.   Find healthy ways to cope with stress.  When should I call the doctor? -- Some heart problems can increase your risk of heart attack or stroke.  Call for an ambulance (in the [place] and [place], call 9-1-1) if:   You have signs of a heart attack. This might include severe chest pain, pressure, or discomfort with:   Breathing trouble, sweating, upset stomach, or cold and clammy skin   Pain in your arms, back, or jaw   Worse pain with activity like walking up stairs   Fast or irregular heartbeat   Feeling dizzy, faint, or weak   You have signs of a stroke, like sudden:   Numbness or weakness of the face, arm, or leg, especially on 1 side of the body   Confusion, or trouble speaking or understanding   Trouble seeing in 1 or both eyes   Trouble walking, dizziness, or loss of balance or coordination   Severe headache with no known cause  Call your doctor or nurse for advice if:   You notice new changes in your heartbeat.   You have other new symptoms that worry you.  All topics are updated as new evidence  "becomes available and our peer review process is complete.  This topic retrieved from [employer] on: Mar 13, 2024.  Topic 840936 Version 2.0  Release: 32.2.4 - C32.71  © 2024 UpToDate, Inc. and/or its affiliates. All rights reserved.  figure 1: Person having an ECG     This drawing shows a person having an ECG (also called an electrocardiogram or EKG). There are patches, called \"electrodes,\" stuck onto the chest, arms, and legs. Wires run from the electrodes to the ECG machine. An ECG measures the electrical activity in the heart.  Graphic 97913 Version 3.0  Consumer Information Use and Disclaimer   Disclaimer: This generalized information is a limited summary of diagnosis, treatment, and/or medication information. It is not meant to be comprehensive and should be used as a tool to help the user understand and/or assess potential diagnostic and treatment options. It does NOT include all information about conditions, treatments, medications, side effects, or risks that may apply to a specific patient. It is not intended to be medical advice or a substitute for the medical advice, diagnosis, or treatment of a health care provider based on the health care provider's examination and assessment of a patient's specific and unique circumstances. Patients must speak with a health care provider for complete information about their health, medical questions, and treatment options, including any risks or benefits regarding use of medications. This information does not endorse any treatments or medications as safe, effective, or approved for treating a specific patient. UpToDate, Inc. and its affiliates disclaim any warranty or liability relating to this information or the use thereof.The use of this information is governed by the Terms of Use, available at https://www.woltersPrePayMeuwer.com/en/know/clinical-effectiveness-terms. 2024© UpToDate, Inc. and its affiliates and/or licensors. All rights reserved.  Copyright   © 2024 " [employer] and/or its affiliates. All rights reserved.

## 2025-01-16 NOTE — PROGRESS NOTES
Name: Margarito Nieves      : 1945      MRN: [id number]  Encounter Provider: Farhad Landa PA-C  Encounter Date: 2025   Encounter department: [facility] GENERAL SURGERY [place]  :  Assessment & Plan  Forearm laceration, right, initial encounter  3 weeks after laceration repair returns to the office.  Reports persistent swelling around the area and pruritus however no drainage from the incision.  On examination in the office today the incision is intact and healing well.  The sutures were removed and the closure reinforced with Steri-Strips with instructions on how to manage this reviewed.  She was offered 1 week follow-up foot for wound check but elects to call back on as-needed basis.  Discussed timing of scar massage once fully healed.  Questions answered, agreeable to the plan.             History of Present Illness   HPI  Margarito Nieves is a 79 y.o. female who presents with a 1 week right  arm wound check. Pt states the pain has gotten a lot better but it still sore and there some redness around the area. The drainage stopped. No fevers/chills. Amilia.O,MA   History obtained from: patient    Review of Systems   Skin:  Positive for wound.   All other systems reviewed and are negative.    Past Medical History   Past Medical History:   Diagnosis Date    Cellulitis of right hand 05/10/2022    Closed fracture of right proximal humerus 02/15/2020    GERD (gastroesophageal reflux disease)     NSTEMI (non-ST elevated myocardial infarction) (HCC) 2020    due to demand ischemia post fall/fracture    Osteopenia 10/23/2017    PAF (paroxysmal atrial fibrillation)     Syncope      Past Surgical History:   Procedure Laterality Date    APPENDECTOMY      CATARACT EXTRACTION      LAPAROSCOPIC APPENDECTOMY      incidental     [place] TENDON SHEATH INCISION Left 2019    Procedure: LONG FINGER TRIGGER FINGER RELEASE;  Surgeon: Ash Louis MD;  Location: [facility];  Service: Orthopedics     TONSILLECTOMY      TUBAL LIGATION       Family History   Problem Relation Age of Onset    Lung cancer Mother     Hypertension Mother     Cancer Mother     Cerebral aneurysm Father         bleed    Alcohol abuse Father     Cirrhosis Father     Other Father         had fallen    Other Sister         unkownn cause     Other Sister         in MVA    No Known Problems Daughter     No Known Problems Daughter     No Known Problems Maternal Grandmother     No Known Problems Maternal Grandfather     No Known Problems Paternal Grandmother     No Known Problems Paternal Grandfather     Pneumonia Brother         infancy of pneumonia    No Known Problems Son       reports that she has quit smoking. Her smoking use included cigarettes. She has never used smokeless tobacco. She reports current alcohol use of about 10.0 standard drinks of alcohol per week. She reports that she does not use drugs.  Current Outpatient Medications on File Prior to Visit   Medication Sig Dispense Refill    Acetaminophen (TYLENOL ARTHRITIS PAIN PO) Take by mouth if needed      acetaminophen (TYLENOL) 325 mg tablet Take 650 mg by mouth every 6 (six) hours as needed for mild pain      ammonium lactate (LAC-HYDRIN) 12 % lotion Apply topically 2 (two) times a day as needed for dry skin 225 g 1    Ascorbic Acid (vitamin C) 1000 MG tablet Take 1,000 mg by mouth daily      Cholecalciferol (VITAMIN D3) 50 MCG (2000 UT) TABS Take 2,000 Units by mouth daily      dabigatran etexilate (PRADAXA) 150 mg capsu Take 1 capsule (150 mg total) by mouth 2 (two) times a day 180 capsule 3    diltiazem (DILACOR XR) 120 MG 24 hr capsule Take 1 capsule (120 mg total) by mouth daily 90 capsule 3    ferrous gluconate (FERGON) 324 mg tablet Take 324 mg by mouth daily with breakfast      fluticasone (FLONASE) 50 mcg/act nasal spray into each nostril      multivitamin (THERAGRAN) TABS Take 1 tablet by mouth in the morning.      omeprazole (PriLOSEC) 40 MG capsule Take 1 capsule (40  "mg total) by mouth daily 30 capsule 11    Tuberculin-Allergy Syringes (ALLERGY SYRINGE 1CC/27GX1/2\") 27G X 1/2\" 1 ML MISC Use Receives allergy injections once a month through allergist      vitamin B-12 (CYANOCOBALAMIN) 100 MCG TABS Take 50 mcg by mouth daily      HYDROcodone-acetaminophen (Norco) 5-325 mg per tablet Take 1 tablet by mouth every 6 (six) hours as needed for pain Max Daily Amount: 4 tablets (Patient not taking: Reported on 1/17/2025) 8 tablet 0    omeprazole (PriLOSEC) 20 mg delayed release capsule take 1 capsule by mouth once daily (Patient not taking: Reported on 12/30/2024) 90 capsule 0     No current facility-administered medications on file prior to visit.     Allergies   Allergen Reactions    Cat Hair Extract     Dust Mite Extract     Iv Dye  [Iodinated Contrast Media] Hives     Category: Allergy;     Other      Possible reaction to chlorhexadine, but this occurred days after and in other areas of the body than just the area prepped    Pollen Extract      Seasonal allergies         Objective   Temp 98.8 °F (37.1 °C) (Temporal)      Physical Exam  Vitals and nursing note reviewed.   Constitutional:       General: She is not in acute distress.     Appearance: She is well-developed. She is not diaphoretic.   HENT:      Head: Normocephalic and atraumatic.   Eyes:      Conjunctiva/sclera: Conjunctivae normal.      Pupils: Pupils are equal, round, and reactive to light.   Pulmonary:      Effort: No respiratory distress.   Musculoskeletal:         General: Normal range of motion.      Cervical back: Normal range of motion.   Skin:     General: Skin is warm and dry.      Capillary Refill: Capillary refill takes less than 2 seconds.      Comments: Laceration to right forearm is C/D/I, well approximated. Without infection or drainage. Mild suture irritation noted. Minimal edema of distal forearm.   Neurological:      Mental Status: She is alert and oriented to person, place, and time.   Psychiatric:       "   Behavior: Behavior normal.

## 2025-01-17 ENCOUNTER — OFFICE VISIT (OUTPATIENT)
Dept: SURGERY | Facility: CLINIC | Age: 80
End: 2025-01-17
Payer: COMMERCIAL

## 2025-01-17 VITALS — TEMPERATURE: 98.8 F

## 2025-01-17 DIAGNOSIS — S51.811A FOREARM LACERATION, RIGHT, INITIAL ENCOUNTER: Primary | ICD-10-CM

## 2025-01-17 PROCEDURE — 99212 OFFICE O/P EST SF 10 MIN: CPT | Performed by: PHYSICIAN ASSISTANT

## 2025-01-17 NOTE — ASSESSMENT & PLAN NOTE
3 weeks after laceration repair returns to the office.  Reports persistent swelling around the area and pruritus however no drainage from the incision.  On examination in the office today the incision is intact and healing well.  The sutures were removed and the closure reinforced with Steri-Strips with instructions on how to manage this reviewed.  She was offered 1 week follow-up foot for wound check but elects to call back on as-needed basis.  Discussed timing of scar massage once fully healed.  Questions answered, agreeable to the plan.

## 2025-01-18 DIAGNOSIS — K21.9 GASTROESOPHAGEAL REFLUX DISEASE, UNSPECIFIED WHETHER ESOPHAGITIS PRESENT: ICD-10-CM

## 2025-01-29 PROBLEM — S51.811A FOREARM LACERATION, RIGHT, INITIAL ENCOUNTER: Status: RESOLVED | Noted: 2024-12-30 | Resolved: 2025-01-29

## 2025-02-28 ENCOUNTER — TELEPHONE (OUTPATIENT)
Dept: GASTROENTEROLOGY | Facility: CLINIC | Age: 80
End: 2025-02-28

## 2025-02-28 NOTE — TELEPHONE ENCOUNTER
Placed call to patient and left a message to return our call.    Need to reschedule her visit on 3/6 with Miranda due to her being out of the office. We can either see her at 730 am that morning or a different day unless urgent. If urgent see Lyudmila.

## 2025-05-29 ENCOUNTER — APPOINTMENT (EMERGENCY)
Dept: RADIOLOGY | Facility: HOSPITAL | Age: 80
DRG: 605 | End: 2025-05-29
Payer: COMMERCIAL

## 2025-05-29 ENCOUNTER — OFFICE VISIT (OUTPATIENT)
Dept: URGENT CARE | Facility: CLINIC | Age: 80
End: 2025-05-29
Payer: COMMERCIAL

## 2025-05-29 ENCOUNTER — HOSPITAL ENCOUNTER (INPATIENT)
Facility: HOSPITAL | Age: 80
LOS: 2 days | Discharge: HOME | DRG: 605 | End: 2025-05-31
Attending: EMERGENCY MEDICINE | Admitting: INTERNAL MEDICINE
Payer: COMMERCIAL

## 2025-05-29 VITALS
SYSTOLIC BLOOD PRESSURE: 154 MMHG | WEIGHT: 125 LBS | RESPIRATION RATE: 16 BRPM | DIASTOLIC BLOOD PRESSURE: 67 MMHG | HEIGHT: 64 IN | OXYGEN SATURATION: 98 % | BODY MASS INDEX: 21.34 KG/M2 | TEMPERATURE: 98.9 F | HEART RATE: 60 BPM

## 2025-05-29 DIAGNOSIS — S61.431A: Primary | ICD-10-CM

## 2025-05-29 DIAGNOSIS — M79.89 SWELLING OF RIGHT HAND: ICD-10-CM

## 2025-05-29 DIAGNOSIS — S61.239A: ICD-10-CM

## 2025-05-29 DIAGNOSIS — L03.113 CELLULITIS OF RIGHT UPPER EXTREMITY: Primary | ICD-10-CM

## 2025-05-29 LAB
ALBUMIN SERPL BCG-MCNC: 4.1 G/DL (ref 3.5–5)
ALP SERPL-CCNC: 116 U/L (ref 34–104)
ALT SERPL W P-5'-P-CCNC: 18 U/L (ref 7–52)
ANION GAP SERPL CALCULATED.3IONS-SCNC: 9 MMOL/L (ref 4–13)
AST SERPL W P-5'-P-CCNC: 28 U/L (ref 13–39)
BASOPHILS # BLD AUTO: 0.03 THOUSANDS/ÂΜL (ref 0–0.1)
BASOPHILS NFR BLD AUTO: 0 % (ref 0–1)
BILIRUB SERPL-MCNC: 0.45 MG/DL (ref 0.2–1)
BUN SERPL-MCNC: 18 MG/DL (ref 5–25)
CALCIUM SERPL-MCNC: 9.4 MG/DL (ref 8.4–10.2)
CHLORIDE SERPL-SCNC: 103 MMOL/L (ref 96–108)
CO2 SERPL-SCNC: 26 MMOL/L (ref 21–32)
CREAT SERPL-MCNC: 1.17 MG/DL (ref 0.6–1.3)
EOSINOPHIL # BLD AUTO: 0.03 THOUSAND/ÂΜL (ref 0–0.61)
EOSINOPHIL NFR BLD AUTO: 0 % (ref 0–6)
ERYTHROCYTE [DISTWIDTH] IN BLOOD BY AUTOMATED COUNT: 11.3 % (ref 11.6–15.1)
GFR SERPL CREATININE-BSD FRML MDRD: 44 ML/MIN/1.73SQ M
GLUCOSE SERPL-MCNC: 82 MG/DL (ref 65–140)
HCT VFR BLD AUTO: 37.9 % (ref 34.8–46.1)
HGB BLD-MCNC: 12.3 G/DL (ref 11.5–15.4)
IMM GRANULOCYTES # BLD AUTO: 0.05 THOUSAND/UL (ref 0–0.2)
IMM GRANULOCYTES NFR BLD AUTO: 1 % (ref 0–2)
LYMPHOCYTES # BLD AUTO: 0.95 THOUSANDS/ÂΜL (ref 0.6–4.47)
LYMPHOCYTES NFR BLD AUTO: 12 % (ref 14–44)
MCH RBC QN AUTO: 31.9 PG (ref 26.8–34.3)
MCHC RBC AUTO-ENTMCNC: 32.5 G/DL (ref 31.4–37.4)
MCV RBC AUTO: 98 FL (ref 82–98)
MONOCYTES # BLD AUTO: 0.47 THOUSAND/ÂΜL (ref 0.17–1.22)
MONOCYTES NFR BLD AUTO: 6 % (ref 4–12)
NEUTROPHILS # BLD AUTO: 6.64 THOUSANDS/ÂΜL (ref 1.85–7.62)
NEUTS SEG NFR BLD AUTO: 81 % (ref 43–75)
NRBC BLD AUTO-RTO: 0 /100 WBCS
PLATELET # BLD AUTO: 324 THOUSANDS/UL (ref 149–390)
PMV BLD AUTO: 10.9 FL (ref 8.9–12.7)
POTASSIUM SERPL-SCNC: 4.7 MMOL/L (ref 3.5–5.3)
PROCALCITONIN SERPL-MCNC: 0.07 NG/ML
PROT SERPL-MCNC: 7.3 G/DL (ref 6.4–8.4)
RBC # BLD AUTO: 3.86 MILLION/UL (ref 3.81–5.12)
SODIUM SERPL-SCNC: 138 MMOL/L (ref 135–147)
WBC # BLD AUTO: 8.17 THOUSAND/UL (ref 4.31–10.16)

## 2025-05-29 PROCEDURE — 80053 COMPREHEN METABOLIC PANEL: CPT

## 2025-05-29 PROCEDURE — 99222 1ST HOSP IP/OBS MODERATE 55: CPT

## 2025-05-29 PROCEDURE — 96365 THER/PROPH/DIAG IV INF INIT: CPT

## 2025-05-29 PROCEDURE — 99285 EMERGENCY DEPT VISIT HI MDM: CPT | Performed by: EMERGENCY MEDICINE

## 2025-05-29 PROCEDURE — 99212 OFFICE O/P EST SF 10 MIN: CPT

## 2025-05-29 PROCEDURE — 73130 X-RAY EXAM OF HAND: CPT

## 2025-05-29 PROCEDURE — 85025 COMPLETE CBC W/AUTO DIFF WBC: CPT

## 2025-05-29 PROCEDURE — 87040 BLOOD CULTURE FOR BACTERIA: CPT

## 2025-05-29 PROCEDURE — 84145 PROCALCITONIN (PCT): CPT

## 2025-05-29 PROCEDURE — 36415 COLL VENOUS BLD VENIPUNCTURE: CPT

## 2025-05-29 PROCEDURE — 99283 EMERGENCY DEPT VISIT LOW MDM: CPT

## 2025-05-29 RX ORDER — PANTOPRAZOLE SODIUM 40 MG/1
40 TABLET, DELAYED RELEASE ORAL
Status: DISCONTINUED | OUTPATIENT
Start: 2025-05-30 | End: 2025-05-31 | Stop reason: HOSPADM

## 2025-05-29 RX ORDER — FLUTICASONE PROPIONATE 50 MCG
1 SPRAY, SUSPENSION (ML) NASAL 2 TIMES DAILY
Status: DISCONTINUED | OUTPATIENT
Start: 2025-05-29 | End: 2025-05-31 | Stop reason: HOSPADM

## 2025-05-29 RX ORDER — OXYCODONE HYDROCHLORIDE 5 MG/1
5 TABLET ORAL EVERY 4 HOURS PRN
Refills: 0 | Status: DISCONTINUED | OUTPATIENT
Start: 2025-05-29 | End: 2025-05-30

## 2025-05-29 RX ORDER — ASCORBIC ACID 500 MG
1000 TABLET ORAL DAILY
Status: DISCONTINUED | OUTPATIENT
Start: 2025-05-30 | End: 2025-05-31 | Stop reason: HOSPADM

## 2025-05-29 RX ORDER — OXYCODONE HYDROCHLORIDE 10 MG/1
10 TABLET ORAL EVERY 4 HOURS PRN
Refills: 0 | Status: DISCONTINUED | OUTPATIENT
Start: 2025-05-29 | End: 2025-05-30

## 2025-05-29 RX ORDER — DILTIAZEM HCL 60 MG
120 TABLET ORAL DAILY
Status: DISCONTINUED | OUTPATIENT
Start: 2025-05-30 | End: 2025-05-31 | Stop reason: HOSPADM

## 2025-05-29 RX ORDER — DABIGATRAN ETEXILATE 150 MG/1
150 CAPSULE ORAL 2 TIMES DAILY
Status: DISCONTINUED | OUTPATIENT
Start: 2025-05-29 | End: 2025-05-31 | Stop reason: HOSPADM

## 2025-05-29 RX ORDER — HYDROMORPHONE HCL/PF 1 MG/ML
0.5 SYRINGE (ML) INJECTION EVERY 4 HOURS PRN
Refills: 0 | Status: DISCONTINUED | OUTPATIENT
Start: 2025-05-29 | End: 2025-05-31 | Stop reason: HOSPADM

## 2025-05-29 RX ORDER — ACETAMINOPHEN 325 MG/1
650 TABLET ORAL EVERY 6 HOURS PRN
Status: DISCONTINUED | OUTPATIENT
Start: 2025-05-29 | End: 2025-05-31 | Stop reason: HOSPADM

## 2025-05-29 RX ORDER — FERROUS GLUCONATE 324(38)MG
324 TABLET ORAL
Status: DISCONTINUED | OUTPATIENT
Start: 2025-05-30 | End: 2025-05-31 | Stop reason: HOSPADM

## 2025-05-29 RX ADMIN — SODIUM CHLORIDE 3 G: 9 INJECTION, SOLUTION INTRAVENOUS at 19:58

## 2025-05-29 RX ADMIN — VANCOMYCIN HYDROCHLORIDE 750 MG: 1 INJECTION, POWDER, LYOPHILIZED, FOR SOLUTION INTRAVENOUS at 16:36

## 2025-05-29 RX ADMIN — OXYCODONE HYDROCHLORIDE 10 MG: 10 TABLET ORAL at 20:52

## 2025-05-29 RX ADMIN — ACETAMINOPHEN 650 MG: 325 TABLET ORAL at 20:10

## 2025-05-29 NOTE — ED ATTENDING ATTESTATION
5/29/2025  I, Bry Squires Jr, DO, saw and evaluated the patient. I have discussed the patient with the resident/non-physician practitioner and agree with the resident's/non-physician practitioner's findings, Plan of Care, and MDM as documented in the resident's/non-physician practitioner's note, except where noted. All available labs and Radiology studies were reviewed.  I was present for key portions of any procedure(s) performed by the resident/non-physician practitioner and I was immediately available to provide assistance.       At this point I agree with the current assessment done in the Emergency Department.  I have conducted an independent evaluation of this patient a history and physical is as follows:    Patient is a 79-year-old female who presents emergency department complaining swelling of the right hand which is her dominant hand.  Patient denies fever but admits to chills and feeling overall poorly.  Patient was in the woods feeding animals and noted that she felt something in her thenar eminence of her right hand.  She did not think much about it but then noticed that there was a white bump visible on the palm of her hand.  She was having some discomfort at that time but she did take a nonsterilized pin and try to pop the area.  Ever since then, which was 5 to 7 days ago, she has gotten more redness and swelling and pain to her thumb index and middle finger.  The patient says she cannot do much of anything due to pain.  Differential diagnosis cellulitis, arthritis or specifically flexor tenosynovitis.  Patient will have lab work as well as an x-ray and IV antibiotics.  Will discuss the case with orthopedics or hand.    ED Course         Critical Care Time  Procedures

## 2025-05-29 NOTE — ASSESSMENT & PLAN NOTE
Blood pressures reviewed, slight elevation likely related to pain will address  Continue prehospital diltiazem  Monitor BP per protocol

## 2025-05-29 NOTE — H&P
"H&P - Hospitalist   Name: Margarito Nieves 79 y.o. female I MRN: [id number]  Unit/Bed#: [facility] I Date of Admission: 5/29/2025   Date of Service: 5/29/2025 I Hospital Day: 0     Assessment & Plan  Puncture wound of finger of right hand with complication  Patient reports she developed sudden swelling in her right hand that started about 5 days ago.  She is unclear how it happened.  She speculated that she may have gotten \"bitten by something\" when she was feeding the birds and chipmunks.  She further added that she noticed a raised area at the base of her left thumb about 2 days ago and she decided to stick a needle in it to try to rest what ever was in there.  She said nothing came out of her hand.  She repeated the of the needle yesterday and again nothing returned.  She developed worsening swelling in her hand overnight and pain.  She went to urgent care who sent her here to the ED for further evaluation and treatment.    Obtain blood cultures x 2  Right hand x-ray read pending  Procalcitonin pending  No leukocytosis  Received vancomycin in the ED  Will initiate Unasyn on the MedSurg floor  Consult orthopedics  Consult ID  Adding pain regimen, monitor effectiveness  CBC a.m.  Benign essential hypertension  Blood pressures reviewed, slight elevation likely related to pain will address  Continue prehospital diltiazem  Monitor BP per protocol  Paroxysmal A-fib (HCC)  Continue prehospital diltiazem for rate control  Continue prehospital Pradaxa for anticoagulation  GERD (gastroesophageal reflux disease)  Prehospital Prilosec substituted with pantoprazole while inpatient      VTE Pharmacologic Prophylaxis: VTE Score: 4 Moderate Risk (Score 3-4) - Pharmacological DVT Prophylaxis Ordered: dabigatran (Pradaxa).  Code Status: Full code   Discussion with family: Patient declined call to .     Anticipated Length of Stay: Patient will be admitted on an inpatient basis with an anticipated length of stay of greater than " 2 midnights secondary to puncture wound of the right hand cellulitic appearance requiring IV antibiotics, orthopedic consult, pain regimen.    History of Present Illness   Chief Complaint: Right hand pain and swelling    Margarito Nieves is a 79 y.o. female with a PMH of A-fib on Pradaxa, essential hypertension, and GERD who originally presented to urgent care today due to worsening pain and swelling in her right hand.  She was sent from urgent care to ER for further evaluation.  Patient reports that she started with swelling in her hand about 5 days ago.  Unclear etiology, she speculates that she may have gotten bit by something.  She states that she feeds the birds and chipmunks and was sticking her hands in and out of an open log and may have gotten bitten by something.  She reported about 2 days ago there was a raised area at the base of her thumb with a scabbed area on the top.  She felt there may be some fluid in there so she stuck the area with a needle to try to express the fluid, however none returned.  She repeated the process yesterday morning, again no fluid returned.  Overnight she developed worsening redness and swelling in the hand.  She denied any fever chills prehospital.  She received vancomycin in the ED.  Will initiate Unasyn.  Placing consult to orthopedics and ID.  Will add pain regimen.  Patient is being admitted to some service for further management.    Review of Systems   Constitutional: Negative.  Negative for activity change, appetite change, chills, diaphoresis, fatigue, fever and unexpected weight change.   HENT: Negative.     Eyes: Negative.    Respiratory:  Negative for cough, choking, shortness of breath and wheezing.    Cardiovascular: Negative.  Negative for chest pain and palpitations.   Gastrointestinal: Negative.  Negative for abdominal distention, constipation, diarrhea and nausea.   Endocrine: Negative.    Genitourinary:  Negative for dysuria, flank pain, hematuria and urgency.    Musculoskeletal: Negative.    Skin:  Positive for wound.   Allergic/Immunologic: Negative.    Neurological: Negative.  Negative for dizziness and light-headedness.   Hematological: Negative.    Psychiatric/Behavioral: Negative.         Historical Information   Past Medical History[1]  Past Surgical History[2]  Social History[3]  E-Cigarette/Vaping    E-Cigarette Use Never User      E-Cigarette/Vaping Substances    Nicotine No     THC No     CBD No     Flavoring No     Other No     Unknown No      Family History[4]  Social History:  Marital Status: /Civil Union   Occupation: Retired  Patient Pre-hospital Living Situation: Home  Patient Pre-hospital Level of Mobility: walks  Patient Pre-hospital Diet Restrictions: None    Meds/Allergies   I have reviewed home medications with patient personally.  Prior to Admission medications    Medication Sig Start Date End Date Taking? Authorizing Provider   Acetaminophen (TYLENOL ARTHRITIS PAIN PO) Take by mouth if needed    Historical Provider, MD   acetaminophen (TYLENOL) 325 mg tablet Take 650 mg by mouth every 6 (six) hours as needed for mild pain    Historical Provider, MD   ammonium lactate (LAC-HYDRIN) 12 % lotion Apply topically 2 (two) times a day as needed for dry skin 3/30/23   Maraí Stinson DO   Ascorbic Acid (vitamin C) 1000 MG tablet Take 1,000 mg by mouth in the morning.    Historical Provider, MD   Cholecalciferol (VITAMIN D3) 50 MCG (2000 UT) TABS Take 2,000 Units by mouth in the morning.    Historical Provider, MD   dabigatran etexilate (PRADAXA) 150 mg capsu Take 1 capsule (150 mg total) by mouth 2 (two) times a day 9/30/24   Luis Manuel Miranda DO   diltiazem (DILACOR XR) 120 MG 24 hr capsule Take 1 capsule (120 mg total) by mouth daily 1/15/25   Luis Manuel Miranda DO   ferrous gluconate (FERGON) 324 mg tablet Take 324 mg by mouth daily with breakfast    Historical Provider, MD   fluticasone (FLONASE) 50 mcg/act nasal spray into each nostril    Historical  "Provider, MD   HYDROcodone-acetaminophen (Norco) 5-325 mg per tablet Take 1 tablet by mouth every 6 (six) hours as needed for pain Max Daily Amount: 4 tablets  Patient not taking: Reported on 12/30/2024 12/27/24   Lion Xavier PA-C   multivitamin (THERAGRAN) TABS Take 1 tablet by mouth in the morning.    Historical Provider, MD   omeprazole (PriLOSEC) 20 mg delayed release capsule take 1 capsule by mouth once daily 1/20/25   María Stinson DO   omeprazole (PriLOSEC) 40 MG capsule Take 1 capsule (40 mg total) by mouth daily 12/4/24   Nilam Mora MD   Tuberculin-Allergy Syringes (ALLERGY SYRINGE 1CC/27GX1/2\") 27G X 1/2\" 1 ML MISC Use Receives allergy injections once a month through allergist    Historical Provider, MD   vitamin B-12 (CYANOCOBALAMIN) 100 MCG TABS Take 50 mcg by mouth in the morning.    Historical Provider, MD     Allergies   Allergen Reactions    Cat Dander     Dust Mite Extract     Iv Dye  [Iodinated Contrast Media] Hives     Category: Allergy;     Other      Possible reaction to chlorhexadine, but this occurred days after and in other areas of the body than just the area prepped    Pollen Extract      Seasonal allergies       Objective :  Temp:  [98.9 °F (37.2 °C)-99.4 °F (37.4 °C)] 99.2 °F (37.3 °C)  HR:  [58-63] 59  BP: (154-193)/(67-77) 169/73  Resp:  [16-18] 17  SpO2:  [96 %-99 %] 96 %  O2 Device: None (Room air)    Physical Exam  Vitals reviewed.   Constitutional:       General: She is not in acute distress.     Appearance: She is well-developed.   HENT:      Head: Normocephalic and atraumatic.     Cardiovascular:      Rate and Rhythm: Normal rate and regular rhythm.      Pulses: Normal pulses.      Heart sounds: Normal heart sounds. No murmur heard.  Pulmonary:      Effort: Pulmonary effort is normal. No respiratory distress.      Breath sounds: Normal breath sounds. No wheezing or rales.   Abdominal:      General: There is no distension.      Palpations: Abdomen is soft.      " Tenderness: There is no abdominal tenderness. There is no guarding.     Musculoskeletal:         General: No swelling.     Skin:     General: Skin is warm and dry.      Capillary Refill: Capillary refill takes less than 2 seconds.      Findings: Erythema present.      Comments: Right hand erythema with swelling.  Small puncture area at base of right thumb       Neurological:      General: No focal deficit present.      Mental Status: She is alert and oriented to person, place, and time. Mental status is at baseline.     Psychiatric:         Mood and Affect: Mood normal.         Behavior: Behavior normal.         Thought Content: Thought content normal.         Judgment: Judgment normal.          Lines/Drains:            Lab Results: I have reviewed the following results:  Results from last 7 days   Lab Units 05/29/25  1623   WBC Thousand/uL 8.17   HEMOGLOBIN g/dL 12.3   HEMATOCRIT % 37.9   PLATELETS Thousands/uL 324   SEGS PCT % 81*   LYMPHO PCT % 12*   MONO PCT % 6   EOS PCT % 0     Results from last 7 days   Lab Units 05/29/25  1623   SODIUM mmol/L 138   POTASSIUM mmol/L 4.7   CHLORIDE mmol/L 103   CO2 mmol/L 26   BUN mg/dL 18   CREATININE mg/dL 1.17   ANION GAP mmol/L 9   CALCIUM mg/dL 9.4   ALBUMIN g/dL 4.1   TOTAL BILIRUBIN mg/dL 0.45   ALK PHOS U/L 116*   ALT U/L 18   AST U/L 28   GLUCOSE RANDOM mg/dL 82             Lab Results   Component Value Date    HGBA1C 5.2 04/13/2021           Imaging Results Review: No pertinent imaging studies reviewed.  Other Study Results Review: No additional pertinent studies reviewed.    Administrative Statements   I have spent a total time of 55 minutes in caring for this patient on the day of the visit/encounter including Patient and family education, Importance of tx compliance, Counseling / Coordination of care, Documenting in the medical record, Reviewing/placing orders in the medical record (including tests, medications, and/or procedures), Obtaining or reviewing history   , and Communicating with other healthcare professionals .    ** Please Note: This note has been constructed using a voice recognition system. **         [1]   Past Medical History:  Diagnosis Date    Cellulitis of right hand 05/10/2022    Closed fracture of right proximal humerus 02/15/2020    GERD (gastroesophageal reflux disease)     NSTEMI (non-ST elevated myocardial infarction) ([facility]) 02/16/2020    due to demand ischemia post fall/fracture    Osteopenia 10/23/2017    PAF (paroxysmal atrial fibrillation)     Syncope    [2]   Past Surgical History:  Procedure Laterality Date    APPENDECTOMY      CATARACT EXTRACTION      LAPAROSCOPIC APPENDECTOMY      incidental     [place] TENDON SHEATH INCISION Left 2/26/2019    Procedure: LONG FINGER TRIGGER FINGER RELEASE;  Surgeon: Ash Louis MD;  Location: [facility];  Service: Orthopedics    TONSILLECTOMY      TUBAL LIGATION     [3]   Social History  Tobacco Use    Smoking status: Former     Current packs/day: 1.00     Types: Cigarettes    Smokeless tobacco: Never    Tobacco comments:     age [age]    Vaping Use    Vaping status: Never Used   Substance and Sexual Activity    Alcohol use: Yes     Alcohol/week: 10.0 standard drinks of alcohol     Types: 10 Glasses of wine per week     Comment: 3glasses wine daily    Drug use: No    Sexual activity: Not Currently   [4]   Family History  Problem Relation Name Age of Onset    Lung cancer Mother      Hypertension Mother      Cancer Mother      Cerebral aneurysm Father          bleed    Alcohol abuse Father      Cirrhosis Father      Other Father          had fallen    Other Sister          unkownn cause     Other Sister          in MVA    No Known Problems Daughter      No Known Problems Daughter      No Known Problems Maternal Grandmother      No Known Problems Maternal Grandfather      No Known Problems Paternal Grandmother      No Known Problems Paternal Grandfather      Pneumonia Brother          infancy of pneumonia    No  Known Problems Son

## 2025-05-29 NOTE — ED PROVIDER NOTES
Time reflects when diagnosis was documented in both MDM as applicable and the Disposition within this note       Time User Action Codes Description Comment    5/29/2025  5:02 PM Disha Esparzailia Add [M79.89] Swelling of right hand     5/29/2025  5:03 PM Mckenzie Esparza Add [S61.431A] Puncture wound of right hand with complication, initial encounter     5/29/2025  5:03 PM Isaias Mckenzie Modify [M79.89] Swelling of right hand     5/29/2025  5:03 PM Isaias Mckenzie Modify [S61.431A] Puncture wound of right hand with complication, initial encounter           ED Disposition       ED Disposition   Admit    Condition   Stable    Date/Time   Thu May 29, 2025  5:02 PM    Comment   Case was discussed with Dr. Tirado and the patient's admission status was agreed to be Admission Status: inpatient status to the service of Dr. Tirado.               Assessment & Plan       Medical Decision Making  79 y.o. female presents to ED for evaluation of right hand swelling x 7 days.  Further details in HPI.     Ddx: includes but not limited to flexor tenosynovitis, cellulitis, abscess, osteomyelitis, sporotrichosis.     Plan lab evaluation including CBC, CMP, right hand xray to evaluate for bony involvement. Given pain with passive extension, indicating Kanavels signs with flexor tenosynovitis, will contact orthopedics for further recommendations. Initiate vancomycin for antimicrobial coverage.     Final Evaluation:  (see ED course for additional MDM)  Given circumferential erythema and swelling, concern for flexor tenosynovitis, patient placed on IV antibiotics of vancomycin and cefazolin. After consultation with orthopedics team, Dr. Byrnes and Dr. Cash, patient deemed stable for admission at [facility] with monitoring of clinical exam on IV antibiotics.  Case was discussed with Dr. Tirado and the patient's admission status was agreed to be Admission Status: inpatient status to the service of Dr. Tirado.    Amount and/or Complexity  of Data Reviewed  Labs: ordered.  Radiology: ordered.    Risk  Decision regarding hospitalization.             Medications   vancomycin 750 mg in sodium chloride 0.9% 250 mL (750 mg Intravenous New Bag 5/29/25 1636)       ED Risk Strat Scores                    No data recorded        SBIRT 20yo+      Flowsheet Row Most Recent Value   Initial Alcohol Screen: US AUDIT-C     1. How often do you have a drink containing alcohol? 6 Filed at: 05/29/2025 1547   2. How many drinks containing alcohol do you have on a typical day you are drinking?  2 Filed at: 05/29/2025 1547   3b. FEMALE Any Age, or MALE 65+: How often do you have 4 or more drinks on one occassion? 0 Filed at: 05/29/2025 1547   Audit-C Score 8 Filed at: 05/29/2025 1547   Full Alcohol Screen: US AUDIT    4. How often during the last year have you found that you were not able to stop drinking once you had started? 0 Filed at: 05/29/2025 1547   5. How often during past year have you failed to do what was normally expected of you because of drinking?  0 Filed at: 05/29/2025 1547   6. How often in past year have you needed a first drink in the morning to get yourself going after a heavy drinking session?  0 Filed at: 05/29/2025 1547   7. How often in past year have you had feeling of guilt or remorse after drinking?  0 Filed at: 05/29/2025 1547   8. How often in past year have you been unable to remember what happened night before because you had been drinking?  0 Filed at: 05/29/2025 1547   9. Have you or someone else been injured as a result of your drinking?  0 Filed at: 05/29/2025 1547   10. Has a relative, friend, doctor or other health worker been concerned about your drinking and suggested you cut down?  0 Filed at: 05/29/2025 1547   AUDIT Total Score 8 Filed at: 05/29/2025 1547   LEONARD: How many times in the past year have you...    Used an illegal drug or used a prescription medication for non-medical reasons? Never Filed at: 05/29/2025 1547                             History of Present Illness       Chief Complaint   Patient presents with    Hand Swelling     Pt to ER from urgent care for reports of R hand swelling and redness worsening over last 7 days s/p small cut when gardening. Pt reports chills.        Past Medical History[1]   Past Surgical History[2]   Family History[3]   Social History[4]   E-Cigarette/Vaping    E-Cigarette Use Never User       E-Cigarette/Vaping Substances    Nicotine No     THC No     CBD No     Flavoring No     Other No     Unknown No       I have reviewed and agree with the history as documented.     79-year-old female with history of hypertension, A-fib presents with right hand swelling x 7 days.  Patient is right-hand dominant.  States 7 days ago she was in the woods, feeding animals when she noticed a puncture wound to her right thenar eminence. States the next day she noticed a white bump on the area, of which she tried to drain the area by poking with a pin that was not sterilized at that time. Over the next couple of days she developed increased swelling and redness of the right hand. States she initially thought it was her arthritis, though she states the swelling has greatly increased over the past day and is more diffuse than her arthritis flares usually are. Reports chills, though has not taken her temperature at home. Took tylenol last night with no relief of symptoms. States she tries not to move her hand as this is painful, reports full sensation.  Denies chest pain, shortness of breath, palpitations, headache, lightheadedness or dizziness, nausea or vomiting.          Review of Systems   Constitutional:  Negative for chills and fever.   HENT:  Negative for ear pain and sore throat.    Eyes:  Negative for pain and visual disturbance.   Respiratory:  Negative for cough and shortness of breath.    Cardiovascular:  Negative for chest pain and palpitations.   Gastrointestinal:  Negative for abdominal pain and vomiting.    Genitourinary:  Negative for dysuria and hematuria.   Musculoskeletal:  Negative for arthralgias and back pain.   Skin:  Positive for color change and wound. Negative for rash.   Neurological:  Negative for seizures and syncope.   All other systems reviewed and are negative.          Objective       ED Triage Vitals [05/29/25 1546]   Temperature Pulse Blood Pressure Respirations SpO2 Patient Position - Orthostatic VS   99.4 °F (37.4 °C) 58 162/75 18 98 % Sitting      Temp Source Heart Rate Source BP Location FiO2 (%) Pain Score    Oral Monitor Left arm -- 10 - Worst Possible Pain      Vitals      Date and Time Temp Pulse SpO2 Resp BP Pain Score FACES Pain Rating User   05/29/25 1700 99.2 °F (37.3 °C) 59 96 % 17 169/73 -- --    05/29/25 1600 99.4 °F (37.4 °C) 63 99 % 18 193/77 -- --    05/29/25 1546 99.4 °F (37.4 °C) 58 98 % 18 162/75 10 - Worst Possible Pain -- AM            Physical Exam  Vitals and nursing note reviewed.   Constitutional:       General: She is not in acute distress.     Appearance: She is not ill-appearing.   HENT:      Head: Normocephalic and atraumatic.      Right Ear: External ear normal.      Left Ear: External ear normal.      Nose: Nose normal.      Mouth/Throat:      Pharynx: Oropharynx is clear. No oropharyngeal exudate or posterior oropharyngeal erythema.     Eyes:      General: No scleral icterus.     Conjunctiva/sclera: Conjunctivae normal.       Cardiovascular:      Rate and Rhythm: Normal rate.      Pulses: Normal pulses.   Pulmonary:      Effort: Pulmonary effort is normal. No respiratory distress.   Abdominal:      General: There is no distension.      Palpations: Abdomen is soft.      Tenderness: There is no abdominal tenderness. There is no guarding or rebound.     Musculoskeletal:         General: Swelling and tenderness present.      Cervical back: Normal range of motion. No rigidity.      Right lower leg: No edema.      Left lower leg: No edema.      Comments: Noted  circumferential swelling and erythema of right hand and wrist, limited motor exam due to pain. + Kanavels sign with pain on passive extension of thumb. +2 radial pulses, no sensory deficit.      Skin:     General: Skin is warm and dry.      Findings: Erythema and lesion present.     Neurological:      General: No focal deficit present.      Mental Status: She is alert and oriented to person, place, and time.      Sensory: No sensory deficit.     Psychiatric:         Mood and Affect: Mood normal.         Behavior: Behavior normal.         Results Reviewed       Procedure Component Value Units Date/Time    Comprehensive metabolic panel [[id number]]  (Abnormal) Collected: 05/29/25 1623    Lab Status: Final result Specimen: Blood from Arm, Left Updated: 05/29/25 1658     Sodium 138 mmol/L      Potassium 4.7 mmol/L      Chloride 103 mmol/L      CO2 26 mmol/L      ANION GAP 9 mmol/L      BUN 18 mg/dL      Creatinine 1.17 mg/dL      Glucose 82 mg/dL      Calcium 9.4 mg/dL      AST 28 U/L      ALT 18 U/L      Alkaline Phosphatase 116 U/L      Total Protein 7.3 g/dL      Albumin 4.1 g/dL      Total Bilirubin 0.45 mg/dL      eGFR 44 ml/min/1.73sq m     Narrative:      National Kid[employer] Disease Foundation guidelines for Chronic Kidney Disease (CKD):     Stage 1 with normal or high GFR (GFR > 90 mL/min/1.73 square meters)    Stage 2 Mild CKD (GFR = 60-89 mL/min/1.73 square meters)    Stage 3A Moderate CKD (GFR = 45-59 mL/min/1.73 square meters)    Stage 3B Moderate CKD (GFR = 30-44 mL/min/1.73 square meters)    Stage 4 Severe CKD (GFR = 15-29 mL/min/1.73 square meters)    Stage 5 End Stage CKD (GFR <15 mL/min/1.73 square meters)  Note: GFR calculation is accurate only with a steady state creatinine    CBC and differential [[id number]]  (Abnormal) Collected: 05/29/25 1623    Lab Status: Final result Specimen: Blood from Arm, Left Updated: 05/29/25 1632     WBC 8.17 Thousand/uL      RBC 3.86 Million/uL      Hemoglobin 12.3 g/dL       "Hematocrit 37.9 %      MCV 98 fL      MCH 31.9 pg      MCHC 32.5 g/dL      RDW 11.3 %      MPV 10.9 fL      Platelets 324 Thousands/uL      nRBC 0 /100 WBCs      Segmented % 81 %      Immature Grans % 1 %      Lymphocytes % 12 %      Monocytes % 6 %      Eosinophils Relative 0 %      Basophils Relative 0 %      Absolute Neutrophils 6.64 Thousands/µL      Absolute Immature Grans 0.05 Thousand/uL      Absolute Lymphocytes 0.95 Thousands/µL      Absolute Monocytes 0.47 Thousand/µL      Eosinophils Absolute 0.03 Thousand/µL      Basophils Absolute 0.03 Thousands/µL             XR hand 3+ views RIGHT    (Results Pending)       Procedures    ED Medication and Procedure Management   Prior to Admission Medications   Prescriptions Last Dose Informant Patient Reported? Taking?   Acetaminophen (TYLENOL ARTHRITIS PAIN PO)   Yes No   Sig: Take by mouth if needed   Ascorbic Acid (vitamin C) 1000 MG tablet   Yes No   Sig: Take 1,000 mg by mouth in the morning.   Cholecalciferol (VITAMIN D3) 50 MCG (2000 UT) TABS  Self Yes No   Sig: Take 2,000 Units by mouth in the morning.   HYDROcodone-acetaminophen (Norco) 5-325 mg per tablet   No No   Sig: Take 1 tablet by mouth every 6 (six) hours as needed for pain Max Daily Amount: 4 tablets   Patient not taking: Reported on 12/30/2024   Tuberculin-Allergy Syringes (ALLERGY SYRINGE 1CC/27GX1/2\") 27G X 1/2\" 1 ML MISC   Yes No   Sig: Use Receives allergy injections once a month through allergist   acetaminophen (TYLENOL) 325 mg tablet  Self Yes No   Sig: Take 650 mg by mouth every 6 (six) hours as needed for mild pain   ammonium lactate (LAC-HYDRIN) 12 % lotion   No No   Sig: Apply topically 2 (two) times a day as needed for dry skin   dabigatran etexilate (PRADAXA) 150 mg capsu   No No   Sig: Take 1 capsule (150 mg total) by mouth 2 (two) times a day   diltiazem (DILACOR XR) 120 MG 24 hr capsule   No No   Sig: Take 1 capsule (120 mg total) by mouth daily   ferrous gluconate (FERGON) 324 mg " tablet   Yes No   Sig: Take 324 mg by mouth daily with breakfast   fluticasone (FLONASE) 50 mcg/act nasal spray  Self Yes No   Sig: into each nostril   multivitamin (THERAGRAN) TABS  Self Yes No   Sig: Take 1 tablet by mouth in the morning.   omeprazole (PriLOSEC) 20 mg delayed release capsule   No No   Sig: take 1 capsule by mouth once daily   omeprazole (PriLOSEC) 40 MG capsule   No No   Sig: Take 1 capsule (40 mg total) by mouth daily   vitamin B-12 (CYANOCOBALAMIN) 100 MCG TABS   Yes No   Sig: Take 50 mcg by mouth in the morning.      Facility-Administered Medications: None     Patient's Medications   Discharge Prescriptions    No medications on file     No discharge procedures on file.  ED SEPSIS DOCUMENTATION   Time reflects when diagnosis was documented in both MDM as applicable and the Disposition within this note       Time User Action Codes Description Comment    5/29/2025  5:02 PM Mckenzie Esparza Add [M79.89] Swelling of right hand     5/29/2025  5:03 PM Mckenzie Esparza Add [S61.431A] Puncture wound of right hand with complication, initial encounter     5/29/2025  5:03 PM Mckenzie Esparza Modify [M79.89] Swelling of right hand     5/29/2025  5:03 PM Mckenzie Esparza Modify [S61.431A] Puncture wound of right hand with complication, initial encounter                    [1]   Past Medical History:  Diagnosis Date    Cellulitis of right hand 05/10/2022    Closed fracture of right proximal humerus 02/15/2020    GERD (gastroesophageal reflux disease)     NSTEMI (non-ST elevated myocardial infarction) ([facility]) 02/16/2020    due to demand ischemia post fall/fracture    Osteopenia 10/23/2017    PAF (paroxysmal atrial fibrillation)     Syncope    [2]   Past Surgical History:  Procedure Laterality Date    APPENDECTOMY      CATARACT EXTRACTION      LAPAROSCOPIC APPENDECTOMY      incidental     [place] TENDON SHEATH INCISION Left 2/26/2019    Procedure: LONG FINGER TRIGGER FINGER RELEASE;  Surgeon: Ash Louis MD;   Location: [facility];  Service: Orthopedics    TONSILLECTOMY      TUBAL LIGATION     [3]   Family History  Problem Relation Name Age of Onset    Lung cancer Mother      Hypertension Mother      Cancer Mother      Cerebral aneurysm Father          bleed    Alcohol abuse Father      Cirrhosis Father      Other Father          had fallen    Other Sister          unkownn cause     Other Sister          in MVA    No Known Problems Daughter      No Known Problems Daughter      No Known Problems Maternal Grandmother      No Known Problems Maternal Grandfather      No Known Problems Paternal Grandmother      No Known Problems Paternal Grandfather      Pneumonia Brother          infancy of pneumonia    No Known Problems Son     [4]   Social History  Tobacco Use    Smoking status: Former     Current packs/day: 1.00     Types: Cigarettes    Smokeless tobacco: Never    Tobacco comments:     age [age]    Vaping Use    Vaping status: Never Used   Substance Use Topics    Alcohol use: Yes     Alcohol/week: 10.0 standard drinks of alcohol     Types: 10 Glasses of wine per week     Comment: 3glasses wine daily    Drug use: No        Mckenzie Esparza PA-C  05/29/25 7448

## 2025-05-29 NOTE — PROGRESS NOTES
"Name: Margarito Nieves      : 1945      MRN: [id number]  Encounter Provider: Mariana Tom PA-C  Encounter Date: 2025   Encounter department: [facility]  :  Assessment & Plan  Cellulitis of right upper extremity    Orders:    Transfer to other facility    Significant swelling to right hand for 7 days after possible bite or small stab wound while gardening. Pain radiating up arm to shoulder. Concern for cellulitis and need for IV abx. Will place transfer of care to ED and send patient to [facility].    History of Present Illness   HPI  Margarito Nieves is a 79 y.o. female who presents with significant swelling of hand worsening for 7 days after she found a puncture wound after feeding animals in the woods.        Review of Systems   Musculoskeletal:  Positive for arthralgias and joint swelling.          Objective   /67 (Patient Position: Sitting, Cuff Size: Standard)   Pulse 60   Temp 98.9 °F (37.2 °C) (Temporal)   Resp 16   Ht 5' 4\" (1.626 m)   Wt 56.7 kg (125 lb)   SpO2 98%   BMI 21.46 kg/m²      Physical Exam  Constitutional:       Appearance: Normal appearance.   HENT:      Head: Normocephalic and atraumatic.     Cardiovascular:      Rate and Rhythm: Normal rate.   Pulmonary:      Effort: Pulmonary effort is normal.     Musculoskeletal:         General: Swelling and tenderness present.     Skin:     General: Skin is warm.     Neurological:      Mental Status: She is alert.           "

## 2025-05-29 NOTE — Clinical Note
Case was discussed with Dr. Tirado and the patient's admission status was agreed to be Admission Status: observation status to the service of Dr. Tirado .

## 2025-05-29 NOTE — ASSESSMENT & PLAN NOTE
"Patient reports she developed sudden swelling in her right hand that started about 5 days ago.  She is unclear how it happened.  She speculated that she may have gotten \"bitten by something\" when she was feeding the birds and chipmunks.  She further added that she noticed a raised area at the base of her left thumb about 2 days ago and she decided to stick a needle in it to try to rest what ever was in there.  She said nothing came out of her hand.  She repeated the of the needle yesterday and again nothing returned.  She developed worsening swelling in her hand overnight and pain.  She went to urgent care who sent her here to the ED for further evaluation and treatment.    Obtain blood cultures x 2  Right hand x-ray read pending  Procalcitonin pending  No leukocytosis  Received vancomycin in the ED  Will initiate Unasyn on the [facility] floor  Consult orthopedics  Consult ID  Adding pain regimen, monitor effectiveness  CBC a.m.  "

## 2025-05-30 ENCOUNTER — APPOINTMENT (INPATIENT)
Dept: CT IMAGING | Facility: HOSPITAL | Age: 80
DRG: 605 | End: 2025-05-30
Payer: COMMERCIAL

## 2025-05-30 PROBLEM — R50.9 FEVER: Status: ACTIVE | Noted: 2025-05-30

## 2025-05-30 LAB
ANION GAP SERPL CALCULATED.3IONS-SCNC: 7 MMOL/L (ref 4–13)
BASOPHILS # BLD AUTO: 0.02 THOUSANDS/ÂΜL (ref 0–0.1)
BASOPHILS NFR BLD AUTO: 0 % (ref 0–1)
BUN SERPL-MCNC: 13 MG/DL (ref 5–25)
CALCIUM SERPL-MCNC: 8.9 MG/DL (ref 8.4–10.2)
CHLORIDE SERPL-SCNC: 101 MMOL/L (ref 96–108)
CO2 SERPL-SCNC: 26 MMOL/L (ref 21–32)
CREAT SERPL-MCNC: 0.97 MG/DL (ref 0.6–1.3)
EOSINOPHIL # BLD AUTO: 0.01 THOUSAND/ÂΜL (ref 0–0.61)
EOSINOPHIL NFR BLD AUTO: 0 % (ref 0–6)
ERYTHROCYTE [DISTWIDTH] IN BLOOD BY AUTOMATED COUNT: 11.2 % (ref 11.6–15.1)
GFR SERPL CREATININE-BSD FRML MDRD: 55 ML/MIN/1.73SQ M
GLUCOSE SERPL-MCNC: 166 MG/DL (ref 65–140)
HCT VFR BLD AUTO: 34 % (ref 34.8–46.1)
HGB BLD-MCNC: 11 G/DL (ref 11.5–15.4)
IMM GRANULOCYTES # BLD AUTO: 0.03 THOUSAND/UL (ref 0–0.2)
IMM GRANULOCYTES NFR BLD AUTO: 1 % (ref 0–2)
LYMPHOCYTES # BLD AUTO: 0.65 THOUSANDS/ÂΜL (ref 0.6–4.47)
LYMPHOCYTES NFR BLD AUTO: 11 % (ref 14–44)
MCH RBC QN AUTO: 31.5 PG (ref 26.8–34.3)
MCHC RBC AUTO-ENTMCNC: 32.4 G/DL (ref 31.4–37.4)
MCV RBC AUTO: 97 FL (ref 82–98)
MONOCYTES # BLD AUTO: 0.36 THOUSAND/ÂΜL (ref 0.17–1.22)
MONOCYTES NFR BLD AUTO: 6 % (ref 4–12)
NEUTROPHILS # BLD AUTO: 5.05 THOUSANDS/ÂΜL (ref 1.85–7.62)
NEUTS SEG NFR BLD AUTO: 82 % (ref 43–75)
NRBC BLD AUTO-RTO: 0 /100 WBCS
PLATELET # BLD AUTO: 280 THOUSANDS/UL (ref 149–390)
PMV BLD AUTO: 10.2 FL (ref 8.9–12.7)
POTASSIUM SERPL-SCNC: 3.9 MMOL/L (ref 3.5–5.3)
PROCALCITONIN SERPL-MCNC: 0.09 NG/ML
RBC # BLD AUTO: 3.49 MILLION/UL (ref 3.81–5.12)
SODIUM SERPL-SCNC: 134 MMOL/L (ref 135–147)
WBC # BLD AUTO: 6.12 THOUSAND/UL (ref 4.31–10.16)

## 2025-05-30 PROCEDURE — 99223 1ST HOSP IP/OBS HIGH 75: CPT | Performed by: INTERNAL MEDICINE

## 2025-05-30 PROCEDURE — 80048 BASIC METABOLIC PNL TOTAL CA: CPT

## 2025-05-30 PROCEDURE — 85025 COMPLETE CBC W/AUTO DIFF WBC: CPT

## 2025-05-30 PROCEDURE — 99222 1ST HOSP IP/OBS MODERATE 55: CPT | Performed by: STUDENT IN AN ORGANIZED HEALTH CARE EDUCATION/TRAINING PROGRAM

## 2025-05-30 PROCEDURE — 73200 CT UPPER EXTREMITY W/O DYE: CPT

## 2025-05-30 PROCEDURE — 99232 SBSQ HOSP IP/OBS MODERATE 35: CPT

## 2025-05-30 PROCEDURE — 84145 PROCALCITONIN (PCT): CPT

## 2025-05-30 RX ORDER — HYDROCODONE BITARTRATE AND ACETAMINOPHEN 5; 325 MG/1; MG/1
1 TABLET ORAL EVERY 6 HOURS PRN
Refills: 0 | Status: DISCONTINUED | OUTPATIENT
Start: 2025-05-30 | End: 2025-05-31 | Stop reason: HOSPADM

## 2025-05-30 RX ORDER — HYDROCODONE BITARTRATE AND ACETAMINOPHEN 5; 325 MG/1; MG/1
2 TABLET ORAL EVERY 6 HOURS PRN
Refills: 0 | Status: DISCONTINUED | OUTPATIENT
Start: 2025-05-30 | End: 2025-05-31 | Stop reason: HOSPADM

## 2025-05-30 RX ADMIN — SODIUM CHLORIDE 3 G: 9 INJECTION, SOLUTION INTRAVENOUS at 02:54

## 2025-05-30 RX ADMIN — SODIUM CHLORIDE 3 G: 9 INJECTION, SOLUTION INTRAVENOUS at 13:00

## 2025-05-30 RX ADMIN — SODIUM CHLORIDE 3 G: 9 INJECTION, SOLUTION INTRAVENOUS at 08:24

## 2025-05-30 RX ADMIN — SODIUM CHLORIDE 3 G: 9 INJECTION, SOLUTION INTRAVENOUS at 20:03

## 2025-05-30 NOTE — CONSULTS
Consultation - Infectious Disease   Name: Margarito Nieves 79 y.o. female I MRN: [id number]  Unit/Bed#: -01 I Date of Admission: 5/29/2025   Date of Service: 5/30/2025 I Hospital Day: 1     Inpatient consult to Infectious Diseases  Consult performed by: LUCRECIA Harvey  Consult ordered by: LUCRECIA Larsen      Physician Requesting Evaluation: Jer Tirado DO   Reason for Evaluation / Principal Problem: puncture wound of right hand with complication, initial encounter    Administrative Statements   VIRTUAL CARE DOCUMENTATION:     1. This service was provided via Telemedicine using [employer]     2. Parties in the room with patient during teleconsult Patient only    3. Confidentiality My office door was closed     4. Participants No one else was in the room    5. Patient acknowledged consent and understanding of privacy and security of the  Telemedicine consult. I informed the patient that I have reviewed their record in Epic and presented the opportunity for them to ask any questions regarding the visit today.  The patient agreed to participate.    6. I have spent a total time of 60 minutes in caring for this patient on the day of the visit/encounter including Diagnostic results, Instructions for management, Patient and family education, Impressions, Counseling / Coordination of care, Documenting in the medical record, Reviewing/placing orders in the medical record (including tests, medications, and/or procedures), Obtaining or reviewing history, and Communicating with other healthcare professionals, not including the time spent for establishing the audio/video connection.     Assessment & Plan  Puncture wound of finger of right hand with complication  Unclear of how initial puncture occurred. Patient spends time outdoors feeding birds, squirrels, and chipmunks. Often extends her hand into bushes and logs/tree stumps to leave food for the animals. Has been pricked by thorny branches  and had a splinter near the base of her thumb recently which she removed with a pin. Additionally used a needle to dig around at the base of her thumb to see if she could release any fluid from the area that was swollen but saw no fluid/pus. Patient also has 4 cats, one of which will playfully scratch at her and dose lick her hand as well. Cats are healthy and are all acting normally, unsure if their rabies are up to date but she reports they are not exposed to outside animals. Her last Tdap was in 5/2022. I personally reviewed her R hand xray which showed no acute osseous abnormality. Given extensive soft tissue swelling extending to the wrist and forearm, recommend CT w contrast for further evaluation of RUE for possible underlying abscess and tendon involvement. The patient has been started on IV unasyn which she appears to be tolerating without difficulty. She has had ongoing chills and isolated fever, however her WBC count remains stable. ROM of the R hand and wrist are significantly improved this morning. I recommend the patient remain on IV unasyn for now.   -continue IV unasyn  -if patient clinically declines, or the R hand worsens, recommend adding IV vancomycin  -check CBCD and CMP tomorrow  -follow up blood cultures  -recommend CT RUE w contrast to further assess for underlying abscess and tendon involvement in the R hand/wrist  -monitor vitals  -serial RUE exams  -follow up orthopedic surgery consult   Fever  Likely secondary to R hand infection above. No other clear source appreciated. No active respiratory, GI, or  symptoms. No upper respiratory or sinus symptoms to suggest viral illness. Blood cultures are pending.   -antibiotic as above  -check CBCD and CMP tomorrow  -monitor vitals    The patient will be formally reassessed by the infectious disease team on Monday, 6/2/2025. Please call sooner with new questions or concerns.    Above plan was discussed in detail with patient over the TV  "kit.  Above plan was discussed in detail with SLIM who agree with plan for ongoing antibiotic treatment.     History   HPI: Margarito Nieves is a 79 y.o. year old female who presented to the [facility] ED on 5/29/2025 due to swelling and puncture wound of her R hand. Patient unclear of how the wound occurred. She had been feeding birds and chipmunks. She reports she may have been \"bitten by something.\" Also has cats with once recent scratch and a cat licking her hands. She noticed a raised around at the base of her thumb and used a needle at home, several times, to attempt to drain the area but had no fluid return.    Upon arrival to the ED the patient's temperature was 99.4. HR was 58. RR was 18. O2 saturation was 98% on room air. BP was 162/75. WBC count was 8.17. Creatinine was 1.17 with GFR = 44. Alk phos was elevated. The patient was taken for an xray of the R hand which showed no acute osseous abnormalities. The patient was given vancomycin. She was admitted for additional medical management.    After her admission the patient was transitioned to Unasyn as her antibiotic regimen. Blood cultures were sent. Orthopedic surgery was consulted. We have been asked for formal consult for puncture wound of right hand with complication, initial encounter.    The patient has HTN, a-fib, GERD, allergic rhinitis, heart palpitations, RA, osteoporosis, anemia, SOA, Mckenna's esophagus, and seasonal allergies. She has a history of NSTEMI, synocope, R proximal humerus fracture, cellulitis, cataract extraction, abdominal bloating, bradycardia, dermatitis, early satiety, L hip pain, femur fracture, lung density on xray, abnormal EKG, tubal ligation, tonsillectomy, appendectomy, ORIF L femur, and L long finger trigger finger release. She is a former smoker. She has allergies to cat dander, dust mite extract, and IV dye. Also reports a possible reaction to chlorhexadine.    Review of Systems  Patient reports she's feeling a " "bit better since her admission last night. She did have a fever and has had intermittent chills, including at time of my visit. She feels the swelling of her R hand and wrist is slightly less and feels her R wrist and finger ROM is improving. Still feels \"the joints are swollen\" but is pleased with the movement she's getting this morning. She denies sore throat, runny nose, sinus congestion. She denies difficulty breathing, cough, chest pain, and SOB. She denies nausea, vomiting, abdominal pain, diarrhea. She's been tolerating oral intake without difficulty. She denies dysuria. Rest of complete 12 point system-based review of systems is otherwise negative.    PAST MEDICAL HISTORY:    Diagnosis Date    Cellulitis of right hand 05/10/2022    Closed fracture of right proximal humerus 02/15/2020    GERD (gastroesophageal reflux disease)     NSTEMI (non-ST elevated myocardial infarction) ([facility]) 02/16/2020    due to demand ischemia post fall/fracture    Osteopenia 10/23/2017    PAF (paroxysmal atrial fibrillation)     Syncope      Procedure Laterality Date    APPENDECTOMY      CATARACT EXTRACTION      LAPAROSCOPIC APPENDECTOMY      incidental     [place] TENDON SHEATH INCISION Left 2/26/2019    Procedure: LONG FINGER TRIGGER FINGER RELEASE;  Surgeon: Ash Louis MD;  Location: [facility];  Service: Orthopedics    TONSILLECTOMY      TUBAL LIGATION       FAMILY HISTORY:  Non-contributory    SOCIAL HISTORY:  Social History   /Civil Union  Social History     Substance and Sexual Activity   Alcohol Use Yes    Alcohol/week: 10.0 standard drinks of alcohol    Types: 10 Glasses of wine per week    Comment: 3glasses wine daily     Social History     Substance and Sexual Activity   Drug Use No     Tobacco Use   Smoking Status Former    Current packs/day: 1.00    Types: Cigarettes   Smokeless Tobacco Never   Tobacco Comments    age [age]      ALLERGIES:  Allergen Reactions    Cat Dander     Dust Mite Extract     Iv Dye  " [Iodinated Contrast Media] Hives     Category: Allergy;     Other      Possible reaction to chlorhexadine, but this occurred days after and in other areas of the body than just the area prepped    Pollen Extract      Seasonal allergies     MEDICATIONS:  All current active medications have been reviewed.    ANTIBIOTICS:  Unasyn 2  Antibiotics 2    Physical Exam     Temp:  [98.1 °F (36.7 °C)-100.5 °F (38.1 °C)] 98.1 °F (36.7 °C)  HR:  [53-75] 75  Resp:  [16-20] 18  BP: (118-193)/(62-77) 140/73  SpO2:  [96 %-99 %] 96 %  Temp (24hrs), Av °F (37.2 °C), Min:98.1 °F (36.7 °C), Max:100.5 °F (38.1 °C)  Current: Temperature: 98.1 °F (36.7 °C)  No intake or output data in the 24 hours ending 25 0700    Physical exam findings reported by bedside and primary medical team staff, also observed over TV kit:  General Appearance:  Appearing well, nontoxic, and in no distress. Anxious. She appears comfortable sitting up in bed.    Head:  Normocephalic, without obvious abnormality, atraumatic.   Eyes:  Conjunctiva pink and sclera anicteric, both eyes.   Nose: Nares normal, mucosa normal, no drainage.   Mouth/Throat: Oropharynx moist without lesions. Some missing teeth.   Neck: Supple, symmetrical, no adenopathy, no tenderness/mass/nodules.   Back:   Symmetric, ROM normal, no CVA tenderness.   Lungs:   Clear to auscultation bilaterally, respirations unlabored on room air.   Chest Wall:  No tenderness or deformity.   Heart:  Irregular; no murmur, rub or gallop.   Abdomen:   Soft, rounded, non-tender, non-distended, positive bowel sounds throughout.   Extremities: R hand/wrist/distal forearm with edema and some overlying erythema. Scab intact at base of R thumb with surrounding erythema. R palmar hand and wrist region somewhat tender when palpated. +2 palpable R radial pulse.   Skin: No rashes noted on exposed skin. Generalized warm and dry.   Lymph nodes: Cervical, supraclavicular nodes normal.   Neurologic: Alert and oriented  times 3, follows commands, speech is organized and appropriate, symmetric facial movement.     Invasive Devices:   Peripheral IV 05/29/25 Left;Ventral (anterior) Forearm (Active)   Site Assessment WDL 05/30/25 0400   Dressing Type Transparent 05/30/25 0400   Line Status Flushed 05/30/25 0400   Dressing Status Old drainage 05/30/25 0400     Labs, Imaging, & Other Studies   I have personally reviewed pertinent labs.    Results from last 7 days   Lab Units 05/30/25  0506 05/29/25  1623   WBC Thousand/uL 6.12 8.17   HEMOGLOBIN g/dL 11.0* 12.3   PLATELETS Thousands/uL 280 324     Results from last 7 days   Lab Units 05/30/25  0506 05/29/25  1623   POTASSIUM mmol/L 3.9 4.7   CHLORIDE mmol/L 101 103   CO2 mmol/L 26 26   BUN mg/dL 13 18   CREATININE mg/dL 0.97 1.17   EGFR ml/min/1.73sq m 55 44   CALCIUM mg/dL 8.9 9.4   AST U/L  --  28   ALT U/L  --  18   ALK PHOS U/L  --  116*     Results from last 7 days   Lab Units 05/29/25  1831   BLOOD CULTURE  Received in Microbiology Lab. Culture in Progress.  Received in Microbiology Lab. Culture in Progress.     Imaging Studies:   I have personally reviewed pertinent imaging study reports and images in PACS.    XR HAND 3+ VIEWS RIGHT 5/29/2025 - I personally reviewed this study which showed no fracture or dislocation. No lytic or blastic lesion. Unremarkable soft tissue.    Other Studies:   Blood cultures are pending.

## 2025-05-30 NOTE — CONSULTS
Consultation - Orthopedics   Name: Margarito Nieves 79 y.o. female I MRN: [id number]  Unit/Bed#: -01 I Date of Admission: 5/29/2025   Date of Service: 5/30/2025 I Hospital Day: 1   Inpatient consult to Orthopedic Surgery  Consult performed by: Jer Guerra PA-C  Consult ordered by: LUCRECIA Larsen        Physician Requesting Evaluation: Jer Tirado DO   Reason for Evaluation / Principal Problem: right hand pain and swelling    Assessment & Plan  Puncture wound of finger of right hand with complication  79 year old female with right hand pain, redness, and swelling for 6 days  Continue IV abx per primary team  Nonweightbearing to right upper extremity  Elevate affected extremity above heart level  Gentle compression with ACE bandage  Monitor exam findings  No concern for deep infection based on exam at this time  Analgesics for pain per primary team  Consider CT if concern for deeper infection or clinical deterioration   No acute hand surgery intervention indicated at this time  Dispo: Ortho will follow      This consult was completed with Dr Landry, the hand surgery attending on call.  Benign essential hypertension    GERD (gastroesophageal reflux disease)    Paroxysmal A-fib (HCC)    Orthopedics service will follow.    History of Present Illness   HPI: Margarito Nieves is a right-hand-dominant 79 y.o. year old female with a past medical history of rheumatoid arthritis, hypertension, GERD, and Afib presented with erythema, swelling, and pain in her right hand that started 5 or 6 days ago.  Patient states she was reaching into a log to feed the birds and chipmunks in her yard.  Patient states she is unsure if she was bitten by one of the animals or if she was stuck by a thorn.  Patient states that about 2 or 3 days ago she noticed swelling, redness, and pain over the thenar eminence, dorsal thumb, dorsal second MCP, and dorsal third MCP.  Patient states that she subsequently attempted to  stick a safety pin needle into the swelling over the thenar area without success.  Patient states the swelling redness worsened yesterday which prompted her to seek ED evaluation.  Patient was admitted last night to the [facility] service and a hand surgery consultation was placed.  Patient has been on IV antibiotics since admission.  Patient states her swelling, redness, and pain have improved since yesterday.  Patient states her pain is well-controlled on her current regimen.  Patient does note limited range of motion and tightness of all digits in her right hand.  Patient does complain of intermittent numbness and tingling over the tips of all 5 digits.  Patient denies any fevers.  Patient complains of intermittent chills.  Patient denies any open wounds, bleeding, or drainage.      Review of Systems significant for findings described in the HPI.  Historical Information   Past Medical History[1]  Past Surgical History[2]  Social History[3]  E-Cigarette/Vaping    E-Cigarette Use Never User      E-Cigarette/Vaping Substances    Nicotine No     THC No     CBD No     Flavoring No     Other No     Unknown No      Family History[4]    Objective :  Temp:  [98.1 °F (36.7 °C)-100.5 °F (38.1 °C)] 98.2 °F (36.8 °C)  HR:  [53-89] 89  BP: (118-193)/(62-77) 144/76  Resp:  [16-20] 18  SpO2:  [95 %-99 %] 95 %  O2 Device: None (Room air)  Physical ExamOrtho Exam   Musculoskeletal: right Hand  Skin: No obvious erythema upon inspection today.   Generalized swelling most significant throughout the thumb and small finger. No fusiform swelling  Dry, scabbed area with 2 closed puncture wounds noted over the thenar eminence  Limited ability to make a full composite fist actively.  Able to achieve full composite fist with passive assist without pain.  Mild pain with passive extension of all 5 digits  Tender to palpation throughout thumb and small finger over both extensor and flexor surfaces  Fingers not held in flexion  No Pain with Passive  Extension of all 5 digits  Sensation intact Radial, Ulna, and Median  5/5 motor to Radial, Ulna, and Median  2+ Radial pulse  Brisk cap refill  Compartments are soft and nontender    Physical Exam   Constitutional: Appears well-developed and well-nourished. No distress.   HENT:   Head: Normocephalic.   Eyes: Conjunctivae are normal. Right eye exhibits no discharge. Left eye exhibits no discharge. No scleral icterus.   Cardiovascular: Normal rate.    Pulmonary/Chest: Effort normal.   Neurological: Alert and oriented to person, place, and time.   Skin: As above   Psychiatric: Normal mood and affect. Behavior is normal. Judgment and thought content normal.         Lab Results: I have reviewed the following results:   Recent Labs     05/29/25  1623 05/30/25  0506   WBC 8.17 6.12   HGB 12.3 11.0*   HCT 37.9 34.0*    280   BUN 18 13   CREATININE 1.17 0.97     Blood Culture:   Lab Results   Component Value Date    BLOODCX Received in Microbiology Lab. Culture in Progress. 05/29/2025    BLOODCX Received in Microbiology Lab. Culture in Progress. 05/29/2025     Wound Culture:   Lab Results   Component Value Date    WOUNDCULT 2 colonies Pasteurella multocida (A) 06/26/2023     Clinical Images:  Bedside images obtained. Please see media for images.                [1]   Past Medical History:  Diagnosis Date    Cellulitis of right hand 05/10/2022    Closed fracture of right proximal humerus 02/15/2020    GERD (gastroesophageal reflux disease)     NSTEMI (non-ST elevated myocardial infarction) ([facility]) 02/16/2020    due to demand ischemia post fall/fracture    Osteopenia 10/23/2017    PAF (paroxysmal atrial fibrillation)     Syncope    [2]   Past Surgical History:  Procedure Laterality Date    APPENDECTOMY      CATARACT EXTRACTION      LAPAROSCOPIC APPENDECTOMY      incidental     [place] TENDON SHEATH INCISION Left 2/26/2019    Procedure: LONG FINGER TRIGGER FINGER RELEASE;  Surgeon: Ash Louis MD;  Location: [facility];   Service: Orthopedics    TONSILLECTOMY      TUBAL LIGATION     [3]   Social History  Tobacco Use    Smoking status: Former     Current packs/day: 1.00     Types: Cigarettes    Smokeless tobacco: Never    Tobacco comments:     age [age]    Vaping Use    Vaping status: Never Used   Substance and Sexual Activity    Alcohol use: Yes     Alcohol/week: 10.0 standard drinks of alcohol     Types: 10 Glasses of wine per week     Comment: 3glasses wine daily    Drug use: No    Sexual activity: Not Currently   [4]   Family History  Problem Relation Name Age of Onset    Lung cancer Mother      Hypertension Mother      Cancer Mother      Cerebral aneurysm Father          bleed    Alcohol abuse Father      Cirrhosis Father      Other Father          had fallen    Other Sister          unkownn cause     Other Sister          in MVA    No Known Problems Daughter      No Known Problems Daughter      No Known Problems Maternal Grandmother      No Known Problems Maternal Grandfather      No Known Problems Paternal Grandmother      No Known Problems Paternal Grandfather      Pneumonia Brother          infancy of pneumonia    No Known Problems Son

## 2025-05-30 NOTE — ASSESSMENT & PLAN NOTE
"Patient reports she developed sudden swelling in her right hand that started about 5 days ago.  She is unclear how it happened.  She speculated that she may have gotten \"bitten by something\" when she was feeding the birds and chipmunks.  She further added that she noticed a raised area at the base of her left thumb about 2 days ago and she decided to stick a needle in it to try to rest what ever was in there.  She said nothing came out of her hand.  She repeated the of the needle yesterday and again nothing returned.  She developed worsening swelling in her hand overnight and pain.  She went to urgent care who sent her here to the ED for further evaluation and treatment.    Blood cultures x 2 results pending  Right hand x-ray no osseous abnormality  Procalcitonin 0.07-->0.09  Received vancomycin in the ED  Continue Unasyn  Appreciate input of orthopedics-no surgical intervention, ice, elevate, gentle compression, continue antibiotics  Appreciate input of infectious disease-continue current antibiotics  CT right hand pending  Redness and swelling has greatly improved since yesterday, pain improved, will continue to monitor  CBC a.m.  "

## 2025-05-30 NOTE — ASSESSMENT & PLAN NOTE
79 year old female with right hand pain, redness, and swelling for 6 days  Continue IV abx per primary team  Nonweightbearing to right upper extremity  Elevate affected extremity above heart level  Gentle compression with ACE bandage  Monitor exam findings  No concern for deep infection based on exam at this time  Analgesics for pain per primary team  Consider CT if concern for deeper infection or clinical deterioration   No acute hand surgery intervention indicated at this time  Dispo: Ortho will follow      This consult was completed with Dr Landry, the hand surgery attending on call.

## 2025-05-30 NOTE — ASSESSMENT & PLAN NOTE
Unclear of how initial puncture occurred. Patient spends time outdoors feeding birds, squirrels, and chipmunks. Often extends her hand into bushes and logs/tree stumps to leave food for the animals. Has been pricked by thorny branches and had a splinter near the base of her thumb recently which she removed with a pin. Additionally used a needle to dig around at the base of her thumb to see if she could release any fluid from the area that was swollen but saw no fluid/pus. Patient also has 4 cats, one of which will playfully scratch at her and dose lick her hand as well. Cats are healthy and are all acting normally, unsure if their rabies are up to date but she reports they are not exposed to outside animals. Her last Tdap was in 5/2022. I personally reviewed her R hand xray which showed no acute osseous abnormality. Given extensive soft tissue swelling extending to the wrist and forearm, recommend CT w contrast for further evaluation of RUE for possible underlying abscess and tendon involvement. The patient has been started on IV unasyn which she appears to be tolerating without difficulty. She has had ongoing chills and isolated fever, however her WBC count remains stable. ROM of the R hand and wrist are significantly improved this morning. I recommend the patient remain on IV unasyn for now.   -continue IV unasyn  -if patient clinically declines, or the R hand worsens, recommend adding IV vancomycin  -check CBCD and CMP tomorrow  -follow up blood cultures  -recommend CT RUE w contrast to further assess for underlying abscess and tendon involvement in the R hand/wrist  -monitor vitals  -serial RUE exams  -follow up orthopedic surgery consult

## 2025-05-30 NOTE — ASSESSMENT & PLAN NOTE
Tmax 100.5 last 24 hours  See plan for puncture wound right hand  Continue to monitor  Tylenol as needed fever

## 2025-05-30 NOTE — PLAN OF CARE
Problem: PAIN - ADULT  Goal: Verbalizes/displays adequate comfort level or baseline comfort level  Description: Interventions:  - Encourage patient to monitor pain and request assistance  - Assess pain using appropriate pain scale  - Administer analgesics as ordered based on type and severity of pain and evaluate response  - Implement non-pharmacological measures as appropriate and evaluate response  - Consider cultural and social influences on pain and pain management  - Notify physician/advanced practitioner if interventions unsuccessful or patient reports new pain  - Educate patient/family on pain management process including their role and importance of  reporting pain   - Provide non-pharmacologic/complimentary pain relief interventions  Outcome: Progressing     Problem: INFECTION - ADULT  Goal: Absence or prevention of progression during hospitalization  Description: INTERVENTIONS:  - Assess and monitor for signs and symptoms of infection  - Monitor lab/diagnostic results  - Monitor all insertion sites, i.e. indwelling lines, tubes, and drains  - Monitor endotracheal if appropriate and nasal secretions for changes in amount and color  - Celoron appropriate cooling/warming therapies per order  - Administer medications as ordered  - Instruct and encourage patient and family to use good hand hygiene technique  - Identify and instruct in appropriate isolation precautions for identified infection/condition  Outcome: Progressing  Goal: Absence of fever/infection during neutropenic period  Description: INTERVENTIONS:  - Monitor WBC  - Perform strict hand hygiene  - Limit to healthy visitors only  - No plants, dried, fresh or silk flowers with sharpe in patient room  Outcome: Progressing     Problem: SAFETY ADULT  Goal: Patient will remain free of falls  Description: INTERVENTIONS:  - Educate patient/family on patient safety including physical limitations  - Instruct patient to call for assistance with activity   -  Consider consulting OT/PT to assist with strengthening/mobility based on AM PAC & JH-HLM score  - Consult OT/PT to assist with strengthening/mobility   - Keep Call bell within reach  - Keep bed low and locked with side rails adjusted as appropriate  - Keep care items and personal belongings within reach  - Initiate and maintain comfort rounds  - Make Fall Risk Sign visible to staff  - Offer Toileting every  Hours, in advance of need  - Initiate/Maintain alarm  - Obtain necessary fall risk management equipment:   - Apply yellow socks and bracelet for high fall risk patients  - Consider moving patient to room near nurses station  Outcome: Progressing  Goal: Maintain or return to baseline ADL function  Description: INTERVENTIONS:  -  Assess patient's ability to carry out ADLs; assess patient's baseline for ADL function and identify physical deficits which impact ability to perform ADLs (bathing, care of mouth/teeth, toileting, grooming, dressing, etc.)  - Assess/evaluate cause of self-care deficits   - Assess range of motion  - Assess patient's mobility; develop plan if impaired  - Assess patient's need for assistive devices and provide as appropriate  - Encourage maximum independence but intervene and supervise when necessary  - Involve family in performance of ADLs  - Assess for home care needs following discharge   - Consider OT consult to assist with ADL evaluation and planning for discharge  - Provide patient education as appropriate  - Monitor functional capacity and physical performance, use of AM PAC & JH-HLM   - Monitor gait, balance and fatigue with ambulation    Outcome: Progressing  Goal: Maintains/Returns to pre admission functional level  Description: INTERVENTIONS:  - Perform AM-PAC 6 Click Basic Mobility/ Daily Activity assessment daily.  - Set and communicate daily mobility goal to care team and patient/family/caregiver.   - Collaborate with rehabilitation services on mobility goals if consulted  -  Perform Range of Motion  times a day.  - Reposition patient every  hours.  - Dangle patient  times a day  - Stand patient  times a day  - Ambulate patient  times a day  - Out of bed to chair  times a day   - Out of bed for meals times a day  - Out of bed for toileting  - Record patient progress and toleration of activity level   Outcome: Progressing     Problem: DISCHARGE PLANNING  Goal: Discharge to home or other facility with appropriate resources  Description: INTERVENTIONS:  - Identify barriers to discharge w/patient and caregiver  - Arrange for needed discharge resources and transportation as appropriate  - Identify discharge learning needs (meds, wound care, etc.)  - Arrange for interpretive services to assist at discharge as needed  - Refer to Case Management Department for coordinating discharge planning if the patient needs post-hospital services based on physician/advanced practitioner order or complex needs related to functional status, cognitive ability, or social support system  Outcome: Progressing     Problem: Knowledge Deficit  Goal: Patient/family/caregiver demonstrates understanding of disease process, treatment plan, medications, and discharge instructions  Description: Complete learning assessment and assess knowledge base.  Interventions:  - Provide teaching at level of understanding  - Provide teaching via preferred learning methods  Outcome: Progressing

## 2025-05-30 NOTE — PROGRESS NOTES
"Progress Note - Hospitalist   Name: Margarito Nieves 79 y.o. female I MRN: [id number]  Unit/Bed#: -01 I Date of Admission: 5/29/2025   Date of Service: 5/30/2025 I Hospital Day: 1    Assessment & Plan  Puncture wound of finger of right hand with complication  Patient reports she developed sudden swelling in her right hand that started about 5 days ago.  She is unclear how it happened.  She speculated that she may have gotten \"bitten by something\" when she was feeding the birds and chipmunks.  She further added that she noticed a raised area at the base of her left thumb about 2 days ago and she decided to stick a needle in it to try to rest what ever was in there.  She said nothing came out of her hand.  She repeated the of the needle yesterday and again nothing returned.  She developed worsening swelling in her hand overnight and pain.  She went to urgent care who sent her here to the ED for further evaluation and treatment.    Blood cultures x 2 results pending  Right hand x-ray no osseous abnormality  Procalcitonin 0.07-->0.09  Received vancomycin in the ED  Continue Unasyn  Appreciate input of orthopedics-no surgical intervention, ice, elevate, gentle compression, continue antibiotics  Appreciate input of infectious disease-continue current antibiotics  CT right hand pending  Redness and swelling has greatly improved since yesterday, pain improved, will continue to monitor  CBC a.m.  Benign essential hypertension  Blood pressures reviewed, slight elevation likely related to pain will address  Continue prehospital diltiazem  Monitor BP per protocol  Paroxysmal A-fib (HCC)  Continue prehospital diltiazem for rate control  Continue prehospital Pradaxa for anticoagulation  GERD (gastroesophageal reflux disease)  Prehospital Prilosec substituted with pantoprazole while inpatient  Fever  Tmax 100.5 last 24 hours  See plan for puncture wound right hand  Continue to monitor  Tylenol as needed fever    VTE " Pharmacologic Prophylaxis: VTE Score: 4 Moderate Risk (Score 3-4) - Pharmacological DVT Prophylaxis Ordered: dabigatran (Pradaxa).    Mobility:   Basic Mobility Inpatient Raw Score: 23  JH-HLM Goal: 7: Walk 25 feet or more  JH-HLM Achieved: 7: Walk 25 feet or more  JH-HLM Goal achieved. Continue to encourage appropriate mobility.    Patient Centered Rounds: I performed bedside rounds with nursing staff today.   Discussions with Specialists or Other Care Team Provider: Orthopedics, ID, nursing, case management    Education and Discussions with Family / Patient: Updated  () via phone.    Current Length of Stay: 1 day(s)  Current Patient Status: Inpatient   Certification Statement: The patient will continue to require additional inpatient hospital stay due to continued treatment of right hand cellulitis with IV antibiotics, pending CT scan, ID and orthopedics following, repeat labs in  Discharge Plan: Patient will return home medically stable for discharge.  The swelling and redness in her right hand has improved since yesterday.  She will continue on IV antibiotics today.  ID and orthopedics following.  Repeat labs in the AM.    Code Status: Level 1 - Full Code    Subjective   Patient reports a little nauseous today otherwise no complaints.  Feels the redness and swelling in her hand is improving    Objective :  Temp:  [98.1 °F (36.7 °C)-100.5 °F (38.1 °C)] 98.2 °F (36.8 °C)  HR:  [53-89] 89  BP: (118-193)/(62-77) 144/76  Resp:  [16-20] 18  SpO2:  [95 %-99 %] 95 %  O2 Device: None (Room air)    There is no height or weight on file to calculate BMI.     Input and Output Summary (last 24 hours):   No intake or output data in the 24 hours ending 05/30/25 1248    Physical Exam  Vitals reviewed.   Constitutional:       General: She is not in acute distress.     Appearance: She is well-developed.   HENT:      Head: Normocephalic and atraumatic.     Cardiovascular:      Rate and Rhythm: Normal rate and  regular rhythm.      Pulses: Normal pulses.      Heart sounds: Normal heart sounds. No murmur heard.  Pulmonary:      Effort: Pulmonary effort is normal. No respiratory distress.      Breath sounds: Normal breath sounds. No wheezing or rales.   Abdominal:      General: There is no distension.      Palpations: Abdomen is soft.      Tenderness: There is no abdominal tenderness. There is no guarding.     Musculoskeletal:         General: No swelling.     Skin:     General: Skin is warm and dry.      Capillary Refill: Capillary refill takes less than 2 seconds.      Findings: Erythema present.      Comments: Right hand swelling and erythema improved from yesterday     Neurological:      General: No focal deficit present.      Mental Status: She is alert and oriented to person, place, and time. Mental status is at baseline.     Psychiatric:         Mood and Affect: Mood normal.         Behavior: Behavior normal.         Thought Content: Thought content normal.         Judgment: Judgment normal.           Lines/Drains:              Lab Results: I have reviewed the following results:   Results from last 7 days   Lab Units 05/30/25  0506   WBC Thousand/uL 6.12   HEMOGLOBIN g/dL 11.0*   HEMATOCRIT % 34.0*   PLATELETS Thousands/uL 280   SEGS PCT % 82*   LYMPHO PCT % 11*   MONO PCT % 6   EOS PCT % 0     Results from last 7 days   Lab Units 05/30/25  0506 05/29/25  1623   SODIUM mmol/L 134* 138   POTASSIUM mmol/L 3.9 4.7   CHLORIDE mmol/L 101 103   CO2 mmol/L 26 26   BUN mg/dL 13 18   CREATININE mg/dL 0.97 1.17   ANION GAP mmol/L 7 9   CALCIUM mg/dL 8.9 9.4   ALBUMIN g/dL  --  4.1   TOTAL BILIRUBIN mg/dL  --  0.45   ALK PHOS U/L  --  116*   ALT U/L  --  18   AST U/L  --  28   GLUCOSE RANDOM mg/dL 166* 82                 Results from last 7 days   Lab Units 05/30/25  0506 05/29/25  1623   PROCALCITONIN ng/ml 0.09 0.07       Recent Cultures (last 7 days):   Results from last 7 days   Lab Units 05/29/25  1831   BLOOD CULTURE   Received in Microbiology Lab. Culture in Progress.  Received in Microbiology Lab. Culture in Progress.       Imaging Results Review: I reviewed radiology reports from this admission including: Right hand x-ray.  Other Study Results Review: No additional pertinent studies reviewed.    Last 24 Hours Medication List:     Current Facility-Administered Medications:     acetaminophen (TYLENOL) tablet 650 mg, Q6H PRN    ampicillin-sulbactam (UNASYN) 3 g in sodium chloride 0.9 % 100 mL IVPB, Q6H, Last Rate: 3 g (05/30/25 0824)    ascorbic acid (VITAMIN C) tablet 1,000 mg, Daily    Cholecalciferol (VITAMIN D3) tablet 2,000 Units, Daily    cyanocobalamin (VITAMIN B-12) tablet 50 mcg, Daily    dabigatran etexilate (PRADAXA) capsule 150 mg, BID    diltiazem (CARDIZEM) tablet 120 mg, Daily    ferrous gluconate (FERGON) tablet 324 mg, Daily With Breakfast    fluticasone (FLONASE) 50 mcg/act nasal spray 1 spray, BID    HYDROcodone-acetaminophen (NORCO) 5-325 mg per tablet 1 tablet, Q6H PRN    HYDROcodone-acetaminophen (NORCO) 5-325 mg per tablet 2 tablet, Q6H PRN    HYDROmorphone (DILAUDID) injection 0.5 mg, Q4H PRN    multivitamin stress formula tablet 1 tablet, Daily    pantoprazole (PROTONIX) EC tablet 40 mg, Early Morning    Administrative Statements   Today, Patient Was Seen By: LUCRECIA Larsen  I have spent a total time of 37 minutes in caring for this patient on the day of the visit/encounter including Patient and family education, Counseling / Coordination of care, Documenting in the medical record, Reviewing/placing orders in the medical record (including tests, medications, and/or procedures), Obtaining or reviewing history  , and Communicating with other healthcare professionals .    **Please Note: This note may have been constructed using a voice recognition system.**

## 2025-05-30 NOTE — PLAN OF CARE
Problem: SAFETY ADULT  Goal: Patient will remain free of falls  Description: INTERVENTIONS:  - Educate patient/family on patient safety including physical limitations  - Instruct patient to call for assistance with activity   - Consider consulting OT/PT to assist with strengthening/mobility based on AM PAC & JH-HLM score  - Consult OT/PT to assist with strengthening/mobility   - Keep Call bell within reach  - Keep bed low and locked with side rails adjusted as appropriate  - Keep care items and personal belongings within reach  - Initiate and maintain comfort rounds  - Make Fall Risk Sign visible to staff  - Offer Toileting every 1 Hours, in advance of need  - Initiate/Maintain bed alarm  - Obtain necessary fall risk management equipment:   - Apply yellow socks and bracelet for high fall risk patients  - Consider moving patient to room near nurses station  Outcome: Progressing  Goal: Maintain or return to baseline ADL function  Description: INTERVENTIONS:  -  Assess patient's ability to carry out ADLs; assess patient's baseline for ADL function and identify physical deficits which impact ability to perform ADLs (bathing, care of mouth/teeth, toileting, grooming, dressing, etc.)  - Assess/evaluate cause of self-care deficits   - Assess range of motion  - Assess patient's mobility; develop plan if impaired  - Assess patient's need for assistive devices and provide as appropriate  - Encourage maximum independence but intervene and supervise when necessary  - Involve family in performance of ADLs  - Assess for home care needs following discharge   - Consider OT consult to assist with ADL evaluation and planning for discharge  - Provide patient education as appropriate  - Monitor functional capacity and physical performance, use of AM PAC & JH-HLM   - Monitor gait, balance and fatigue with ambulation    Outcome: Progressing  Goal: Maintains/Returns to pre admission functional level  Description: INTERVENTIONS:  -  Perform AM-PAC 6 Click Basic Mobility/ Daily Activity assessment daily.  - Set and communicate daily mobility goal to care team and patient/family/caregiver.   - Collaborate with rehabilitation services on mobility goals if consulted  - Perform Range of Motion 3 times a day.  - Reposition patient every 2 hours.  - Dangle patient 3 times a day  - Stand patient 3 times a day  - Ambulate patient 3 times a day  - Out of bed to chair 3 times a day   - Out of bed for meals 3 times a day  - Out of bed for toileting  - Record patient progress and toleration of activity level   Outcome: Progressing

## 2025-05-30 NOTE — ASSESSMENT & PLAN NOTE
Likely secondary to R hand infection above. No other clear source appreciated. No active respiratory, GI, or  symptoms. No upper respiratory or sinus symptoms to suggest viral illness. Blood cultures are pending.   -antibiotic as above  -check CBCD and CMP tomorrow  -monitor vitals

## 2025-05-30 NOTE — CASE MANAGEMENT
Case Management Assessment & Discharge Planning Note    Patient name Margarito Nieves  Location /-01 MRN [id number]  : 1945 Date 2025       Current Admission Date: 2025  Current Admission Diagnosis:Puncture wound of finger of right hand with complication   Patient Active Problem List    Diagnosis Date Noted    Fever 2025    Puncture wound of finger of right hand with complication 2025    Mckenna's esophagus without dysplasia 2024    Snoring 10/30/2024    JEANIE (obstructive sleep apnea) 10/29/2024    Nail deformity 2024    Syncope 2024    Paroxysmal A-fib (HCC) 2024    Bradycardia 2024    New onset a-fib (HCC) 2024    Anemia 2023    Hip arthritis 2023    Abdominal bloating 2023    Early satiety 2023    Hand dermatitis 2023    Unspecified multiple injuries, sequela 2023    History of non-ST elevation myocardial infarction (NSTEMI) 2023    History of fracture of femur 2023    History of fracture due to fall 2023    Left hip pain 2023    Side effect of medication 2023    GERD (gastroesophageal reflux disease) 05/10/2022    Age related osteoporosis 05/10/2022    Rheumatoid arthritis involving both hands with positive rheumatoid factor (HCC) 2019    S/P trigger finger release 2019    Lung density on x-ray 10/24/2017    Cataract 2016    Benign essential hypertension 2016    Palpitations 2016    Abnormal EKG 2016    Visual field scotoma of both eyes 2016    Allergic rhinitis 2015      LOS (days): 1  Geometric Mean LOS (GMLOS) (days):   Days to GMLOS:     OBJECTIVE:    Risk of Unplanned Readmission Score: 11.17     Current admission status: Inpatient    Preferred Pharmacy:   [id number] [address]  [address] [facility] [address]  Phone: [phone] Fax: [phone]    [facility] ([facility] #[id number] - [place], [place] - [address] [address]  Phone: [phone] Fax: [phone]    Primary Care Provider: María Stinson DO    Primary Insurance: BLUE CROSS  Secondary Insurance:     ASSESSMENT:  Active Health Care Proxies    There are no active Health Care Proxies on file.       Advance Directives  Does patient have a Health Care POA?: Yes  Does patient have Advance Directives?: Yes  Advance Directives: Living will  Primary Contact: Leroy Henry    Readmission Root Cause  30 Day Readmission: No    Patient Information  Admitted from:: Home  Mental Status: Alert  During Assessment patient was accompanied by: Not accompanied during assessment  Assessment information provided by:: Patient  Primary Caregiver: Self  Support Systems: Spouse/significant other  County of Residence: [place]  What [facility] do you live in?: [place]  Home entry access options. Select all that apply.: Stairs  Number of steps to enter home.: 1  Do the steps have railings?: No  Type of Current Residence: [address] [place] [address] home  Upon entering residence, is there a bedroom on the main floor (no further steps)?: Yes  Upon entering residence, is there a bathroom on the main floor (no further steps)?: Yes  Living Arrangements: Lives w/ Spouse/significant other  Is patient a ?: No    Activities of Daily Living Prior to Admission  Functional Status: Independent  Completes ADLs independently?: Yes  Ambulates independently?: Yes  Does patient use assisted devices?: No  Does patient currently own DME?: No  Does patient have a history of Outpatient Therapy (PT/OT)?: Yes  Does the patient have a history of Short-Term Rehab?: Yes  Does patient have a history of HHC?: Yes ([facility])  Does patient currently have HHC?: No    Patient Information Continued  Income Source: Pension/jail  Does patient have prescription coverage?: Yes  Can the patient afford their medications and any related  supplies (such as glucometers or test strips)?: Yes  Does patient receive dialysis treatments?: No  Does patient have a history of substance abuse?: No  Does patient have a history of Mental Health Diagnosis?: No         Means of Transportation  Means of Transport to Appts:: Family transport    DISCHARGE DETAILS:    Discharge planning discussed with:: Pt     Contacts  Patient Contacts: Leroy Henry  Relationship to Patient:: Family  Contact Method: Phone  Phone Number: [phone]  Reason/Outcome: Continuity of Care  Pt admitted to hospital for puncture of the finger.  Pt on IVABX.  Will follow for any care needs.

## 2025-05-30 NOTE — UTILIZATION REVIEW
Initial Clinical Review    Admission: Date/Time/Statement:   Admission Orders (From admission, onward)       Ordered        05/29/25 1704  INPATIENT ADMISSION  Once                          Orders Placed This Encounter   Procedures    INPATIENT ADMISSION     Standing Status:   Standing     Number of Occurrences:   1     Level of Care:   Med Surg [16]     Estimated length of stay:   More than 2 Midnights     Certification:   I certify that inpatient services are medically necessary for this patient for a duration of greater than two midnights. See H&P and MD Progress Notes for additional information about the patient's course of treatment.     ED Arrival Information       Expected   5/29/2025     Arrival   5/29/2025 15:39    Acuity   Urgent              Means of arrival   Walk-In    Escorted by   Self    Service   Hospitalist    Admission type   Emergency              Arrival complaint   hand swelling             Chief Complaint   Patient presents with    Hand Swelling     Pt to ER from urgent care for reports of R hand swelling and redness worsening over last 7 days s/p small cut when gardening. Pt reports chills.        Initial Presentation: 79 y.o. female presents to ED from Urgent  Care with  worsening pain and swelling right hand.  Symptoms started about 5 days  prior to admission.   Unclear etiology, feels she may have gotten bit  by something.  She states that she feeds the birds and chipmunks and was sticking her hands in and out of an open log and may have gotten bitten by something.   Noticed a raised area  about  2 days  ago at  base of thumb with a scabbed area on top.   She felt there may be some fluid in there so she stuck the area with a needle to try to express the fluid, however none returned. She repeated the process yesterday morning, again no fluid returned. Redness and  swelling worsened.  Denied chills or fever. Given  IAIN  in ED.  PMH is  Afib  on Pradaxa, essential hypertension, GERD.  Admit   Ip with Puncture wound finger of right hand with complication  and plan is  ID consult, monitor labs, blood cultures, IAIN, ortho consult  and pain  control.      Anticipated Length of Stay/Certification Statement: Patient will be admitted on an inpatient basis with an anticipated length of stay of greater than 2 midnights secondary to puncture wound of the right hand cellulitic appearance requiring IV antibiotics, orthopedic consult, pain regimen.       Date:    5/30      Day 2:   ID  consult  Continue  IV  unasyn.   Need serial exams  RUE.   F/U blood cultures.   Need  Ct  RUE.  Afebrile this am.    Feels improved since admission.   R  hand/wrist/forearm  with edema  and some erythema.  R  palmar hand and wrist region  tender  on palpation.      ED Treatment-Medication Administration from 05/29/2025 1531 to 05/29/2025 1754         Date/Time Order Dose Route Action     05/29/2025 1636 vancomycin 750 mg in sodium chloride 0.9% 250 mL 750 mg Intravenous New Bag            Scheduled Medications:  ampicillin-sulbactam, 3 g, Intravenous, Q6H  vitamin C, 1,000 mg, Oral, Daily  Cholecalciferol, 2,000 Units, Oral, Daily  vitamin B-12, 50 mcg, Oral, Daily  dabigatran etexilate, 150 mg, Oral, BID  diltiazem, 120 mg, Oral, Daily  ferrous gluconate, 324 mg, Oral, Daily With Breakfast  fluticasone, 1 spray, Each Nare, BID  multivitamin stress formula, 1 tablet, Oral, Daily  pantoprazole, 40 mg, Oral, Early Morning      Continuous IV Infusions:     PRN Meds:  acetaminophen, 650 mg, Oral, Q6H PRN  HYDROcodone-acetaminophen, 1 tablet, Oral, Q6H PRN  HYDROcodone-acetaminophen, 2 tablet, Oral, Q6H PRN  HYDROmorphone, 0.5 mg, Intravenous, Q4H PRN      ED Triage Vitals [05/29/25 1546]   Temperature Pulse Respirations Blood Pressure SpO2 Pain Score   99.4 °F (37.4 °C) 58 18 162/75 98 % 10 - Worst Possible Pain         Vital Signs (last 3 days)       Date/Time Temp Pulse Resp BP MAP (mmHg) SpO2 O2 Device Patient Position - Orthostatic VS  Juan Coma Scale Score Pain    05/30/25 0829 -- -- -- -- -- -- -- -- 15 --    05/30/25 07:31:55 98.2 °F (36.8 °C) 89 18 144/76 99 95 % -- -- -- 7    05/30/25 0701 98.2 °F (36.8 °C) 89 18 144/76 -- 95 % -- -- -- --    05/30/25 06:08:25 98.1 °F (36.7 °C) 75 18 140/73 95 96 % -- -- -- --    05/30/25 05:02:46 98.1 °F (36.7 °C) -- 17 118/70 86 -- -- -- -- --    05/29/25 23:06:05 98.3 °F (36.8 °C) 53 17 135/62 86 97 % -- -- -- --    05/29/25 2052 -- -- -- -- -- -- -- -- 15 10 - Worst Possible Pain    05/29/25 2010 -- -- -- -- -- -- -- -- -- Med Not Given for Pain - for MAR use only    05/29/25 19:57:06 100.5 °F (38.1 °C) 64 20 147/65 92 97 % -- -- -- --    05/29/25 1815 -- -- -- -- -- -- -- -- -- 10 - Worst Possible Pain    05/29/25 1700 99.2 °F (37.3 °C) 59 17 169/73 105 96 % None (Room air) -- -- --    05/29/25 1600 99.4 °F (37.4 °C) 63 18 193/77 111 99 % -- -- -- --    05/29/25 1556 -- -- -- -- -- -- -- -- 15 --    05/29/25 1546 99.4 °F (37.4 °C) 58 18 162/75 -- 98 % None (Room air) Sitting -- 10 - Worst Possible Pain              Pertinent Labs/Diagnostic Test Results:   Radiology:  XR hand 3+ views RIGHT   Final Interpretation by Jenniffer Ball MD (05/29 1731)      Limited study.   No acute osseous abnormality.         Computerized Assisted Algorithm (CAA) may have been used to analyze all applicable images.         Workstation performed: [id number]           Cardiology:    GI:          Results from last 7 days   Lab Units 05/30/25  0506 05/29/25  1623   WBC Thousand/uL 6.12 8.17   HEMOGLOBIN g/dL 11.0* 12.3   HEMATOCRIT % 34.0* 37.9   PLATELETS Thousands/uL 280 324   TOTAL NEUT ABS Thousands/µL 5.05 6.64         Results from last 7 days   Lab Units 05/30/25  0506 05/29/25  1623   SODIUM mmol/L 134* 138   POTASSIUM mmol/L 3.9 4.7   CHLORIDE mmol/L 101 103   CO2 mmol/L 26 26   ANION GAP mmol/L 7 9   BUN mg/dL 13 18   CREATININE mg/dL 0.97 1.17   EGFR ml/min/1.73sq m 55 44   CALCIUM mg/dL 8.9 9.4     Results from  last 7 days   Lab Units 05/29/25  1623   AST U/L 28   ALT U/L 18   ALK PHOS U/L 116*   TOTAL PROTEIN g/dL 7.3   ALBUMIN g/dL 4.1   TOTAL BILIRUBIN mg/dL 0.45         Results from last 7 days   Lab Units 05/30/25  0506 05/29/25  1623   GLUCOSE RANDOM mg/dL 166* 82               Results from last 7 days   Lab Units 05/30/25  0506 05/29/25  1623   PROCALCITONIN ng/ml 0.09 0.07           Results from last 7 days   Lab Units 05/29/25  1831   BLOOD CULTURE  Received in Microbiology Lab. Culture in Progress.  Received in Microbiology Lab. Culture in Progress.                   Past Medical History[1]  Present on Admission:   Benign essential hypertension   GERD (gastroesophageal reflux disease)   Paroxysmal A-fib (HCC)      Admitting Diagnosis: Hand swelling [M79.89]  Puncture wound of right hand with complication, initial encounter [S61.431A]  Swelling of right hand [M79.89]  Age/Sex: 79 y.o. female    Network Utilization Review Department  ATTENTION: Please call with any questions or concerns to [phone] and carefully listen to the prompts so that you are directed to the right person. All voicemails are confidential.   For Discharge needs, contact Care Management DC Support Team at [phone] opt. 2  Send all requests for admission clinical reviews, approved or denied determinations and any other requests to dedicated fax number below belonging to the campus where the patient is receiving treatment. List of dedicated fax numbers for the Facilities:  FACILITY NAME UR FAX NUMBER   ADMISSION DENIALS (Administrative/Medical Necessity) [phone]   DISCHARGE SUPPORT TEAM (NETWORK) [phone]   PARENT CHILD HEALTH (Maternity/NICU/Pediatrics) [phone]   [facility] [phone]   [facility] [phone]   [facility] [phone]   [facility] [phone]   [facility] [phone]    [facility] [phone]   [facility] [phone]   [facility] [phone]   [facility] [phone]   [facility] [phone]   [facility] [phone]   [facility] [phone]               [1]   Past Medical History:  Diagnosis Date    Cellulitis of right hand 05/10/2022    Closed fracture of right proximal humerus 02/15/2020    GERD (gastroesophageal reflux disease)     NSTEMI (non-ST elevated myocardial infarction) (HCC) 02/16/2020    due to demand ischemia post fall/fracture    Osteopenia 10/23/2017    PAF (paroxysmal atrial fibrillation)     Syncope

## 2025-05-31 VITALS
HEART RATE: 56 BPM | OXYGEN SATURATION: 96 % | DIASTOLIC BLOOD PRESSURE: 68 MMHG | TEMPERATURE: 98.1 F | RESPIRATION RATE: 16 BRPM | SYSTOLIC BLOOD PRESSURE: 138 MMHG

## 2025-05-31 LAB
ANION GAP SERPL CALCULATED.3IONS-SCNC: 5 MMOL/L (ref 4–13)
BASOPHILS # BLD AUTO: 0.02 THOUSANDS/ÂΜL (ref 0–0.1)
BASOPHILS NFR BLD AUTO: 0 % (ref 0–1)
BUN SERPL-MCNC: 14 MG/DL (ref 5–25)
CALCIUM SERPL-MCNC: 8.9 MG/DL (ref 8.4–10.2)
CHLORIDE SERPL-SCNC: 105 MMOL/L (ref 96–108)
CO2 SERPL-SCNC: 26 MMOL/L (ref 21–32)
CREAT SERPL-MCNC: 1.1 MG/DL (ref 0.6–1.3)
EOSINOPHIL # BLD AUTO: 0.1 THOUSAND/ÂΜL (ref 0–0.61)
EOSINOPHIL NFR BLD AUTO: 2 % (ref 0–6)
ERYTHROCYTE [DISTWIDTH] IN BLOOD BY AUTOMATED COUNT: 11.3 % (ref 11.6–15.1)
GFR SERPL CREATININE-BSD FRML MDRD: 47 ML/MIN/1.73SQ M
GLUCOSE SERPL-MCNC: 118 MG/DL (ref 65–140)
HCT VFR BLD AUTO: 33.3 % (ref 34.8–46.1)
HGB BLD-MCNC: 10.9 G/DL (ref 11.5–15.4)
IMM GRANULOCYTES # BLD AUTO: 0.06 THOUSAND/UL (ref 0–0.2)
IMM GRANULOCYTES NFR BLD AUTO: 1 % (ref 0–2)
LYMPHOCYTES # BLD AUTO: 1.49 THOUSANDS/ÂΜL (ref 0.6–4.47)
LYMPHOCYTES NFR BLD AUTO: 31 % (ref 14–44)
MCH RBC QN AUTO: 31.9 PG (ref 26.8–34.3)
MCHC RBC AUTO-ENTMCNC: 32.7 G/DL (ref 31.4–37.4)
MCV RBC AUTO: 97 FL (ref 82–98)
MONOCYTES # BLD AUTO: 0.57 THOUSAND/ÂΜL (ref 0.17–1.22)
MONOCYTES NFR BLD AUTO: 12 % (ref 4–12)
NEUTROPHILS # BLD AUTO: 2.63 THOUSANDS/ÂΜL (ref 1.85–7.62)
NEUTS SEG NFR BLD AUTO: 54 % (ref 43–75)
NRBC BLD AUTO-RTO: 0 /100 WBCS
PLATELET # BLD AUTO: 306 THOUSANDS/UL (ref 149–390)
PMV BLD AUTO: 10.6 FL (ref 8.9–12.7)
POTASSIUM SERPL-SCNC: 4.2 MMOL/L (ref 3.5–5.3)
RBC # BLD AUTO: 3.42 MILLION/UL (ref 3.81–5.12)
SODIUM SERPL-SCNC: 136 MMOL/L (ref 135–147)
WBC # BLD AUTO: 4.87 THOUSAND/UL (ref 4.31–10.16)

## 2025-05-31 PROCEDURE — 99239 HOSP IP/OBS DSCHRG MGMT >30: CPT

## 2025-05-31 PROCEDURE — 80048 BASIC METABOLIC PNL TOTAL CA: CPT

## 2025-05-31 PROCEDURE — 85025 COMPLETE CBC W/AUTO DIFF WBC: CPT

## 2025-05-31 PROCEDURE — 99232 SBSQ HOSP IP/OBS MODERATE 35: CPT | Performed by: STUDENT IN AN ORGANIZED HEALTH CARE EDUCATION/TRAINING PROGRAM

## 2025-05-31 RX ADMIN — SODIUM CHLORIDE 3 G: 9 INJECTION, SOLUTION INTRAVENOUS at 01:44

## 2025-05-31 RX ADMIN — SODIUM CHLORIDE 3 G: 9 INJECTION, SOLUTION INTRAVENOUS at 08:50

## 2025-05-31 NOTE — ASSESSMENT & PLAN NOTE
"Patient reported she developed sudden swelling in her right hand that started about 5 days prior to arrival to the ED ago.  She is unclear how it happened.  She speculated that she may have gotten \"bitten by something\" when she was feeding the birds and chipmunks.  She further added that she noticed a raised area at the base of her left thumb about 2 days ago and she decided to stick a needle in it to try to rest what ever was in there.  She said nothing came out of her hand.  She repeated the of the needle yesterday and again nothing returned.  She developed worsening swelling in her hand overnight and pain.  She went to urgent care who sent her here to the ED for further evaluation and treatment.    Blood cultures x 2 negative after 24 hours  Right hand x-ray no osseous abnormality  Procalcitonin 0.07-->0.09  Received vancomycin in the ED  Appreciate input of orthopedics-no surgical intervention, ice, elevate, gentle compression, continue antibiotics  Appreciate input of infectious disease  CT right hand suggested cellulitis, no evidence of abscess although could not completely rule out given no contrast.  Contrast was not used because patient has allergy to IV contrast  Continued improvement in erythema, swelling much improved, no pain.  Fever or leukocytosis overnight.  Anxious to go home.  Received Unasyn while inpatient, transitioning to 1 week course of Augmentin on discharge  Advised to ice and elevate hand throughout the day and overnight to help reduce the swelling  Follow-up PCP within a week  "

## 2025-05-31 NOTE — PROGRESS NOTES
"Patient refusing morning PO meds, only accepts IV antibiotic. Patient educated on importance of taking pradaxa and cardizem, patient continued to refuse and state, \"I will take them when I go home.\" CRNP made aware.   "

## 2025-05-31 NOTE — ASSESSMENT & PLAN NOTE
Continue prehospital diltiazem for rate control on discharge  Continue prehospital Pradaxa for anticoagulation discharge

## 2025-05-31 NOTE — DISCHARGE SUMMARY
"Discharge Summary - Hospitalist   Name: Margarito Nieves 79 y.o. female I MRN: [id number]  Unit/Bed#: -01 I Date of Admission: 5/29/2025   Date of Service: 5/31/2025 I Hospital Day: 2     Assessment & Plan  Puncture wound of finger of right hand with complication  Patient reported she developed sudden swelling in her right hand that started about 5 days prior to arrival to the ED ago.  She is unclear how it happened.  She speculated that she may have gotten \"bitten by something\" when she was feeding the birds and chipmunks.  She further added that she noticed a raised area at the base of her left thumb about 2 days ago and she decided to stick a needle in it to try to rest what ever was in there.  She said nothing came out of her hand.  She repeated the of the needle yesterday and again nothing returned.  She developed worsening swelling in her hand overnight and pain.  She went to urgent care who sent her here to the ED for further evaluation and treatment.    Blood cultures x 2 negative after 24 hours  Right hand x-ray no osseous abnormality  Procalcitonin 0.07-->0.09  Received vancomycin in the ED  Appreciate input of orthopedics-no surgical intervention, ice, elevate, gentle compression, continue antibiotics  Appreciate input of infectious disease  CT right hand suggested cellulitis, no evidence of abscess although could not completely rule out given no contrast.  Contrast was not used because patient has allergy to IV contrast  Continued improvement in erythema, swelling much improved, no pain.  Fever or leukocytosis overnight.  Anxious to go home.  Received Unasyn while inpatient, transitioning to 1 week course of Augmentin on discharge  Advised to ice and elevate hand throughout the day and overnight to help reduce the swelling  Follow-up PCP within a week  Benign essential hypertension  Blood pressure controlled  Continue prehospital diltiazem    Paroxysmal A-fib (HCC)  Continue prehospital diltiazem for " rate control on discharge  Continue prehospital Pradaxa for anticoagulation discharge  GERD (gastroesophageal reflux disease)  Continue prehospital PPI on discharge  Fever  Afebrile last 24 hours   see plan for puncture wound right hand  Resolved     Medical Problems       Resolved Problems  Date Reviewed: 5/31/2025   None       Discharging Physician / Practitioner: LUCRECIA Larsen  PCP: María Stinson DO  Admission Date:   Admission Orders (From admission, onward)       Ordered        05/29/25 1704  INPATIENT ADMISSION  Once                          Discharge Date: 05/31/25    Next Steps for Physician/AP Assuming Care:  Advised patient to follow-up with PCP within a week    Test Results Pending at Discharge (will require follow up):  None    Medication Changes for Discharge & Rationale:   Augmentin 875/125 twice a day for 1 week added on discharge  No changes to prehospital medications  See after visit summary for reconciled discharge medications provided to patient and/or family.     Consultations During Hospital Stay:  Orthopedics, infectious disease    Procedures Performed:   None    Significant Findings / Test Results:   5/29 blood cultures x 2 negative after 24 hours  5/29 procalcitonin 0.07  5/29 WBC 8.17  5/29 right hand x-ray: No acute osseous abnormality  5/30 CT right upper extremity: Diffuse edema throughout the hand and wrist finding could be cellulitis.  No gross evidence of abscess formation detection limited without IV contrast.  No foreign body.  5/30 procalcitonin 0.09  5/30 WBC 6.12    Incidental Findings:   None    Hospital Course:   For full details regarding patient's hospitalization please refer to the contents of the chart.  In brief, Margarito Nieves is a 79 y.o. female patient who originally presented to the hospital on 5/29/2025 due to right hand pain and swelling.  She had a history of A-fib on Pradaxa, essential hypertension, and GERD.  She presented to urgent care due to  worsening pain and swelling of her right hand that started approximately 5 days prior.  Urgent care sent her to the ED for further evaluation.  She reported that she often sees birds and chipmunks and was sticking her hand inside of an open log may have gotten bit by something.  She reported that 2 days prior to arrival she felt a raised area at the base of her thumb and stabbed it with a needle to try to express drainage, however none returned.  She tried this procedure again the following day with similar results.  Overnight prior to arrival to the ED she had worsening pain redness and swelling.  She denied any fever or chills prehospital.  She received a dose of vancomycin in the ED and was initiated on Unasyn on the [facility] floor.  Orthopedics was consulted at and recommended antibiotics, ice elevation and gentle compression.  No need for surgical intervention.  ID was also consulted and recommends continued IV antibiotics and a CT of the right hand.  CT did not suggest an abscess although could not be completely ruled out because IV contrast could not be used due to patient's allergy.  She remains afebrile overnight, blood cultures are negative for 24 hours, and only has mild erythema and swelling in the right hand that is much improved.  She is anxious to go home today.  Will discharge her on a 1 week course of Augmentin antibiotic and advised to follow with PCP within a week.  Further advised to ice and elevate hand frequently throughout the day and overnight while in bed to help reduce the remaining swelling.  Patient expressed understanding of same.  She declined a call to her  for an update, she states she will call him for a ride home.          Please see above list of diagnoses and related plan for additional information.     Discharge Day Visit / Exam:   Subjective: Denies any pain or discomfort today.  Reports swelling in her hand is much improved as is the erythema.  She is anxious to go home  today.  Vitals: Blood Pressure: 138/68 (05/31/25 0741)  Pulse: 56 (05/31/25 0741)  Temperature: 98.1 °F (36.7 °C) (05/31/25 0741)  Temp Source: Oral (05/31/25 0741)  Respirations: 16 (05/31/25 0741)  SpO2: 96 % (05/31/25 0741)  Physical Exam  Vitals reviewed.   Constitutional:       General: She is not in acute distress.     Appearance: She is well-developed.   HENT:      Head: Normocephalic and atraumatic.     Cardiovascular:      Rate and Rhythm: Normal rate and regular rhythm.      Heart sounds: No murmur heard.  Pulmonary:      Effort: Pulmonary effort is normal. No respiratory distress.      Breath sounds: Normal breath sounds. No wheezing or rales.   Abdominal:      General: There is no distension.      Palpations: Abdomen is soft.      Tenderness: There is no abdominal tenderness. There is no guarding.     Musculoskeletal:         General: No swelling.     Skin:     General: Skin is warm and dry.      Capillary Refill: Capillary refill takes less than 2 seconds.      Comments: Mild erythema right hand, minimal swelling     Neurological:      General: No focal deficit present.      Mental Status: She is alert and oriented to person, place, and time. Mental status is at baseline.     Psychiatric:         Mood and Affect: Mood normal.         Behavior: Behavior normal.         Thought Content: Thought content normal.         Judgment: Judgment normal.          Discussion with Family: Patient declined call to .     Discharge instructions/Information to patient and family:   See after visit summary for information provided to patient and family.      Provisions for Follow-Up Care:  See after visit summary for information related to follow-up care and any pertinent home health orders.      Mobility at time of Discharge:   Basic Mobility Inpatient Raw Score: 23  JH-HLM Goal: 7: Walk 25 feet or more  JH-HLM Achieved: 7: Walk 25 feet or more  HLM Goal achieved. Continue to encourage appropriate  mobility.     Disposition:   Home    Planned Readmission: No    Administrative Statements   Discharge Statement:  I have spent a total time of 43 minutes in caring for this patient on the day of the visit/encounter. >30 minutes of time was spent on: Patient and family education, Counseling / Coordination of care, Documenting in the medical record, Reviewing / ordering tests, medicine, procedures  , and Communicating with other healthcare professionals .    **Please Note: This note may have been constructed using a voice recognition system**

## 2025-05-31 NOTE — DISCHARGE INSTR - AVS FIRST PAGE
Please follow-up with your primary care physician within a week of discharge  The CAT scan imaging of your hand did not suggest an abscess.  You have had no fever for 24 hours and your blood cultures are negative.  We will discharge you home today on a 1 week course of Augmentin antibiotic.  A prescription will be sent electronically to your pharmacy and will be available for pickup.  Please take the entire course of antibiotics as prescribed  I am making no other changes to your prehospital medications  You can take over-the-counter Tylenol for pain or fever.  You should ice and elevate your hand above the level of your heart a few times during the day and at night to help improve the swelling  You were evaluated by orthopedic surgery while inpatient and no surgical intervention needed  Please schedule follow-up appointment with Dr. Lott of hand surgery                It has been a pleasure taking care of you.  I wish you all the best on discharge!  LUCRECIA Larsen

## 2025-05-31 NOTE — PLAN OF CARE
Problem: PAIN - ADULT  Goal: Verbalizes/displays adequate comfort level or baseline comfort level  Description: Interventions:  - Encourage patient to monitor pain and request assistance  - Assess pain using appropriate pain scale  - Administer analgesics as ordered based on type and severity of pain and evaluate response  - Implement non-pharmacological measures as appropriate and evaluate response  - Consider cultural and social influences on pain and pain management  - Notify physician/advanced practitioner if interventions unsuccessful or patient reports new pain  - Educate patient/family on pain management process including their role and importance of  reporting pain   - Provide non-pharmacologic/complimentary pain relief interventions  5/31/2025 1218 by Odilia Lopez RN  Outcome: Adequate for Discharge  5/31/2025 0858 by Odilia Lopez RN  Outcome: Progressing     Problem: INFECTION - ADULT  Goal: Absence or prevention of progression during hospitalization  Description: INTERVENTIONS:  - Assess and monitor for signs and symptoms of infection  - Monitor lab/diagnostic results  - Monitor all insertion sites, i.e. indwelling lines, tubes, and drains  - Monitor endotracheal if appropriate and nasal secretions for changes in amount and color  - [place] appropriate cooling/warming therapies per order  - Administer medications as ordered  - Instruct and encourage patient and family to use good hand hygiene technique  - Identify and instruct in appropriate isolation precautions for identified infection/condition  5/31/2025 1218 by Odilia Lopez RN  Outcome: Adequate for Discharge  5/31/2025 0858 by Odilia Lopez RN  Outcome: Progressing  Goal: Absence of fever/infection during neutropenic period  Description: INTERVENTIONS:  - Monitor WBC  - Perform strict hand hygiene  - Limit to healthy visitors only  - No plants, dried, fresh or silk flowers with sharpe in patient room  5/31/2025 1218 by Odilia Lopez  RN  Outcome: Adequate for Discharge  5/31/2025 0858 by Odilia Lopez RN  Outcome: Progressing     Problem: SAFETY ADULT  Goal: Patient will remain free of falls  Description: INTERVENTIONS:  - Educate patient/family on patient safety including physical limitations  - Instruct patient to call for assistance with activity   - Consider consulting OT/PT to assist with strengthening/mobility based on AM PAC & JH-HLM score  - Consult OT/PT to assist with strengthening/mobility   - Keep Call bell within reach  - Keep bed low and locked with side rails adjusted as appropriate  - Keep care items and personal belongings within reach  - Initiate and maintain comfort rounds  - Make Fall Risk Sign visible to staff  - Offer Toileting every  Hours, in advance of need  - Initiate/Maintain alarm  - Obtain necessary fall risk management equipment:   - Apply yellow socks and bracelet for high fall risk patients  - Consider moving patient to room near nurses station  5/31/2025 1218 by Odilia Lopez RN  Outcome: Adequate for Discharge  5/31/2025 0858 by Odilia Lopez RN  Outcome: Progressing  Goal: Maintain or return to baseline ADL function  Description: INTERVENTIONS:  -  Assess patient's ability to carry out ADLs; assess patient's baseline for ADL function and identify physical deficits which impact ability to perform ADLs (bathing, care of mouth/teeth, toileting, grooming, dressing, etc.)  - Assess/evaluate cause of self-care deficits   - Assess range of motion  - Assess patient's mobility; develop plan if impaired  - Assess patient's need for assistive devices and provide as appropriate  - Encourage maximum independence but intervene and supervise when necessary  - Involve family in performance of ADLs  - Assess for home care needs following discharge   - Consider OT consult to assist with ADL evaluation and planning for discharge  - Provide patient education as appropriate  - Monitor functional capacity and physical  performance, use of AM PAC & -HLM   - Monitor gait, balance and fatigue with ambulation    5/31/2025 1218 by Odilia Lopez RN  Outcome: Adequate for Discharge  5/31/2025 0858 by Odilia Lopez RN  Outcome: Progressing  Goal: Maintains/Returns to pre admission functional level  Description: INTERVENTIONS:  - Perform AM-PAC 6 Click Basic Mobility/ Daily Activity assessment daily.  - Set and communicate daily mobility goal to care team and patient/family/caregiver.   - Collaborate with rehabilitation services on mobility goals if consulted  - Perform Range of Motion  times a day.  - Reposition patient every  hours.  - Dangle patient  times a day  - Stand patient  times a day  - Ambulate patient  times a day  - Out of bed to chair  times a day   - Out of bed for meals  times a day  - Out of bed for toileting  - Record patient progress and toleration of activity level   5/31/2025 1218 by Odilia Lopez RN  Outcome: Adequate for Discharge  5/31/2025 0858 by Odilia Lopez RN  Outcome: Progressing     Problem: DISCHARGE PLANNING  Goal: Discharge to home or other facility with appropriate resources  Description: INTERVENTIONS:  - Identify barriers to discharge w/patient and caregiver  - Arrange for needed discharge resources and transportation as appropriate  - Identify discharge learning needs (meds, wound care, etc.)  - Arrange for interpretive services to assist at discharge as needed  - Refer to Case Management Department for coordinating discharge planning if the patient needs post-hospital services based on physician/advanced practitioner order or complex needs related to functional status, cognitive ability, or social support system  5/31/2025 1218 by Odilia Lopez RN  Outcome: Adequate for Discharge  5/31/2025 0858 by Odilia Lopez RN  Outcome: Progressing     Problem: Knowledge Deficit  Goal: Patient/family/caregiver demonstrates understanding of disease process, treatment plan, medications, and discharge  instructions  Description: Complete learning assessment and assess knowledge base.  Interventions:  - Provide teaching at level of understanding  - Provide teaching via preferred learning methods  5/31/2025 1218 by Odilia Lopez RN  Outcome: Adequate for Discharge  5/31/2025 0858 by Odilia Lpoez RN  Outcome: Progressing     Problem: Prexisting or High Potential for Compromised Skin Integrity  Goal: Skin integrity is maintained or improved  Description: INTERVENTIONS:  - Identify patients at risk for skin breakdown  - Assess and monitor skin integrity including under and around medical devices   - Assess and monitor nutrition and hydration status  - Monitor labs  - Assess for incontinence   - Turn and reposition patient  - Assist with mobility/ambulation  - Relieve pressure over dedrick prominences   - Avoid friction and shearing  - Provide appropriate hygiene as needed including keeping skin clean and dry  - Evaluate need for skin moisturizer/barrier cream  - Collaborate with interdisciplinary team  - Patient/family teaching  - Consider wound care consult    Assess:  - Review Anton scale daily  - Clean and moisturize skin every   - Inspect skin when repositioning, toileting, and assisting with ADLS  - Assess under medical devices such as  every   - Assess extremities for adequate circulation and sensation     Bed Management:  - Have minimal linens on bed & keep smooth, unwrinkled  - Change linens as needed when moist or perspiring  - Avoid sitting or lying in one position for more than  hours while in bed?Keep HOB at degrees   - Toileting:  - Offer bedside commode  - Assess for incontinence every  - Use incontinent care products after each incontinent episode such as     Activity:  - Mobilize patient  times a day  - Encourage activity and walks on unit  - Encourage or provide ROM exercises   - Turn and reposition patient every  Hours  - Use appropriate equipment to lift or move patient in bed  - Instruct/ Assist  with weight shifting every  when out of bed in chair  - Consider limitation of chair time  hour intervals    Skin Care:  - Avoid use of baby powder, tape, friction and shearing, hot water or constrictive clothing  - Relieve pressure over bony prominences using   - Do not massage red bony areas    Next Steps:  - Teach patient strategies to minimize risks such as   - Consider consults to  interdisciplinary teams such as   5/31/2025 1218 by Odilia Lopez RN  Outcome: Adequate for Discharge  5/31/2025 0858 by Odilia Lopez RN  Outcome: Progressing

## 2025-05-31 NOTE — PROGRESS NOTES
Progress Note - Orthopedics   Name: Margarito Nieves 79 y.o. female I MRN: [id number]  Unit/Bed#: -01 I Date of Admission: 5/29/2025   Date of Service: 5/31/2025 I Hospital Day: 2    Assessment & Plan  Puncture wound of finger of right hand with complication  79 year old female with right hand pain, redness, and swelling for about 7 days  Continue antibiotics per primary team  Nonweightbearing to right upper extremity  Elevate affected extremity above heart level  Gentle compression with ACE bandage  Analgesics for pain per primary team  CT right hand shows diffuse subcutaneous edema of the hand and wrist with no evidence of fracture, abscess, or osteomyelitis. Degenerative changes noted  No acute hand surgery intervention indicated at this time  Blood cultures negative at this point  Follow up outpatient with hand surgery  Dispo: Ok for discharge from hand surgery perspective      This consult was completed with Dr Landry, the hand surgery attending on call.  Benign essential hypertension    GERD (gastroesophageal reflux disease)    Paroxysmal A-fib (HCC)    Fever      Medical co-morbidities include as stated above, which are being managed per primary team. Orthopedics service signing off.    Subjective   79 y.o.female resting comfortably in bed. No acute events, no new complaints. Patient complains of some pain but refused pain medications this AM per nursing note. Denies fevers, chills. Patient complains of some numbness and tingling over all 5 digits. Patient states the redness has improved. Patient states the swelling has stayed about the same. Patient states she is eager to be discharged.     Objective :  Temp:  [98.1 °F (36.7 °C)-98.6 °F (37 °C)] 98.1 °F (36.7 °C)  HR:  [51-58] 56  BP: (117-138)/(56-68) 138/68  Resp:  [16-18] 16  SpO2:  [96 %] 96 %  O2 Device: None (Room air)    Physical Exam  Musculoskeletal: Right hand  Skin: No obvious erythema.  Generalized swelling most significant throughout the  thumb and small finger. No fusiform swelling. Swelling slightly improved from yesterday's exam   Dry, scabbed area with 2 closed puncture wounds noted over the thenar eminence  Limited ability to make a full composite fist actively but improved motion compared to yesterday.  Able to achieve full composite fist with passive assist without pain  No pain with passive extension of all 5 digits  No tenderness to palpation throughout all extensor and flexor surfaces  Fingers not held in flexion  No Pain with Passive Extension of all 5 digits  Sensation intact Radial, Ulna, and Median  5/5 motor to Radial, Ulna, and Median  2+ Radial pulse  Brisk cap refill  Compartments are soft and nontender      Lab Results: I have reviewed the following results:  Recent Labs     05/29/25  1623 05/30/25  0506 05/31/25  0432   WBC 8.17 6.12 4.87   HGB 12.3 11.0* 10.9*   HCT 37.9 34.0* 33.3*    280 306   BUN 18 13 14   CREATININE 1.17 0.97 1.10     Blood Culture:    Lab Results   Component Value Date    BLOODCX No Growth at 24 hrs. 05/29/2025    BLOODCX No Growth at 24 hrs. 05/29/2025     Wound Culture:   Lab Results   Component Value Date    WOUNDCULT 2 colonies Pasteurella multocida (A) 06/26/2023       Imaging Results Review: I reviewed radiology reports from this admission including: CT hand. I agree with the following radiology report:  FINDINGS:     OSSEOUS STRUCTURES:  No fracture, dislocation or destructive osseous lesion. Severe second, third, fourth, fifth metacarpophalangeal joint osteoarthritis. No bony erosions or periosteal reaction to suggest osteomyelitis.     VISUALIZED MUSCULATURE:   No evidence of intramuscular collection or foreign body.     SOFT TISSUES: Diffuse subcutaneous edema throughout the hand and wrist, nonspecific finding but could be cellulitis. No gross evidence of abscess formation though detection is limited without IV contrast. No evidence of foreign body.     OTHER PERTINENT FINDINGS: None.      IMPRESSION:     Diffuse subcutaneous edema throughout the hand and wrist, nonspecific finding but could be cellulitis. No gross evidence of abscess formation though detection is limited without IV contrast. No evidence of foreign body.    Other Study Results Review: No additional pertinent studies reviewed.

## 2025-05-31 NOTE — NURSING NOTE
Patient discharged to home. IV removed with catheter tip intact, DSD and pressure applied. All belongings returned to patient upon discharge. Patient and SO verbalized understanding of discharge instructions/follow-up/medications.

## 2025-05-31 NOTE — PLAN OF CARE
Problem: PAIN - ADULT  Goal: Verbalizes/displays adequate comfort level or baseline comfort level  Description: Interventions:  - Encourage patient to monitor pain and request assistance  - Assess pain using appropriate pain scale  - Administer analgesics as ordered based on type and severity of pain and evaluate response  - Implement non-pharmacological measures as appropriate and evaluate response  - Consider cultural and social influences on pain and pain management  - Notify physician/advanced practitioner if interventions unsuccessful or patient reports new pain  - Educate patient/family on pain management process including their role and importance of  reporting pain   - Provide non-pharmacologic/complimentary pain relief interventions  Outcome: Progressing     Problem: INFECTION - ADULT  Goal: Absence or prevention of progression during hospitalization  Description: INTERVENTIONS:  - Assess and monitor for signs and symptoms of infection  - Monitor lab/diagnostic results  - Monitor all insertion sites, i.e. indwelling lines, tubes, and drains  - Monitor endotracheal if appropriate and nasal secretions for changes in amount and color  - [place] appropriate cooling/warming therapies per order  - Administer medications as ordered  - Instruct and encourage patient and family to use good hand hygiene technique  - Identify and instruct in appropriate isolation precautions for identified infection/condition  Outcome: Progressing  Goal: Absence of fever/infection during neutropenic period  Description: INTERVENTIONS:  - Monitor WBC  - Perform strict hand hygiene  - Limit to healthy visitors only  - No plants, dried, fresh or silk flowers with sharpe in patient room  Outcome: Progressing

## 2025-06-02 ENCOUNTER — TELEPHONE (OUTPATIENT)
Dept: OBGYN CLINIC | Facility: CLINIC | Age: 80
End: 2025-06-02

## 2025-06-02 ENCOUNTER — TRANSITIONAL CARE MANAGEMENT (OUTPATIENT)
Dept: FAMILY MEDICINE CLINIC | Facility: CLINIC | Age: 80
End: 2025-06-02

## 2025-06-02 ENCOUNTER — OFFICE VISIT (OUTPATIENT)
Dept: SLEEP CENTER | Facility: CLINIC | Age: 80
End: 2025-06-02
Payer: COMMERCIAL

## 2025-06-02 VITALS
WEIGHT: 123 LBS | OXYGEN SATURATION: 97 % | BODY MASS INDEX: 21 KG/M2 | DIASTOLIC BLOOD PRESSURE: 62 MMHG | HEART RATE: 67 BPM | HEIGHT: 64 IN | SYSTOLIC BLOOD PRESSURE: 139 MMHG

## 2025-06-02 DIAGNOSIS — I48.0 PAROXYSMAL A-FIB (HCC): ICD-10-CM

## 2025-06-02 DIAGNOSIS — G47.33 OSA (OBSTRUCTIVE SLEEP APNEA): Primary | ICD-10-CM

## 2025-06-02 PROCEDURE — 99203 OFFICE O/P NEW LOW 30 MIN: CPT

## 2025-06-02 NOTE — ASSESSMENT & PLAN NOTE
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  - Discussed with the patient the possible diagnosis, causes and conditions associated with SDB along with potential treatments.  She would ultimately like to think about her options before proceeding with treatment.  There was not a positional component to her sleep apnea and she has a number of missing molars which would likely preclude her from positional therapy or OAT.  She has concerns about her ability to tolerate a CPAP at this time.  We discussed how PAP therapy works and the compliance criteria were reviewed.  - Encouraged healthy lifestyle with adequate sleep (7-9 hours per night), healthy balanced diet and routine exercise.  Explained the importance of avoiding driving while drowsy.  - Follow-up if she wishes to proceed with treatment.

## 2025-06-02 NOTE — PROGRESS NOTES
Name: Margarito Nieves      : 1945      MRN: [id number]  Encounter Provider: Lacey Piña MD  Encounter Date: 2025   Encounter department: [facility] [place]  :  Assessment & Plan  JEANIE (obstructive sleep apnea)  - The patient has a history of mild JEANIE diagnosed on HSAT in  (NEELAM 8.4, supine NEELAM NA, O2 sumeet 83%, TRT <90% 21.6 min) and is presenting to discuss treatment options, she denies any daytime sleepiness at this time.  - Discussed with the patient the possible diagnosis, causes and conditions associated with SDB along with potential treatments.  She would ultimately like to think about her options before proceeding with treatment.  There was not a positional component to her sleep apnea and she has a number of missing molars which would likely preclude her from positional therapy or OAT.  She has concerns about her ability to tolerate a CPAP at this time.  We discussed how PAP therapy works and the compliance criteria were reviewed.  - Encouraged healthy lifestyle with adequate sleep (7-9 hours per night), healthy balanced diet and routine exercise.  Explained the importance of avoiding driving while drowsy.  - Follow-up if she wishes to proceed with treatment.       Paroxysmal A-fib (HCC)  - Did not appear to be in afib on exam today.  Reports decreased palpitations since initiation of diltiazem and is currently anticoagulated with pradaxa.  Continue follow up with cardiology.         History of Present Illness   HPI           HPI: Margarito Nieves is a 79 y.o. female with PMHx of HTN, HLD, RA, PAF and GERD who presents for sleep evaluation.  Sleep-Related History: Prior HSAT in 10/2024 which showed mild JEANIE (NEELAM 8.4, supine NEELAM NA, O2 sumeet 83%, TRT <90% 21.6 min), not currently on treatment.    The patient was referred by her cardiologist due to history of PAF and mild JEANIE on HSAT.  She reports she has been told about snoring in the past and often wakes up frequently to  urinate in the middle the night.  Otherwise she denies witnessed apneas, gasping/choking in sleep or morning headaches.  Additionally she notes no RLS symptoms, SP, HH, cataplexy or parasomnias.  She states overall she is generally happy with the quality of her sleep and does feel refreshed in the morning.  She has been diagnosed with paroxysmal atrial fibrillation and is now following with cardiology on diltiazem and Pradaxa.  She does note having multiple missing molars which may preclude her from OAT.  She has concerns about her ability to tolerate the CPAP.    ESS: Total score: 1/24  Greater or equal to 10 is positive for excessive daytime sleepiness  Pertinent Meds: None    Sleep-Wake Schedule:  She is self-described as a morning person.  Bedtime: 0000  Wake Time: 0700  Difficulty Falling Asleep: No  Avg Number of Awakenings: 3-4x  Cause of Awakenings: animal care and bathroom  Weekend Sleep Schedule: unchanged  Naps: denies    Avg TST per 24 hours: 6-7 hours    Sleep-Related Details:  Preferred Sleep Position: supine and side(s)  SDB Symptoms: snoring, multiple awakenings, and dry mouth/nose  Bruxism: No  Nocturnal GERD: No  Nocturnal Palpitations: No  Sleep-Related Hallucinations: No   Sleep Paralysis: No   Cataplexy: No     She denies having an urge to move the legs in the evening (when resting) nor uncomfortable and/or unpleasant sensations in the legs. She has not been told that she kicks her legs during sleep.     She denies any parasomnia activity.    Wake-Related Details  Daytime Sleepiness: No   Work Schedule: retired  Drowsy Driving: No, doesn't usually drive  Caffeine: Yes, 2 cups of coffee in the morning  Alcohol Use: Yes, 3 glasses of wine daily  Substance Use: No  Tobacco Use: Yes, smoked for ~20 years, quit at 37yo, max was 1/2 PPD, denies vaping/e-cigs, cigars and chewing tobacco  Weight Change: nothing she's aware of    She does not have difficulty with memory or concentration.    Family Sleep  "Hx: denies    Past Treatments:  None    Prior Sleep Studies:  HSAT -- 10/24/2024  NEELAM - 8.4  Sup NEELAM - NA  O2 [place] - 83.0%  TRT < 90% - 21.6 min    Other Relevant Labs and Studies:  None    Review of Systems  Pertinent positives/negatives included in HPI and also as noted:     Medical History Reviewed by provider this encounter:  Tobacco  Allergies  Meds  Problems  Med Hx  Surg Hx  Fam Hx     .  Objective   /62 (BP Location: Left arm, Patient Position: Sitting, Cuff Size: Standard)   Pulse 67   Ht 5' 4\" (1.626 m)   Wt 55.8 kg (123 lb)   SpO2 97%   BMI 21.11 kg/m²     Neck Circumference: 12  Physical Exam  Visit Vitals  /62 (BP Location: Left arm, Patient Position: Sitting, Cuff Size: Standard)   Pulse 67   Ht 5' 4\" (1.626 m)   Wt 55.8 kg (123 lb)   SpO2 97%   BMI 21.11 kg/m²   OB Status Postmenopausal   Smoking Status Former   BSA 1.59 m²     Constitutional: NAD, well appearing   Mental Status: AAOx3  Neck Circumference: Neck Circumference: 12 inches  Skin: Warm, dry, no rashes noted   Eyes: PERRL, normal conjunctiva  ENT: Nasal congestion absent, nasal valve incompetence absent.  Posterior Airspace:   Neumann Tongue Position: 3  Retrognathia: absent  Overbite: absent  High Arched Palate: absent  Tongue Scalloping/Ridging: absent  Tonsils: 1+  Uvula: normal  Chest: RRR, +S1/S2, CTA B/L, no W/R/R, no M/R/G   Extremities: No digital clubbing or pedal edema    Data  Lab Results   Component Value Date    HGB 10.9 (L) 05/31/2025    HCT 33.3 (L) 05/31/2025    MCV 97 05/31/2025      Lab Results   Component Value Date    CALCIUM 8.9 05/31/2025    K 4.2 05/31/2025    CO2 26 05/31/2025     05/31/2025    BUN 14 05/31/2025    CREATININE 1.10 05/31/2025     Lab Results   Component Value Date    IRON 120 07/12/2024    TIBC 252 07/12/2024    FERRITIN 156 07/12/2024     Lab Results   Component Value Date    AST 28 05/29/2025    ALT 18 05/29/2025           Lacey Piña MD  "

## 2025-06-02 NOTE — ASSESSMENT & PLAN NOTE
- Did not appear to be in afib on exam today.  Reports decreased palpitations since initiation of diltiazem and is currently anticoagulated with pradaxa.  Continue follow up with cardiology.

## 2025-06-02 NOTE — TELEPHONE ENCOUNTER
----- Message from Pillo Landry MD sent at 6/2/2025 10:11 AM EDT -----  Please call and offer patient follow-up this week with hand for hand cellulitis. If she would prefer, could follow-up with PCP. Thank you.

## 2025-06-02 NOTE — PATIENT INSTRUCTIONS
- We discussed CPAP therapy for the treatment of sleep apnea today in the office, you can send a [employer] message or call the office if you want to proceed.  Below is information about CPAP to help with your decision:    PAP Therapy Initiation    If you were to start on CPAP therapy you would ultimately receive your machine and regular supplies from a [employer] (durable medical equipment) company.  After your appointment with the DME for the initial set up you will need to schedule a follow-up appointment in the sleep office, this appointment should be 31-90 days after you receive the device.  You should be eligible for new supplies approximately every 3-6 months, depending on your insurance coverage.  If your mask doesn't fit well, please call the medical equipment company to schedule a formal mask fitting.  Insurance requires regular usage and periodic office follow ups for PAP therapy, to continue to cover supplies.  Insurance requires a follow-up in the office within 90 days of starting your new PAP device.  To schedule your follow up visit in the sleep office you can call: central scheduling ([phone]) or sleep office ([phone]).  To schedule the appointment with the DME you will need to contact them directly.      CMS/Insurance Requirements    Your insurance requires a face-to-face follow up visit within a 31-90 day period after starting CPAP.  Your insurance requires compliance with CPAP, which is at least 4 hours per night for 70% of the time. This must be done over a 30 day period and must occur within the initial 31-90 day period after starting CPAP.  Your insurance also requires at least yearly follow ups to continue to pay for CPAP supplies.    PAP Supply Guidelines    Below are the guidelines for reordering your supplies. You will be responsible for your deductible, co payments, and out of pocket expenses.    Item Frequency   Nasal Mask (no headgear) 1 every 3 months   Nasal Mask Cushion 1 every 2  weeks   Full Face Mask (no headgear) 1 every 3 months   Full Face Mask Cushion 1 every month   Nasal Pillows 1 every 2 weeks   Headgear 1 every 6 months   hin Strap 1 every 6 months   kimberly 1 every 3 months   Filters: Reusable 1 every 6 months   Filters: Disposable 1 every 2 weeks   Humidifier Chamber(disposable) 1 every 6 months       About Your PAP Therapy    Continuous Positive Airway Pressure/Bilevel Therapy  You have been prescribed Positive Airway Pressure (PAP) therapy to treat sleep apnea. The most common type of sleep apnea is obstructive sleep apnea (JEANIE), a condition in which the upper airway collapses during sleep. This obstruction keeps air from getting into your lungs. The prescribed PAP machine (CPAP or Bilevel or ASV) will smoothly blow air into your airway to prevent it from closing. The machine will use a mask to deliver the air through your nose and/or mouth. These devices are available in various sizes and styles.    It will take some time for you to get used to the new equipment and it may take a while for you to begin to feel the benefits of PAP therapy. Please be patient. If you are having problems adjusting to your machine, please contact us for help.    Beginning your PAP Therapy  Assembly:  Place your PAP machine on a level surface near your bed.  To prevent injury, do not place the machine higher than your head.  Keep the machine at least 12 inches away from anything that may block the vents.  Plug the machine into a properly grounded electrical outlet. It is better to avoid using an extension cord. If necessary, use a heavy-duty one.  If you are NOT using a humidifier with you PAP equipment:  Attach one end of your 6-foot tubing to the PAP unit outlet and the other end to your mask. (If your mask does not have a built-in exhalation port, a special exhalation valve should be used between the mask and the 6-foot tubing.)  Attach the headgear to your mask.  If you are using a humidifier with  "your PAP equipment:  Fill the humidifier with DISTILLED water to the maximum fill line.  Attach the humidifier to the PAP unit's outlet as instructed by the respiratory therapist with your home care company. Attach one end of your 6-foot tubing to the humidifier outlet and the other end to your mask. (If your mask does not have a built-in exhalation port, a special exhalation valve should be used between the mask and the 6-foot tubing.)  If you have been prescribed oxygen to be used with the PAP machine, you will be instructed on how to attach your oxygen tubing. Always turn off the oxygen tank before turning off your PAP machine.    Getting Started:  Wash your face and apply the mask and headgear as instructed by the sleep technologist or respiratory therapist. The mask should fit snugly to prevent air leaks, but not so tight as to cause skin irritation or discomfort.  Turn the PAP power switch to the ON position. You should feel air blowing through the mask. Breathe normally and adjust the mask gently if you feel an air leak.  If there are no leaks, activate the \"ramp\" feature on your PAP machine. The ramp allows a lower pressure to be delivered for a designated period of time (usually 5 to 45 minutes) to allow you to relax and  fall asleep more easily. The pressure will then gradually increase to the prescribed pressure that controls your apnea.  Remember to turn OFF your PAP unit when it is not in use.    Care and Maintenance    Headgear should be washed as needed. Daily inspection and weekly washings are recommended. Do not disassemble the straps. Machine wash in warm water, making sure to attach Velcro hooks and tabs before washing. Line dry or machine dry on a low setting.  Masks should be washed every day. Daily inspection is recommended. Leave the mask and tubing attached. Gently wash the mask with a soft cloth using warm water and mild detergent, concentrating on the mask cushion flaps. DO NOT use alcohol " or bleach. Rinse thoroughly and air dry.  Tubing and headgear should be washed weekly. Daily inspection is recommended. Wash in warm water and mild detergent and rinse thoroughly. Hook the tubing to the machine and blow until dry.  Humidifier should be washed daily and filled with DISTILLED water before use. Wash with warm water and mild detergent. Disinfect weekly by soaking with a solution of 1 part white vinegar and 3 parts water for 30 minutes. Rinse thoroughly and air dry.  Disposable filters should be replaced once a month. Wash reusable foam filters with warm water and mild detergent at least once a month. Rinse thoroughly and dry with paper towels.  Avoid  that contain fragrance or conditioners, as these will leave a residue.  NEVER iron any soft goods.    Troubleshooting    If the machine fails to turn on:  Make sure that the PAP machine is plugged into a grounded outlet.  Make sure that the ON/OFF switch is in the ON position.  Make sure that the wall outlet has power.    If there is no pressure coming from your machine:  Make sure that the inlet filter is clean and unblocked.  Make sure that the cooling fan is unblocked and that air is flowing freely.  Make sure that all tubing is securely connected.    If your PAP machine still fails to operate, please call your DME for assistance.    What You Should Know About Insurance    There are many different insurance policies and coverage for PAP equipment will vary. Find out what your coverage provides and what your responsibilities are as a consumer. PAP therapy equipment is considered Durable Medical Equipment (DME). Here are a few questions to ask your insurance provider:  What benefits do I have for DME? Do I have a deductible and/or co pay for DME?  Is there a repair or replacement plan for durable medical equipment?  How often can I receive a replacement mask, tubing, headgear and filters?  Do I have to demonstrate that I am using my PAP  device?  o How do I do that?    Important Information    It is very important to keep your PAP equipment and supplies clean. The life of the supplies depends on the care they receive. Under normal circumstances, expect the mask and headgear to last 9-12 months. Refer to the owner's manual for more information.    Important Safety Reminders    Keep equipment free from obstruction.  Use your PAP equipment as directed.  DO NOT try to adjust your pressure setting.  DO NOT block the exhalation port or valve.  Keep filters clean to prevent overheating.  If a humidifier is being used, place it at a level lower than your head.  If using a heated humidifier, allow unit to cool before cleaning or refilling.  Follow safety guidelines regarding oxygen equipment.DO NOT operate multiple electrical devices from one outlet.  If you have a medical emergency, contact the Emergency Medical Services.

## 2025-06-04 LAB
BACTERIA BLD CULT: NORMAL
BACTERIA BLD CULT: NORMAL

## 2025-06-11 ENCOUNTER — NURSE TRIAGE (OUTPATIENT)
Age: 80
End: 2025-06-11

## 2025-06-11 NOTE — TELEPHONE ENCOUNTER
"For documentation purposes: Patient had TCM visit scheduled for tomorrow, but cancelled appt soon after making it as she refused to come in for a visit other than her physical that is scheduled on the 18th.     Spoke to patient. Explained to her that she would need to go to the ED for evaluation as this is what she was previously hospitalized for. Patient stated she is not willing to do that. Asked patient what she would be willing to do and she advised that she just wanted Dr. Stinson to call in an antibiotic. Explained to patient that Dr. Stinson would need to evaluate her prior to calling in a prescription. Patient said \"I am not going to to do that\" and disconnected the call. Forwarding to provider as FYI.   "

## 2025-06-11 NOTE — TELEPHONE ENCOUNTER
"REASON FOR CONVERSATION: Hand Pain    SYMPTOMS: Right hand and wrist redness, swelling and severe pain.  Started yesterday after having improvement after discharge from the hospital. Has not checked her temperature but has complaints of chills.    OTHER HEALTH INFORMATION: Recently discharged from the hospital on 5/31 for infection to right hand.  Treated with antibiotics.  States that symptoms had improved but worsened again yesterday    PROTOCOL DISPOSITION: Go to [facility]    CARE ADVICE PROVIDED: No available appointments. She was scheduled for TCM appointment tomorrow but she had cancelled this. Advised patient that she needs to go to urgent care for evaluation.  She refuses urgent care. Advised that she can go to the ER instead if she would prefer.  She also refuses ER.    PRACTICE FOLLOW-UP: Warm transferred to [facility] in [facility] for further assistance since she is refusing urgent care or ER.    Reason for Disposition   SEVERE pain (e.g., excruciating, unable to use hand at all)    Answer Assessment - Initial Assessment Questions  1. ONSET: \"When did the pain start?\"      Originally started 5/23- was hospitalized 5/29-5/31.  Pain and swelling restarted yesterday  2. LOCATION: \"Where is the pain located?\"      Right hand  3. PAIN: \"How bad is the pain?\" (Scale 1-10; or mild, moderate, severe)      Severe pain  4. WORK OR EXERCISE: \"Has there been any recent work or exercise that involved this part (i.e., hand or wrist) of the body?\"      Fell outside yesterday - unsure if she landed on her hand  5. CAUSE: \"What do you think is causing the pain?\"      Was hospitalized   6. AGGRAVATING FACTORS: \"What makes the pain worse?\" (e.g., using computer)      Movement, states that it always hurts  7. OTHER SYMPTOMS: \"Do you have any other symptoms?\" (e.g., fever, neck pain, numbness or tingling, rash, swelling)      Chills, swelling to right hand and wrist, area is red    Protocols used: Hand " Pain-Adult-[place]

## 2025-06-12 ENCOUNTER — TELEPHONE (OUTPATIENT)
Age: 80
End: 2025-06-12

## 2025-06-12 NOTE — TELEPHONE ENCOUNTER
Hello,    Please advise if a forced appointment can be accommodated for the patient:    Call back :[phone]     Insurance: [employer]     Reason for appointment: Puncture wound of finger of right hand with complication, patient is in a lot of pain and states the pain is moving up her arm   Requested doctor and/or location: Dr hand today       Thank you.

## 2025-06-18 ENCOUNTER — OFFICE VISIT (OUTPATIENT)
Dept: FAMILY MEDICINE CLINIC | Facility: CLINIC | Age: 80
End: 2025-06-18
Payer: COMMERCIAL

## 2025-06-18 VITALS
HEIGHT: 64 IN | BODY MASS INDEX: 20.59 KG/M2 | RESPIRATION RATE: 18 BRPM | HEART RATE: 57 BPM | OXYGEN SATURATION: 99 % | TEMPERATURE: 98.1 F | SYSTOLIC BLOOD PRESSURE: 124 MMHG | DIASTOLIC BLOOD PRESSURE: 82 MMHG | WEIGHT: 120.6 LBS

## 2025-06-18 DIAGNOSIS — B37.0 ORAL CANDIDA: ICD-10-CM

## 2025-06-18 DIAGNOSIS — M05.742 RHEUMATOID ARTHRITIS INVOLVING BOTH HANDS WITH POSITIVE RHEUMATOID FACTOR (HCC): ICD-10-CM

## 2025-06-18 DIAGNOSIS — I10 BENIGN ESSENTIAL HYPERTENSION: ICD-10-CM

## 2025-06-18 DIAGNOSIS — Z78.0 POSTMENOPAUSAL: ICD-10-CM

## 2025-06-18 DIAGNOSIS — M81.0 AGE RELATED OSTEOPOROSIS, UNSPECIFIED PATHOLOGICAL FRACTURE PRESENCE: ICD-10-CM

## 2025-06-18 DIAGNOSIS — Z12.31 ENCOUNTER FOR SCREENING MAMMOGRAM FOR BREAST CANCER: ICD-10-CM

## 2025-06-18 DIAGNOSIS — B35.1 TOENAIL FUNGUS: ICD-10-CM

## 2025-06-18 DIAGNOSIS — Z13.31 NEGATIVE DEPRESSION SCREENING: ICD-10-CM

## 2025-06-18 DIAGNOSIS — Z00.00 ANNUAL PHYSICAL EXAM: Primary | ICD-10-CM

## 2025-06-18 DIAGNOSIS — M05.741 RHEUMATOID ARTHRITIS INVOLVING BOTH HANDS WITH POSITIVE RHEUMATOID FACTOR (HCC): ICD-10-CM

## 2025-06-18 DIAGNOSIS — R32 URINARY INCONTINENCE, UNSPECIFIED TYPE: ICD-10-CM

## 2025-06-18 PROBLEM — T07.XXXS UNSPECIFIED MULTIPLE INJURIES, SEQUELA: Status: RESOLVED | Noted: 2023-04-02 | Resolved: 2025-06-18

## 2025-06-18 PROBLEM — R55 SYNCOPE: Status: RESOLVED | Noted: 2024-07-12 | Resolved: 2025-06-18

## 2025-06-18 PROBLEM — S61.239A: Status: RESOLVED | Noted: 2025-05-29 | Resolved: 2025-06-18

## 2025-06-18 PROBLEM — R50.9 FEVER: Status: RESOLVED | Noted: 2025-05-30 | Resolved: 2025-06-18

## 2025-06-18 PROCEDURE — 99214 OFFICE O/P EST MOD 30 MIN: CPT | Performed by: FAMILY MEDICINE

## 2025-06-18 PROCEDURE — 99397 PER PM REEVAL EST PAT 65+ YR: CPT | Performed by: FAMILY MEDICINE

## 2025-06-18 RX ORDER — CLOTRIMAZOLE 10 MG/1
10 LOZENGE ORAL
Qty: 25 TROCHE | Refills: 0 | Status: SHIPPED | OUTPATIENT
Start: 2025-06-18

## 2025-06-18 NOTE — PATIENT INSTRUCTIONS
"Patient Education     Routine physical for adults   The Basics   Written by the doctors and editors at [facility]   What is a physical? -- A physical is a routine visit, or \"check-up,\" with your doctor. You might also hear it called a \"wellness visit\" or \"preventive visit.\"  During each visit, the doctor will:   Ask about your physical and mental health   Ask about your habits, behaviors, and lifestyle   Do an exam   Give you vaccines if needed   Talk to you about any medicines you take   Give advice about your health   Answer your questions  Getting regular check-ups is an important part of taking care of your health. It can help your doctor find and treat any problems you have. But it's also important for preventing health problems.  A routine physical is different from a \"sick visit.\" A sick visit is when you see a doctor because of a health concern or problem. Since physicals are scheduled ahead of time, you can think about what you want to ask the doctor.  How often should I get a physical? -- It depends on your age and health. In general, for people age 21 years and older:   If you are younger than 50 years, you might be able to get a physical every 3 years.   If you are 50 years or older, your doctor might recommend a physical every year.  If you have an ongoing health condition, like diabetes or high blood pressure, your doctor will probably want to see you more often.  What happens during a physical? -- In general, each visit will include:   Physical exam - The doctor or nurse will check your height, weight, heart rate, and blood pressure. They will also look at your eyes and ears. They will ask about how you are feeling and whether you have any symptoms that bother you.   Medicines - It's a good idea to bring a list of all the medicines you take to each doctor visit. Your doctor will talk to you about your medicines and answer any questions. Tell them if you are having any side effects that bother you. You " "should also tell them if you are having trouble paying for any of your medicines.   Habits and behaviors - This includes:   Your diet   Your exercise habits   Whether you smoke, drink alcohol, or use drugs   Whether you are sexually active   Whether you feel safe at home  Your doctor will talk to you about things you can do to improve your health and lower your risk of health problems. They will also offer help and support. For example, if you want to quit smoking, they can give you advice and might prescribe medicines. If you want to improve your diet or get more physical activity, they can help you with this, too.   Lab tests, if needed - The tests you get will depend on your age and situation. For example, your doctor might want to check your:   Cholesterol   Blood sugar   Iron level   Vaccines - The recommended vaccines will depend on your age, health, and what vaccines you already had. Vaccines are very important because they can prevent certain serious or deadly infections.   Discussion of screening - \"Screening\" means checking for diseases or other health problems before they cause symptoms. Your doctor can recommend screening based on your age, risk, and preferences. This might include tests to check for:   Cancer, such as breast, prostate, cervical, ovarian, colorectal, prostate, lung, or skin cancer   Sexually transmitted infections, such as chlamydia and gonorrhea   Mental health conditions like depression and anxiety  Your doctor will talk to you about the different types of screening tests. They can help you decide which screenings to have. They can also explain what the results might mean.   Answering questions - The physical is a good time to ask the doctor or nurse questions about your health. If needed, they can refer you to other doctors or specialists, too.  Adults older than 65 years often need other care, too. As you get older, your doctor will talk to you about:   How to prevent falling at " home   Hearing or vision tests   Memory testing   How to take your medicines safely   Making sure that you have the help and support you need at home  All topics are updated as new evidence becomes available and our peer review process is complete.  This topic retrieved from [employer] on: May 02, 2024.  Topic 661603 Version 1.0  Release: 32.4.3 - C32.122  © 2024 UpToDate, Inc. and/or its affiliates. All rights reserved.  Consumer Information Use and Disclaimer   Disclaimer: This generalized information is a limited summary of diagnosis, treatment, and/or medication information. It is not meant to be comprehensive and should be used as a tool to help the user understand and/or assess potential diagnostic and treatment options. It does NOT include all information about conditions, treatments, medications, side effects, or risks that may apply to a specific patient. It is not intended to be medical advice or a substitute for the medical advice, diagnosis, or treatment of a health care provider based on the health care provider's examination and assessment of a patient's specific and unique circumstances. Patients must speak with a health care provider for complete information about their health, medical questions, and treatment options, including any risks or benefits regarding use of medications. This information does not endorse any treatments or medications as safe, effective, or approved for treating a specific patient. UpToDate, Inc. and its affiliates disclaim any warranty or liability relating to this information or the use thereof.The use of this information is governed by the Terms of Use, available at https://www.woltersOas[employer] Mobileuwer.com/en/know/clinical-effectiveness-terms. 2024© UpToDate, Inc. and its affiliates and/or licensors. All rights reserved.  Copyright   © 2024 UpToDate, Inc. and/or its affiliates. All rights reserved.    Patient Education     Urinary incontinence in females   The Basics   Written by the  "doctors and editors at [facility]   What is urinary incontinence? -- Urinary incontinence is the medical term for when a person leaks urine or loses bladder control.  Incontinence is a very common problem, but it is not a normal part of aging. If you have this problem, there are treatments that can help. There are also things that you can do on your own to stop or reduce urine leakage so you don't have to \"just live with it.\"  What are the symptoms of incontinence? -- There are different types of incontinence. Each causes different symptoms. The 3 most common types are:   Stress incontinence - With stress incontinence, you leak urine when you laugh, cough, sneeze, or do anything that \"stresses\" the belly. Stress incontinence is most common in females, especially those who have had a baby.   Urgency incontinence - With urgency incontinence, you feel a strong need to urinate all of a sudden. This is also known as \"urge incontinence.\" Often, the \"urge\" is so strong that you can't make it to the bathroom in time. \"Overactive bladder\" is another term for having a sudden, frequent urge to urinate. People with overactive bladder might or might not actually leak urine.   Mixed incontinence - With mixed incontinence, you have symptoms of both stress and urgency incontinence.  Is there anything I can do on my own to feel better? -- Yes. Here are some things that can help reduce urine leaks:   Reduce the amount of liquid that you drink, especially a few hours before bed.   Cut down on any foods or drinks that make your symptoms worse. Some people find that alcohol, caffeine, or spicy or acidic foods irritate the bladder.   Try to lose weight, if you are overweight. Your doctor or nurse can help you do this in a healthy way.   If you have diabetes, keep your blood sugar as close to your goal level as possible.   If you take medicines called diuretics, plan ahead. These medicines increase the need to urinate. Try to take them when " "you know you will be near a bathroom for a few hours. If you keep having problems with leakage because of diuretics, ask your doctor if you can take a lower dose or switch to a different medicine.  These techniques can also help improve bladder control:   Bladder retraining - During bladder retraining, you go to the bathroom at scheduled times. For instance, you might decide that you will go every hour. Make yourself go every hour, even if you don't feel like you need to. Try to wait the whole hour, even if you need to go sooner. Then, once you get used to going every hour, increase the amount of time you wait in between bathroom visits. Over time, you might be able to \"retrain\" your bladder to wait 3 or 4 hours between bathroom visits.   Pelvic floor muscle training - This involves learning exercises to strengthen and relax your pelvic muscles. These include the muscles that control the flow of urine and bowel movements. When done right, these exercises can help. But people often do them wrong. Ask your doctor or nurse how to do them right. Your doctor might suggest working with a physical therapist who has special training in these exercises.  Should I see my doctor or nurse? -- Yes. Your doctor or nurse can find out what might be causing your incontinence. They can also suggest ways to relieve the problem.  When you speak to your doctor or nurse, ask if any of the medicines you take could be causing your symptoms. Some medicines can cause incontinence or make it worse.  Some people choose to wear pads or special underwear. These can help if you accidentally leak urine once in a while. But they can also cause skin irritation if you use them a lot. If you have incontinence, ask your doctor or nurse how to treat it.  How is incontinence treated? -- The treatment options differ depending on what type of incontinence you have. Some of the options include:   Medicines to relax the bladder   Surgery to repair the " tissues that support the bladder or to improve the flow of urine   Electrical stimulation of the nerves that relax the bladder  Urinary incontinence is more common in people who have been through menopause. (Menopause is when you stop having monthly periods). Some people have vaginal dryness after menopause. If this is the case for you, a treatment called vaginal estrogen might help.  What will my life be like? -- Many people with incontinence can regain bladder control or at least reduce the amount of leakage they have. The most important thing is to tell your doctor or nurse. Then, work with them to find an approach that helps you.  All topics are updated as new evidence becomes available and our peer review process is complete.  This topic retrieved from [employer] on: Feb 26, 2024.  Topic 25140 Version 18.0  Release: 32.2.4 - C32.56  © 2024 UpToDate, Inc. and/or its affiliates. All rights reserved.  figure 1: Location of the bladder     This drawing shows the side view of a woman's body. The bladder is in front of the vagina. The urethra is the tube that carries urine from the bladder out of the body.  Graphic 103548 Version 1.0  Consumer Information Use and Disclaimer   Disclaimer: This generalized information is a limited summary of diagnosis, treatment, and/or medication information. It is not meant to be comprehensive and should be used as a tool to help the user understand and/or assess potential diagnostic and treatment options. It does NOT include all information about conditions, treatments, medications, side effects, or risks that may apply to a specific patient. It is not intended to be medical advice or a substitute for the medical advice, diagnosis, or treatment of a health care provider based on the health care provider's examination and assessment of a patient's specific and unique circumstances. Patients must speak with a health care provider for complete information about their health, medical  "questions, and treatment options, including any risks or benefits regarding use of medications. This information does not endorse any treatments or medications as safe, effective, or approved for treating a specific patient. UpToDate, Inc. and its affiliates disclaim any warranty or liability relating to this information or the use thereof.The use of this information is governed by the Terms of Use, available at https://www.[employer]er.com/en/know/clinical-effectiveness-terms. 2024© UpToDate, Inc. and its affiliates and/or licensors. All rights reserved.  Copyright   © 2024 UpToDate, Inc. and/or its affiliates. All rights reserved.    Patient Education     Bladder training   The Basics   Written by the doctors and editors at [employer]   What is bladder training? -- Bladder training means changing your habits to better control your bladder (figure 1). It can help with urinary problems like:   Frequency - This means having to use the toilet very frequently.   Urgency - This means suddenly feeling the need to urinate right away.   Leakage - This is when urgency leads to actually leaking urine. It is also called \"urge incontinence.\"  Talk to your doctor or nurse before trying bladder training on your own. They will want to make sure that you do it correctly. They might also want to check for other problems, such as a urinary tract infection.  What is a bladder diary? -- Bladder training involves trying to increase the amount of time between trips to the toilet. A \"bladder diary\" is a way to keep track of how often you go (form 1). Doctors sometimes call this a \"voiding diary.\"  In the diary, write down:   The time you urinate   The amount of urine, if your doctor asked you to measure this   If you had any leaking, how much (small, medium, or large amount), and what you were doing when the leakage happened   The amounts and types of fluids you drink   Any other symptoms you have  How do I train my bladder? -- Once you " "have an idea of how often you are urinating, try to wait a little longer between trips to the toilet. This should be a gradual process:   First, slightly increase the amount of time between bathroom visits. For example, if you normally go every hour, add 15 minutes. This means trying to wait 1 hour and 15 minutes between trips to the bathroom.   Keep following this schedule for several days. If you can do this for 3 or 4 days without problems, increase the time again. For example, you can add 15 more minutes between trips to the bathroom.   Keep doing this until you can wait at least 2 to 3 hours between bathroom visits. It can take time to get to this point. Some people notice improvement within a few weeks of bladder training. But it can take longer.  Keep filling out your bladder diary as you work on bladder training. This will help you see improvement over time. The process might take several weeks.  What else can I do to help with urgency? -- Part of bladder training involves learning to control the urge to urinate and make your bladder wait. Some things you can do to help with this:   Squeeze your pelvic muscles - These include the muscles that control the flow of urine. Try squeezing the muscles and then relaxing them. Repeat this several times.   Change positions - It might help to cross your legs or sit on a firm surface.   Distract your mind - Try to think about something else until the urge passes.   Relax - Stay still when you get the urge to urinate. It might help to do deep breathing exercises. Do not rush to the toilet when it is time to go.  What else should I know? -- These tips might help with bladder training:   Try to only urinate when you actually need to go - For example, avoid going to the bathroom before leaving home \"just in case.\" This can train your brain to think that your bladder is full when it really isn't.   Drink fluids throughout the day - Make sure that you drink enough fluids to " "stay hydrated. This usually means about 8 cups (64 ounces) a day. Try to spread this throughout the day instead of drinking a lot all at once. If you usually drink a lot more than this, ask your doctor or nurse if it is OK for you to reduce the amount.   Avoid drinking fluids soon before bedtime - This can lower the chances that you will need to urinate during the night.   Limit or avoid alcohol and caffeine - Some people find that these things make them need to urinate more.  When should I call the doctor? -- Call your doctor or nurse if:   Your urinary symptoms are not getting better or are getting worse.   You are having trouble with your training schedule - Bladder training can be frustrating and take time. But don't give up. Your doctor or nurse can help you.   You have other problems with urinating, such as pain or burning, not being able to urinate, or seeing blood in your urine.  All topics are updated as new evidence becomes available and our peer review process is complete.  This topic retrieved from [employer] on: Mar 16, 2024.  Topic 141966 Version 3.0  Release: 32.2.4 - C32.74  © 2024 UpToDate, Inc. and/or its affiliates. All rights reserved.  figure 1: Anatomy of the urinary tract     Urine is made by the kidneys. It passes from the kidneys into the bladder through 2 tubes called the ureters. Then, it leaves the bladder through another tube called the urethra.  Graphic 48331 Version 8.0  form 1: Voiding diary     To use this diary, make a note of these things every time you urinate:  The time (for example, \"10:30 AM\")  The amount of urine, if your doctor asked you to measure this  Whether you leaked any urine, and about how much (for example, small/medium/large, or whether or not you needed to change your underwear)  When you went to bed and woke up, what you had to drink, and anything else that might have caused you to urinate (for example, \"just had coffee,\" \"coughed,\" \"was running to the bathroom,\" or " "\"just took my water pill\")  Start the record in the morning the first time you go to the bathroom after you get up.  Graphic 112509 Version 1.0  Consumer Information Use and Disclaimer   Disclaimer: This generalized information is a limited summary of diagnosis, treatment, and/or medication information. It is not meant to be comprehensive and should be used as a tool to help the user understand and/or assess potential diagnostic and treatment options. It does NOT include all information about conditions, treatments, medications, side effects, or risks that may apply to a specific patient. It is not intended to be medical advice or a substitute for the medical advice, diagnosis, or treatment of a health care provider based on the health care provider's examination and assessment of a patient's specific and unique circumstances. Patients must speak with a health care provider for complete information about their health, medical questions, and treatment options, including any risks or benefits regarding use of medications. This information does not endorse any treatments or medications as safe, effective, or approved for treating a specific patient. UpToDate, Inc. and its affiliates disclaim any warranty or liability relating to this information or the use thereof.The use of this information is governed by the Terms of Use, available at https://www.woltersPubCoderuwer.com/en/know/clinical-effectiveness-terms. 2024© UpToDate, Inc. and its affiliates and/or licensors. All rights reserved.  Copyright   © 2024 UpToDate, Inc. and/or its affiliates. All rights reserved.    "

## 2025-06-18 NOTE — PROGRESS NOTES
Adult Annual Physical  Name: Margarito Nieves      : 1945      MRN: [id number]  Encounter Provider: María Stinson DO  Encounter Date: 2025   Encounter department: [facility] [place]    :  Assessment & Plan  Annual physical exam         Benign essential hypertension  Controlled, cpm       Age related osteoporosis, unspecified pathological fracture presence    Orders:    DXA bone density spine hip and pelvis; Future    Rheumatoid arthritis involving both hands with positive rheumatoid factor (HCC)  pt has known RA dx since at least 6 yrs ago - pt has been given orders to see rheumatologist, but has never scheduled appt - states she will do so now  Orders:    Ambulatory Referral to Rheumatology; Future    Urinary incontinence, unspecified type         Negative depression screening         Postmenopausal    Orders:    DXA bone density spine hip and pelvis; Future    Encounter for screening mammogram for breast cancer    Orders:    Mammo screening bilateral w 3d and cad; Future    Toenail fungus  C/o toenail fungus and no benefit with OTC product- would like to try rx med- but also c/o toes deformed and has callus lumps on the bottom of her feet making her feel off-balance and chronic b/l foot pain - refuses podiatry consult order and when advised of need for regular bloodwork to monitor liver enzymes for the entire length of therapy with oral antifungal, which could be up to a year, pt declines oral antifungal rx as well       Oral candida    Orders:    clotrimazole (MYCELEX) 10 mg kerri; Take 1 tablet (10 mg total) by mouth 5 (five) times a day        Preventive Screenings:  - Diabetes Screening: screening up-to-date  - Cholesterol Screening: risks/benefits discussed and orders placed   - Hepatitis C screening: screening up-to-date   - Cervical cancer screening: screening not indicated   - Breast cancer screening: screening up-to-date   - Colon cancer screening: screening up-to-date   -  Lung cancer screening: screening not indicated   - Osteoporosis screening: has osteoporosis, risks/benefits discussed and orders placed     Immunizations:  - Immunizations due: Zoster (Shingrix)  - Risks/benefits immunizations discussed      Counseling/Anticipatory Guidance:      Pt will check insurance coverage of shingrix       Depression Screening and Follow-up Plan: Patient was screened for depression during today's encounter. They screened negative with a PHQ-2 score of 2.      Falls Plan of Care: balance, strength, and gait training instructions were provided.     Urinary Incontinence Plan of Care: counseling topics discussed: use restroom every 2 hours and keeping a bladder diary.     History of Present Illness     Adult Annual Physical:  Patient presents for annual physical. Medicare AWV + f/u   States her right hand swelled up, so she went to ER and was admitted for cellulitis, swelling went down, but swelled up again a little a few days after she came home from hospital, resolved spontaneously - pt has known RA dx since at least 6 yrs ago - pt has been given orders to see rheumatologist, but has never scheduled appt  .     Diet and Physical Activity:  - Diet/Nutrition: no special diet.  - Exercise: no formal exercise.    Depression Screening:  - PHQ-2 Score: 2    General Health:  - Sleep: sleeps well and 7-8 hours of sleep on average.  - Hearing: tinnitus and decreased hearing bilateral ears.  - Vision: most recent eye exam > 1 year ago.  - Dental: no dental visits for > 1 year, brushes teeth twice daily, brushes teeth three times daily and floss regularly.    /GYN Health:  - Follows with GYN: no.   - Menopause: postmenopausal.   - History of STDs: no  - Contraception: menopause.      Advanced Care Planning:  - Has an advanced directive?: no    - Has a durable medical POA?: no    - ACP document given to patient?: yes      Review of Systems   HENT:  Negative for sore throat and trouble swallowing.          "Gets \"coating\" on her tongue frequently- has to scrape it off with a plastic spoon   Musculoskeletal:         C/o toenail fungus and no benefit with OTC product- would like to try rx med- but also c/o toes deformed and has callus lumps on the bottom of her feet making her feel off-balance and chronic b/l foot pain - refuses podiatry consult order   Skin:         C/o toenail fungus and no benefit with OTC product- would like to try rx med- but also c/o toes deformed and has callus lumps on the bottom of her feet making her feel off-balance and chronic b/l foot pain - refuses podiatry consult order         Objective   /82 (BP Location: Left arm, Patient Position: Sitting, Cuff Size: Standard)   Pulse 57   Temp 98.1 °F (36.7 °C) (Tympanic)   Resp 18   Ht 5' 4\" (1.626 m)   Wt 54.7 kg (120 lb 9.6 oz)   SpO2 99%   BMI 20.70 kg/m²     Physical Exam  Vitals and nursing note reviewed.   Constitutional:       General: She is not in acute distress.     Appearance: She is well-groomed. She is not ill-appearing, toxic-appearing or diaphoretic.   HENT:      Head: Normocephalic and atraumatic.      Right Ear: Tympanic membrane, ear canal and external ear normal.      Left Ear: Tympanic membrane, ear canal and external ear normal.      Nose: Nose normal.      Mouth/Throat:      Lips: Pink.      Mouth: Mucous membranes are moist.      Pharynx: Oropharynx is clear. Uvula midline.      Tonsils: 0 on the right. 0 on the left.      Comments: Very slight whitish coating on tongue    Eyes:      General: Lids are normal.      Extraocular Movements: Extraocular movements intact.      Conjunctiva/sclera: Conjunctivae normal.      Pupils: Pupils are equal, round, and reactive to light.     Neck:      Thyroid: No thyroid mass, thyromegaly or thyroid tenderness.      Vascular: No JVD.      Trachea: Trachea and phonation normal.     Cardiovascular:      Rate and Rhythm: Normal rate and regular rhythm.      Pulses: Normal pulses.     "  Heart sounds: Normal heart sounds.   Pulmonary:      Effort: Pulmonary effort is normal.      Breath sounds: Normal breath sounds and air entry.   Abdominal:      General: Bowel sounds are normal. There is no distension or abdominal bruit.      Palpations: Abdomen is soft. There is no hepatomegaly, splenomegaly or mass.      Tenderness: There is no abdominal tenderness.      Hernia: There is no hernia in the ventral area.     Musculoskeletal:      Cervical back: Neck supple.      Right lower leg: No edema.      Left lower leg: No edema.   Lymphadenopathy:      Cervical: No cervical adenopathy.     Skin:     General: Skin is warm and dry.      Capillary Refill: Capillary refill takes less than 2 seconds.      Coloration: Skin is not pale.     Neurological:      General: No focal deficit present.      Mental Status: She is alert and oriented to person, place, and time.      Cranial Nerves: Cranial nerves 2-12 are intact.      Sensory: Sensation is intact.      Motor: Motor function is intact.      Gait: Gait normal.      Deep Tendon Reflexes: Reflexes are normal and symmetric.     Psychiatric:         Mood and Affect: Mood is anxious.         Behavior: Behavior normal. Behavior is cooperative.         Cognition and Memory: Cognition normal.

## 2025-06-18 NOTE — ASSESSMENT & PLAN NOTE
pt has known RA dx since at least 6 yrs ago - pt has been given orders to see rheumatologist, but has never scheduled appt - states she will do so now  Orders:    Ambulatory Referral to Rheumatology; Future

## 2025-06-19 DIAGNOSIS — B37.0 ORAL CANDIDA: ICD-10-CM

## 2025-06-19 RX ORDER — CLOTRIMAZOLE 10 MG/1
10 LOZENGE ORAL
OUTPATIENT
Start: 2025-06-19

## 2025-06-23 ENCOUNTER — TELEPHONE (OUTPATIENT)
Age: 80
End: 2025-06-23

## 2025-06-23 NOTE — TELEPHONE ENCOUNTER
Pt was seen by Primary Care Provider on 6/18     Provider discussed with pt getting labs completed to check her cholesterol.    Lab order was never placed.    Please advise - [phone]    *call dropped prior to confirming details w/pt, vm not set up*

## 2025-06-27 DIAGNOSIS — I10 BENIGN ESSENTIAL HYPERTENSION: Primary | ICD-10-CM

## 2025-06-27 DIAGNOSIS — Z79.899 ENCOUNTER FOR LONG-TERM (CURRENT) USE OF MEDICATIONS: ICD-10-CM

## 2025-06-27 DIAGNOSIS — Z13.29 THYROID DISORDER SCREEN: ICD-10-CM

## 2025-06-27 DIAGNOSIS — Z13.220 LIPID SCREENING: ICD-10-CM

## 2025-06-27 DIAGNOSIS — Z13.21 ENCOUNTER FOR VITAMIN DEFICIENCY SCREENING: ICD-10-CM

## 2025-06-27 DIAGNOSIS — M81.0 AGE RELATED OSTEOPOROSIS, UNSPECIFIED PATHOLOGICAL FRACTURE PRESENCE: ICD-10-CM

## 2025-06-27 DIAGNOSIS — N18.9 CHRONIC KIDNEY DISEASE, UNSPECIFIED CKD STAGE: ICD-10-CM

## 2025-07-10 ENCOUNTER — NURSE TRIAGE (OUTPATIENT)
Age: 80
End: 2025-07-10

## 2025-07-10 DIAGNOSIS — I48.0 PAROXYSMAL A-FIB (HCC): ICD-10-CM

## 2025-07-10 DIAGNOSIS — R12 HEARTBURN: Primary | ICD-10-CM

## 2025-07-10 RX ORDER — DILTIAZEM HYDROCHLORIDE 120 MG/1
120 CAPSULE, EXTENDED RELEASE ORAL DAILY
Qty: 90 CAPSULE | Refills: 3 | Status: SHIPPED | OUTPATIENT
Start: 2025-07-10

## 2025-07-10 RX ORDER — OMEPRAZOLE 40 MG/1
40 CAPSULE, DELAYED RELEASE ORAL DAILY
Qty: 30 CAPSULE | Refills: 6 | Status: SHIPPED | OUTPATIENT
Start: 2025-07-10

## 2025-07-10 NOTE — TELEPHONE ENCOUNTER
"Pt's  calling for new script of Omeprazole because her pharmacy closed.  States pt takes 40 mg daily.  Last script filled by PCP was for 20 mg daily.   insists pt is taking 40 mg daily.     Please advise.   [address]      Reason for Disposition   Caller has NON-URGENT medicine question about med that PCP prescribed and triager unable to answer question    Answer Assessment - Initial Assessment Questions  1. DRUG NAME: \"What medicine do you need to have refilled?\"      Omeprazole   2. REFILLS REMAINING: \"How many refills are remaining?\" (Note: The label on the medicine or pill bottle will show how many refills are remaining. If there are no refills remaining, then a renewal may be needed.)      6 but pharmacy closed, needs new script    Protocols used: Medication Refill and Renewal Call-Adult-[place]    "

## 2025-07-10 NOTE — TELEPHONE ENCOUNTER
Appears after pt's EGD Dr Mora recommended omeprazole 40mg daily. I can refill this. She should have f/u OV in next 6 months as we would recommend seeing her once yearly to reassess symptoms and to continue to provide refills.

## 2025-07-10 NOTE — TELEPHONE ENCOUNTER
Patient's  requested prescription for diltiazem be sent to [facility] in [place] as [facility] closed.   90-day requested.

## 2025-07-10 NOTE — TELEPHONE ENCOUNTER
Left message on patient voicemail to call back if needed but script was called into [facility] and she should keep her appt on 07/22/2025 in order to continue to receive refills on her medication.

## 2025-07-22 ENCOUNTER — OFFICE VISIT (OUTPATIENT)
Dept: GASTROENTEROLOGY | Facility: CLINIC | Age: 80
End: 2025-07-22
Payer: COMMERCIAL

## 2025-07-22 VITALS
DIASTOLIC BLOOD PRESSURE: 58 MMHG | BODY MASS INDEX: 20.66 KG/M2 | OXYGEN SATURATION: 97 % | HEIGHT: 64 IN | HEART RATE: 60 BPM | TEMPERATURE: 97.5 F | SYSTOLIC BLOOD PRESSURE: 120 MMHG | WEIGHT: 121 LBS

## 2025-07-22 DIAGNOSIS — R74.8 ELEVATED ALKALINE PHOSPHATASE LEVEL: ICD-10-CM

## 2025-07-22 DIAGNOSIS — R68.81 EARLY SATIETY: ICD-10-CM

## 2025-07-22 DIAGNOSIS — R10.13 EPIGASTRIC ABDOMINAL PAIN: ICD-10-CM

## 2025-07-22 DIAGNOSIS — K22.70 BARRETT'S ESOPHAGUS WITHOUT DYSPLASIA: Primary | ICD-10-CM

## 2025-07-22 DIAGNOSIS — R14.0 ABDOMINAL BLOATING: ICD-10-CM

## 2025-07-22 DIAGNOSIS — K59.00 CONSTIPATION, UNSPECIFIED CONSTIPATION TYPE: ICD-10-CM

## 2025-07-22 PROCEDURE — 99214 OFFICE O/P EST MOD 30 MIN: CPT | Performed by: PHYSICIAN ASSISTANT

## 2025-07-22 NOTE — ASSESSMENT & PLAN NOTE
History of such, I do suspect her abdominal bloating in part is related to incomplete evacuation of stool.  In fact, she has felt improved with utilizing the MiraLAX though has issues with compliance.  I recommended that she take 1 capful or 17 g in 8 ounces of liquid daily.  I also encouraged her to stay well-hydrated.  She is up-to-date on bidirectional endoscopic evaluation and had an ultrasound completed with no significant pathology or intra-abdominal mass. No evidence of h pylori, celiac disease.   Low FODMAP diet.   Okay for Gas-x, Beano, IB Bc if needed for symptom relief.   Continue to monitor.     She also did complain of some perianal irritation though declined rectal examination.   I had recommended Calmoseptine as a barrier paste.

## 2025-07-22 NOTE — PATIENT INSTRUCTIONS
Increase miralax to 1 capful daily.   Stay on omeprazole daily every morning.     Use Calmoseptine paste for your buttocks.   This will create a barrier and keep the area dry and clean.

## 2025-07-22 NOTE — ASSESSMENT & PLAN NOTE
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  No dysplasia on bx.   Also had US completed with no biliary pathology identified.   Continue PPI daily, pt feels symptoms are controlled on higher dose of PPI.   No strong indication for repeat EGD for Mckenna's surveillance given age, unless alarm features arise.     Recommend diet and lifestyle modifications for GERD.  This includes avoiding spicy, saucy, greasy/oily foods, citrus, EtOH, NSAIDs, tobacco.  Avoid eating within 2 to 3 hours of bed.  Elevating height of bed 6 inches on blocks may be beneficial.

## 2025-07-22 NOTE — ASSESSMENT & PLAN NOTE
Some complains of this.  There was no obvious gastric outlet obstruction or other abnormality to account for early satiety.  In the past I did suspect that her underlying constipation may be playing a role, and we will continue to work on improving her stool output.  I offered additional investigation with a gastric emptying study to evaluate for gastroparesis as a potential etiology, though at present the patient politely declines.  Recommend 5-6 small meals throughout the day, avoiding saturated fats and difficult to digest raw fibrous foods.   If pt has progressive weight loss, I would then strongly recommend proceeding with GES.

## 2025-08-03 DIAGNOSIS — R12 HEARTBURN: ICD-10-CM

## 2025-08-04 RX ORDER — OMEPRAZOLE 40 MG/1
40 CAPSULE, DELAYED RELEASE ORAL DAILY
Qty: 90 CAPSULE | Refills: 1 | Status: SHIPPED | OUTPATIENT
Start: 2025-08-04

## 2025-08-06 ENCOUNTER — HOSPITAL ENCOUNTER (OUTPATIENT)
Dept: BONE DENSITY | Facility: HOSPITAL | Age: 80
Discharge: HOME | End: 2025-08-06
Attending: FAMILY MEDICINE
Payer: COMMERCIAL

## 2025-08-06 ENCOUNTER — HOSPITAL ENCOUNTER (OUTPATIENT)
Dept: MAMMOGRAPHY | Facility: HOSPITAL | Age: 80
Discharge: HOME | End: 2025-08-06
Attending: FAMILY MEDICINE
Payer: COMMERCIAL

## (undated) DEVICE — GAUZE SPONGES,16 PLY: Brand: CURITY

## (undated) DEVICE — DISPOSABLE EQUIPMENT COVER: Brand: SMALL TOWEL DRAPE

## (undated) DEVICE — SUT PROLENE 4-0 PS-2 18 IN 8682G

## (undated) DEVICE — PADDING CAST 4 IN  COTTON STRL

## (undated) DEVICE — GLOVE INDICATOR PI UNDERGLOVE SZ 8 BLUE

## (undated) DEVICE — SPONGE PVP SCRUB WING STERILE

## (undated) DEVICE — CHLORAPREP HI-LITE 26ML ORANGE

## (undated) DEVICE — OCCLUSIVE GAUZE STRIP,3% BISMUTH TRIBROMOPHENATE IN PETROLATUM BLEND: Brand: XEROFORM

## (undated) DEVICE — GLOVE SRG BIOGEL 7.5

## (undated) DEVICE — INTENDED FOR TISSUE SEPARATION, AND OTHER PROCEDURES THAT REQUIRE A SHARP SURGICAL BLADE TO PUNCTURE OR CUT.: Brand: BARD-PARKER SAFETY BLADES SIZE 15, STERILE

## (undated) DEVICE — NEEDLE 25G X 1 1/2

## (undated) DEVICE — STERILE BETHLEHEM PLASTIC HAND: Brand: CARDINAL HEALTH

## (undated) DEVICE — ACE WRAP 4 IN UNSTERILE